# Patient Record
Sex: FEMALE | Race: WHITE | Employment: OTHER | URBAN - METROPOLITAN AREA
[De-identification: names, ages, dates, MRNs, and addresses within clinical notes are randomized per-mention and may not be internally consistent; named-entity substitution may affect disease eponyms.]

---

## 2017-04-04 ENCOUNTER — TELEPHONE (OUTPATIENT)
Dept: FAMILY MEDICINE CLINIC | Age: 72
End: 2017-04-04

## 2017-04-04 NOTE — TELEPHONE ENCOUNTER
Patient takes allegra 24 hour tabs daily. Patient wants to know if she can add another allergy medication.

## 2017-08-28 ENCOUNTER — OFFICE VISIT (OUTPATIENT)
Dept: FAMILY MEDICINE CLINIC | Age: 72
End: 2017-08-28

## 2017-08-28 VITALS
SYSTOLIC BLOOD PRESSURE: 180 MMHG | OXYGEN SATURATION: 96 % | BODY MASS INDEX: 22.84 KG/M2 | HEIGHT: 61 IN | DIASTOLIC BLOOD PRESSURE: 78 MMHG | RESPIRATION RATE: 12 BRPM | WEIGHT: 121 LBS | HEART RATE: 63 BPM | TEMPERATURE: 98 F

## 2017-08-28 DIAGNOSIS — R21 RASH: ICD-10-CM

## 2017-08-28 DIAGNOSIS — B02.9 HERPES ZOSTER WITHOUT COMPLICATION: Primary | ICD-10-CM

## 2017-08-28 RX ORDER — TRIAMCINOLONE ACETONIDE 1 MG/G
CREAM TOPICAL 2 TIMES DAILY
Qty: 15 G | Refills: 0 | Status: SHIPPED | OUTPATIENT
Start: 2017-08-28 | End: 2018-06-14

## 2017-08-28 NOTE — PROGRESS NOTES
HPI  Bubba Dodge is a 81 y.o. female who presents with a rash. Noticed that one week ago under the left armpit coming around to the left breast.  It is 2 distinct areas of rash about the size of a $0.50 piece. Describes it as a soreness rather than skin irritation, itching, burning. Has not tried anything for this. She is concerned about shingles    PMHx:  Past Medical History:   Diagnosis Date    GERD (gastroesophageal reflux disease)     High cholesterol     HTN (hypertension)     Hypothyroid        Meds:   Current Outpatient Prescriptions   Medication Sig Dispense Refill    triamcinolone acetonide (KENALOG) 0.1 % topical cream Apply  to affected area two (2) times a day. use thin layer 15 g 0    levothyroxine (SYNTHROID) 137 mcg tablet Take 137 mcg by mouth Daily (before breakfast). 90 Tab 3    lutein 20 mg tab Take  by mouth.  atorvastatin (LIPITOR) 40 mg tablet Take 1 Tab by mouth nightly. For cholesterol 90 Tab 3    atenolol (TENORMIN) 25 mg tablet Take 2 Tabs by mouth daily. 180 Tab 3    omeprazole (PRILOSEC) 20 mg capsule Take 1 Cap by mouth daily. 90 Cap 3    fluticasone (FLONASE) 50 mcg/actuation nasal spray 1 spray each nostril twice a day 1 Bottle 5    CALCIUM CARBONATE (CALCIUM 600 PO) Take  by mouth.  aspirin 81 mg chewable tablet Take 81 mg by mouth daily.  cholecalciferol, vitamin d3, 400 unit cap Take  by mouth.  multivitamin (ONE A DAY) tablet Take 1 Tab by mouth daily.  fexofenadine (ALLEGRA) 180 mg tablet Take  by mouth daily. Allergies: Allergies   Allergen Reactions    Pneumococcal 23-Roxanne Ps Vaccine Swelling       Smoker:  History   Smoking Status    Never Smoker   Smokeless Tobacco    Never Used       ETOH:   History   Alcohol Use No       FH: No family history on file. ROS:   As listed in HPI.  In addition:  Constitutional:   No headache, fever, fatigue, weight loss or weight gain      Cardiac:    No chest pain      Resp:   No cough or shortness of breath      Neuro   No loss of consciousness, dizziness, seizures      Physical Exam:  Blood pressure 180/78, pulse 63, temperature 98 °F (36.7 °C), resp. rate 12, height 5' 1\" (1.549 m), weight 121 lb (54.9 kg), last menstrual period 01/06/1996, SpO2 96 %. GEN: No apparent distress. Alert and oriented and responds to all questions appropriately. NEUROLOGIC:  No focal neurologic deficits. Strength and sensation grossly intact. Coordination and gait grossly intact. EXT: Well perfused. No edema. SKIN: Left axilla, left lateral breast examined, there are 2 distinct areas about the same size. About 1 inch in diameter where the skin is dry, raw with punctate erythema. No obvious blisters with roofs. Possible tiny ulcers but hard to tell       Assessment and Plan     Zoster? Distribution and soreness consistent with a mild zoster flare. Difficult to tell because symptoms are so mild. We will treat as though a light eczema for lack of anything more productive to do. At this point antiviral would not be effective against zoster and patient is having such mild symptoms that she does not feel the need for medication anyway. Steroid cream  Apply moisturizing lotion over top    Elevated BP  Blood pressure elevated, did not come down on recheck. Not typical for her recently  Monitor      ICD-10-CM ICD-9-CM    1. Herpes zoster without complication V67.6 209.3    2. Rash R21 782.1 triamcinolone acetonide (KENALOG) 0.1 % topical cream       AVS given.  Pt expressed understanding of instructions

## 2017-09-28 RX ORDER — OMEPRAZOLE 20 MG/1
20 CAPSULE, DELAYED RELEASE ORAL DAILY
Qty: 90 CAP | Refills: 3 | Status: SHIPPED | OUTPATIENT
Start: 2017-09-28 | End: 2018-08-15 | Stop reason: SDUPTHER

## 2017-09-28 NOTE — TELEPHONE ENCOUNTER
Patient calling in 90 day supply for med. She also needs to be set up for an allergist and would like to talk to a nurse about how to go about doing this.  She can be reached at [phone]

## 2017-10-27 ENCOUNTER — HOSPITAL ENCOUNTER (OUTPATIENT)
Dept: LAB | Age: 72
Discharge: HOME | End: 2017-10-27
Payer: MEDICARE

## 2017-10-27 ENCOUNTER — OFFICE VISIT (OUTPATIENT)
Dept: FAMILY MEDICINE CLINIC | Age: 72
End: 2017-10-27

## 2017-10-27 VITALS
RESPIRATION RATE: 12 BRPM | OXYGEN SATURATION: 98 % | WEIGHT: 120 LBS | HEIGHT: 61 IN | SYSTOLIC BLOOD PRESSURE: 132 MMHG | BODY MASS INDEX: 22.66 KG/M2 | DIASTOLIC BLOOD PRESSURE: 82 MMHG | TEMPERATURE: 97.8 F | HEART RATE: 62 BPM

## 2017-10-27 DIAGNOSIS — Z23 ENCOUNTER FOR IMMUNIZATION: ICD-10-CM

## 2017-10-27 DIAGNOSIS — I10 ESSENTIAL HYPERTENSION: ICD-10-CM

## 2017-10-27 DIAGNOSIS — Z00.00 MEDICARE ANNUAL WELLNESS VISIT, SUBSEQUENT: Primary | ICD-10-CM

## 2017-10-27 DIAGNOSIS — Z13.39 SCREENING FOR ALCOHOLISM: ICD-10-CM

## 2017-10-27 DIAGNOSIS — M81.0 POSTMENOPAUSAL BONE LOSS: ICD-10-CM

## 2017-10-27 DIAGNOSIS — E03.9 ACQUIRED HYPOTHYROIDISM: ICD-10-CM

## 2017-10-27 DIAGNOSIS — Z13.31 SCREENING FOR DEPRESSION: ICD-10-CM

## 2017-10-27 DIAGNOSIS — E78.5 HYPERLIPIDEMIA, UNSPECIFIED HYPERLIPIDEMIA TYPE: ICD-10-CM

## 2017-10-27 PROCEDURE — 80053 COMPREHEN METABOLIC PANEL: CPT

## 2017-10-27 PROCEDURE — 80061 LIPID PANEL: CPT

## 2017-10-27 PROCEDURE — 36415 COLL VENOUS BLD VENIPUNCTURE: CPT

## 2017-10-27 PROCEDURE — 84443 ASSAY THYROID STIM HORMONE: CPT

## 2017-10-27 PROCEDURE — 85025 COMPLETE CBC W/AUTO DIFF WBC: CPT

## 2017-10-27 NOTE — PROGRESS NOTES
Chief Complaint   Patient presents with   [facility] Annual Wellness Visit     Patient is here for a medicare wellness visit.

## 2017-10-27 NOTE — PROGRESS NOTES
This is a Subsequent Medicare Annual Wellness Exam (AWV) (Performed 12 months after IPPE or effective date of Medicare Part B enrollment)    I have reviewed the patient's medical history in detail and updated the computerized patient record. History     Past Medical History:   Diagnosis Date    GERD (gastroesophageal reflux disease)     High cholesterol     HTN (hypertension)     Hypothyroid       Past Surgical History:   Procedure Laterality Date    HX GYN      hysterectomy     Current Outpatient Prescriptions   Medication Sig Dispense Refill    varicella zoster vacine live (ZOSTAVAX) 19,400 unit/0.65 mL susr injection 1 Vial by SubCUTAneous route once for 1 dose. 0.65 mL 0    omeprazole (PRILOSEC) 20 mg capsule Take 1 Cap by mouth daily. 90 Cap 3    triamcinolone acetonide (KENALOG) 0.1 % topical cream Apply  to affected area two (2) times a day. use thin layer 15 g 0    levothyroxine (SYNTHROID) 137 mcg tablet Take 137 mcg by mouth Daily (before breakfast). 90 Tab 3    lutein 20 mg tab Take  by mouth.  atorvastatin (LIPITOR) 40 mg tablet Take 1 Tab by mouth nightly. For cholesterol 90 Tab 3    atenolol (TENORMIN) 25 mg tablet Take 2 Tabs by mouth daily. 180 Tab 3    fluticasone (FLONASE) 50 mcg/actuation nasal spray 1 spray each nostril twice a day 1 Bottle 5    CALCIUM CARBONATE (CALCIUM 600 PO) Take  by mouth.  aspirin 81 mg chewable tablet Take 81 mg by mouth daily.  cholecalciferol, vitamin d3, 400 unit cap Take  by mouth.  multivitamin (ONE A DAY) tablet Take 1 Tab by mouth daily.  fexofenadine (ALLEGRA) 180 mg tablet Take  by mouth daily. Allergies   Allergen Reactions    Pneumococcal 23-Roxanne Ps Vaccine Swelling     History reviewed. No pertinent family history.   Social History   Substance Use Topics    Smoking status: Never Smoker    Smokeless tobacco: Never Used    Alcohol use No     Patient Active Problem List   Diagnosis Code    Hyperlipidemia E78.5    Unspecified hypothyroidism E03.9    DJD (degenerative joint disease) of knee M17.10    Hypertension I10    Lower extremity edema R60.0    PVCs (premature ventricular contractions) I49.3       Depression Risk Factor Screening:     PHQ over the last two weeks 10/27/2017   Little interest or pleasure in doing things Not at all   Feeling down, depressed or hopeless Not at all   Total Score PHQ 2 0     Alcohol Risk Factor Screening: You do not drink alcohol or very rarely. Functional Ability and Level of Safety:   Hearing Loss  Hearing is good. Activities of Daily Living  The home contains: no safety equipment. Patient does total self care    Fall RiskFall Risk Assessment, last 12 mths 10/27/2017   Able to walk? Yes   Fall in past 12 months? No       Abuse Screen  Patient is not abused    Cognitive Screening   Evaluation of Cognitive Function:  Has your family/caregiver stated any concerns about your memory: no      Patient Care Team   Patient Care Team:  Gordo Luna MD as PCP - General (Internal Medicine)    Assessment/Plan   Education and counseling provided:  Are appropriate based on today's review and evaluation    Diagnoses and all orders for this visit:    1. Medicare annual wellness visit, subsequent    2. Postmenopausal bone loss  -     Dexa Bone Density Study Axial (LRW0171); Future    3. Screening for alcoholism  -     Annual  Alcohol Screen 15 min ()    4. Screening for depression  -     Depression Screen Annual    5. Encounter for immunization  -     Influenza Admin ()  -     Influenza virus vaccine (FLUZONE HIGH DOSE) PF ([id number])  -     varicella zoster vacine live (ZOSTAVAX) 19,400 unit/0.65 mL susr injection; 1 Vial by SubCUTAneous route once for 1 dose. Health Maintenance Due   Topic Date Due    DTaP/Tdap/Td series (1 - Tdap) 06/03/1968    ZOSTER VACCINE AGE 60>  04/03/2007    INFLUENZA AGE 9 TO ADULT  08/01/2017       HPI:  81 y.o.   Also presents for chronic condition follow up. Patient Active Problem List    Diagnosis    PVCs (premature ventricular contractions)    Hypertension    Lower extremity edema    Unspecified hypothyroidism    DJD (degenerative joint disease) of knee    Hyperlipidemia       PMH, PSH, SH, Meds, Allergies all documented above. ROS:  ROS negative except as per HPI. PE:  Visit Vitals    /78 (BP 1 Location: Left arm, BP Patient Position: Sitting)    Pulse 62    Temp 97.8 °F (36.6 °C) (Oral)    Resp 12    Ht 5' 1\" (1.549 m)    Wt 120 lb (54.4 kg)    LMP 01/06/1996    SpO2 98%    BMI 22.67 kg/m2     Gen: alert, oriented, no acute distress  Head: normocephalic, atraumatic  Ears: external auditory canals clear, TMs without erythema or effusion  Eyes: pupils equal round reactive to light, sclera clear, conjunctiva clear  Oral: moist mucus membranes, no oral lesions, no pharyngeal inflammation or exudate  Neck: symmetric normal sized thyroid, no carotid bruits, no jugular vein distention  Resp: no increase work of breathing, lungs clear to ausculation bilaterally, no wheezing, rales or rhonchi  CV: S1, S2 normal.  No murmurs, rubs, or gallops. Abd: soft, not tender, not distended. No hepatosplenomegaly. Normal bowel sounds. Neuro: cranial nerves intact, normal strength and movement in all extremities, reflexes and sensation intact and symmetric. Skin: no lesion or rash  Extremities: no cyanosis or edema    Assessment/Plan:      6. Acquired hypothyroidism  Asymptomatic, no changes pending labs  - TSH 3RD GENERATION    7. Essential hypertension  At goal.  Continue current medications. - CBC WITH AUTOMATED DIFF  - METABOLIC PANEL, COMPREHENSIVE  - LIPID PANEL    8. Hyperlipidemia, unspecified hyperlipidemia type  No changes in medications pending lab results. Health Maintenance reviewed - updated.     Orders Placed This Encounter    Depression Screen Annual    Dexa Bone Density Study Axial ([id number]) Standing Status:   Future     Standing Expiration Date:   2018     Order Specific Question:   Reason for Exam     Answer:   Screening    Influenza virus vaccine (FLUZONE HIGH DOSE) PF ([id number])    Annual  Alcohol Screen 15 min ()    Influenza Admin ([employer])    varicella zoster vacine live (ZOSTAVAX) 19,400 unit/0.65 mL susr injection     Si Vial by SubCUTAneous route once for 1 dose. Dispense:  0.65 mL     Refill:  0       There are no discontinued medications. Current Outpatient Prescriptions   Medication Sig Dispense Refill    varicella zoster vacine live (ZOSTAVAX) 19,400 unit/0.65 mL susr injection 1 Vial by SubCUTAneous route once for 1 dose. 0.65 mL 0    omeprazole (PRILOSEC) 20 mg capsule Take 1 Cap by mouth daily. 90 Cap 3    triamcinolone acetonide (KENALOG) 0.1 % topical cream Apply  to affected area two (2) times a day. use thin layer 15 g 0    levothyroxine (SYNTHROID) 137 mcg tablet Take 137 mcg by mouth Daily (before breakfast). 90 Tab 3    lutein 20 mg tab Take  by mouth.  atorvastatin (LIPITOR) 40 mg tablet Take 1 Tab by mouth nightly. For cholesterol 90 Tab 3    atenolol (TENORMIN) 25 mg tablet Take 2 Tabs by mouth daily. 180 Tab 3    fluticasone (FLONASE) 50 mcg/actuation nasal spray 1 spray each nostril twice a day 1 Bottle 5    CALCIUM CARBONATE (CALCIUM 600 PO) Take  by mouth.  aspirin 81 mg chewable tablet Take 81 mg by mouth daily.  cholecalciferol, vitamin d3, 400 unit cap Take  by mouth.  multivitamin (ONE A DAY) tablet Take 1 Tab by mouth daily.  fexofenadine (ALLEGRA) 180 mg tablet Take  by mouth daily. Recommended healthy diet low in carbohydrates, fats, sodium and cholesterol. Recommended regular cardiovascular exercise 3-6 times per week for 30-60 minutes daily. Verbal and written instructions (see AVS) provided. Patient expresses understanding of diagnosis and treatment plan.

## 2017-10-27 NOTE — PATIENT INSTRUCTIONS
Medicare Wellness Visit, Female    The best way to live healthy is to have a healthy lifestyle by eating a well-balanced diet, exercising regularly, limiting alcohol and stopping smoking. Regular physical exams and screening tests are another way to keep healthy. Preventive exams provided by your health care provider can find health problems before they become diseases or illnesses. Preventive services including immunizations, screening tests, monitoring and exams can help you take care of your own health. All people over age 72 should have a pneumovax  and and a prevnar shot to prevent pneumonia. These are once in a lifetime unless you and your provider decide differently. All people over 65 should have a yearly flu shot and a tetanus vaccine every 10 years. A bone mass density to screen for osteoporosis or thinning of the bones should be done every 2 years after 65. Screening for diabetes mellitus with a blood sugar test should be done every year. Glaucoma is a disease of the eye due to increased ocular pressure that can lead to blindness and it should be done every year by an eye professional.    Cardiovascular screening tests that check for elevated lipids (fatty part of blood) which can lead to heart disease and strokes should be done every 5 years. Colorectal screening that evaluates for blood or polyps in your colon should be done yearly as a stool test or every five years as a flexible sigmoidoscope or every 10 years as a colonoscopy up to age 76. Breast cancer screening with a mammogram is recommended biennially  for women age 54-69. Screening for cervical cancer with a pap smear and pelvic exam is recommended for women after age 72 years every 2 years up to age 79 or when the provider and patient decide to stop. If there is a history of cervical abnormalities or other increased risk for cancer then the test is recommended yearly.     Hepatitis C screening is also recommended for anyone born between 80 through [address]. A shingles vaccine is also recommended once in a lifetime after age 61. Your Medicare Wellness Exam is recommended annually. Here is a list of your current Health Maintenance items with a due date:  Health Maintenance Due   Topic Date Due    Shingles vaccine                       Vaccine Information Statement    Influenza (Flu) Vaccine (Inactivated or Recombinant): What you need to know    Many Vaccine Information Statements are available in [place]o and other languages. See www.immunize.org/vis  Hojas de Información Sobre Vacunas están disponibles en Español y en muchos otros idiomas. Visite www.immunize.org/vis    1. Why get vaccinated? Influenza (flu) is a contagious disease that spreads around the [place] every year, usually between October and May. Flu is caused by influenza viruses, and is spread mainly by coughing, sneezing, and close contact. Anyone can get flu. Flu strikes suddenly and can last several days. Symptoms vary by age, but can include:   fever/chills   sore throat   muscle aches   fatigue   cough   headache    runny or stuffy nose    Flu can also lead to pneumonia and blood infections, and cause diarrhea and seizures in children. If you have a medical condition, such as heart or lung disease, flu can make it worse. Flu is more dangerous for some people. Infants and young children, people 72years of age and older, pregnant women, and people with certain health conditions or a weakened immune system are at greatest risk. Each year thousands of people in the [facility] die from flu, and many more are hospitalized. Flu vaccine can:   keep you from getting flu,   make flu less severe if you do get it, and   keep you from spreading flu to your family and other people. 2. Inactivated and recombinant flu vaccines    A dose of flu vaccine is recommended every flu season.  Children 6 months through 8 years of age may need two doses during the same flu season. Everyone else needs only one dose each flu season. Some inactivated flu vaccines contain a very small amount of a mercury-based preservative called thimerosal. Studies have not shown thimerosal in vaccines to be harmful, but flu vaccines that do not contain thimerosal are available. There is no live flu virus in flu shots. They cannot cause the flu. There are many flu viruses, and they are always changing. Each year a new flu vaccine is made to protect against three or four viruses that are likely to cause disease in the upcoming flu season. But even when the vaccine doesnt exactly match these viruses, it may still provide some protection    Flu vaccine cannot prevent:   flu that is caused by a virus not covered by the vaccine, or   illnesses that look like flu but are not. It takes about 2 weeks for protection to develop after vaccination, and protection lasts through the flu season. 3. Some people should not get this vaccine    Tell the person who is giving you the vaccine:     If you have any severe, life-threatening allergies. If you ever had a life-threatening allergic reaction after a dose of flu vaccine, or have a severe allergy to any part of this vaccine, you may be advised not to get vaccinated. Most, but not all, types of flu vaccine contain a small amount of egg protein.  If you ever had Guillain-Barré Syndrome (also called GBS). Some people with a history of GBS should not get this vaccine. This should be discussed with your doctor.  If you are not feeling well. It is usually okay to get flu vaccine when you have a mild illness, but you might be asked to come back when you feel better. 4. Risks of a vaccine reaction    With any medicine, including vaccines, there is a chance of reactions. These are usually mild and go away on their own, but serious reactions are also possible.      Most people who get a flu shot do not have any problems with it. Minor problems following a flu shot include:    soreness, redness, or swelling where the shot was given     hoarseness   sore, red or itchy eyes   cough   fever   aches   headache   itching   fatigue  If these problems occur, they usually begin soon after the shot and last 1 or 2 days. More serious problems following a flu shot can include the following:     There may be a small increased risk of Guillain-Barré Syndrome (GBS) after inactivated flu vaccine. This risk has been estimated at 1 or 2 additional cases per million people vaccinated. This is much lower than the risk of severe complications from flu, which can be prevented by flu vaccine.  Young children who get the flu shot along with pneumococcal vaccine (PCV13) and/or DTaP vaccine at the same time might be slightly more likely to have a seizure caused by fever. Ask your doctor for more information. Tell your doctor if a child who is getting flu vaccine has ever had a seizure. Problems that could happen after any injected vaccine:      People sometimes faint after a medical procedure, including vaccination. Sitting or lying down for about 15 minutes can help prevent fainting, and injuries caused by a fall. Tell your doctor if you feel dizzy, or have vision changes or ringing in the ears.  Some people get severe pain in the shoulder and have difficulty moving the arm where a shot was given. This happens very rarely.  Any medication can cause a severe allergic reaction. Such reactions from a vaccine are very rare, estimated at about 1 in a million doses, and would happen within a few minutes to a few hours after the vaccination. As with any medicine, there is a very remote chance of a vaccine causing a serious injury or death. The safety of vaccines is always being monitored. For more information, visit: www.cdc.gov/vaccinesafety/    5.  What if there is a serious reaction? What should I look for?  Look for anything that concerns you, such as signs of a severe allergic reaction, very high fever, or unusual behavior. Signs of a severe allergic reaction can include hives, swelling of the face and throat, difficulty breathing, a fast heartbeat, dizziness, and weakness  usually within a few minutes to a few hours after the vaccination. What should I do?  If you think it is a severe allergic reaction or other emergency that cant wait, call 9-1-1 and get the person to the nearest hospital. Otherwise, call your doctor.  Reactions should be reported to the Vaccine Adverse Event Reporting System (VAERS). Your doctor should file this report, or you can do it yourself through  the VAERS web site at www.vaers. [facility].gov, or by calling 8-453.949.8758. VAERS does not give medical advice. 6. The National Vaccine Injury Compensation Program    The [facility] Vaccine Injury Compensation Program (VICP) is a federal program that was created to compensate people who may have been injured by certain vaccines. Persons who believe they may have been injured by a vaccine can learn about the program and about filing a claim by calling 5-264.256.2288 or visiting the [address] [place] [address] website at www.[facility].gov/vaccinecompensation. There is a time limit to file a claim for compensation. 7. How can I learn more?  Ask your healthcare provider. He or she can give you the vaccine package insert or suggest other sources of information.  Call your local or state health department.  Contact the Centers for Disease Control and Prevention (CDC):  - Call 2-893.833.7107 (1-800-CDC-INFO) or  - Visit CDCs website at www.cdc.gov/flu    Vaccine Information Statement   Inactivated Influenza Vaccine   8/7/2015  42  Verenice Ghee [id number]    Department of Health and Human Services  Centers for Disease Control and Prevention    Office Use Only

## 2017-10-27 NOTE — MR AVS SNAPSHOT
Visit Information Date & Time Provider Department Dept. Phone Encounter #  
 10/27/2017  7:45 AM [facility] [phone] [id number] Follow-up Instructions Return in about 6 months (around 4/27/2018), or if symptoms worsen or fail to improve. Routing History Upcoming Health Maintenance Date Due DTaP/Tdap/Td series (1 - Tdap) 6/3/1968 ZOSTER VACCINE AGE 60> 4/3/2007 INFLUENZA AGE 9 TO ADULT 8/1/2017 COLONOSCOPY 4/30/2018 GLAUCOMA SCREENING Q2Y 7/7/2018 MEDICARE YEARLY EXAM 10/28/2018 BREAST CANCER SCRN MAMMOGRAM 7/10/2019 Allergies as of 10/27/2017  Review Complete On: 10/27/2017 By: Ganesh Ann MD  
  
 Severity Noted Reaction Type Reactions Pneumococcal 23-chase Ps Vaccine  08/13/2014    Swelling Current Immunizations  Never Reviewed Name Date Influenza High Dose Vaccine PF  Incomplete, 10/7/2016, 10/13/2014 Not reviewed this visit You Were Diagnosed With   
  
 Codes Comments Medicare annual wellness visit, subsequent    -  Primary ICD-10-CM: Z00.00 ICD-9-CM: V70.0 Postmenopausal bone loss     ICD-10-CM: M81.0 ICD-9-CM: 733.01 Screening for alcoholism     ICD-10-CM: Z13.89 ICD-9-CM: V79.1 Screening for depression     ICD-10-CM: Z13.89 ICD-9-CM: V79.0 Encounter for immunization     ICD-10-CM: D74 ICD-9-CM: V03.89 Acquired hypothyroidism     ICD-10-CM: E03.9 ICD-9-CM: 244.9 Essential hypertension     ICD-10-CM: I10 
ICD-9-CM: 401.9 Hyperlipidemia, unspecified hyperlipidemia type     ICD-10-CM: E78.5 ICD-9-CM: 272.4 Vitals BP Pulse Temp Resp Height(growth percentile) Weight(growth percentile) 132/82 62 97.8 °F (36.6 °C) (Oral) 12 5' 1\" (1.549 m) 120 lb (54.4 kg) LMP SpO2 BMI OB Status Smoking Status 01/06/1996 98% 22.67 kg/m2 Hysterectomy Never Smoker Vitals History BMI and BSA Data Body Mass Index Body Surface Area 22.67 kg/m 2 1.53 m 2 Preferred Pharmacy Pharmacy Name [address] [facility] [phone] Your Updated Medication List  
  
   
This list is accurate as of: 10/27/17  8:22 AM.  Always use your most recent med list. ALLEGRA 180 mg tablet Generic drug:  fexofenadine Take  by mouth daily. aspirin 81 mg chewable tablet Take 81 mg by mouth daily. atenolol 25 mg tablet Commonly known as:  TENORMIN Take 2 Tabs by mouth daily. atorvastatin 40 mg tablet Commonly known as:  LIPITOR Take 1 Tab by mouth nightly. For cholesterol CALCIUM 600 PO Take  by mouth. cholecalciferol (vitamin d3) 400 unit Cap Take  by mouth. fluticasone 50 mcg/actuation nasal spray Commonly known as:  FLONASE  
1 spray each nostril twice a day  
  
 levothyroxine 137 mcg tablet Commonly known as:  SYNTHROID Take 137 mcg by mouth Daily (before breakfast). lutein 20 mg Tab Take  by mouth.  
  
 multivitamin tablet Commonly known as:  ONE A DAY Take 1 Tab by mouth daily. omeprazole 20 mg capsule Commonly known as:  PRILOSEC Take 1 Cap by mouth daily. triamcinolone acetonide 0.1 % topical cream  
Commonly known as:  KENALOG Apply  to affected area two (2) times a day. use thin layer  
  
 varicella zoster vacine live 19,400 unit/0.65 mL Susr injection Commonly known as:  ZOSTAVAX  
1 Vial by SubCUTAneous route once for 1 dose. Prescriptions Printed Refills  
 varicella zoster vacine live (ZOSTAVAX) 19,400 unit/0.65 mL susr injection 0 Si Vial by SubCUTAneous route once for 1 dose. Class: Print Route: SubCUTAneous We Performed the Following ADMIN INFLUENZA VIRUS VAC [ HCP] CBC WITH AUTOMATED DIFF [86811 CPT(R)] [address] [[id number] [facility]] INFLUENZA VIRUS VACCINE, HIGH DOSE SEASONAL, PRESERVATIVE FREE [83000 CPT(R)] LIPID PANEL [79006 CPT(R)] METABOLIC PANEL, COMPREHENSIVE [47856 CPT(R)] CO ANNUAL ALCOHOL SCREEN 15 MIN [id number] [facility]] TSH 3RD GENERATION [66874 CPT(R)] Follow-up Instructions Return in about 6 months (around 4/27/2018), or if symptoms worsen or fail to improve. To-Do List   
 10/30/2017 Imaging:  DEXA BONE DENSITY STUDY AXIAL Patient Instructions Medicare Wellness Visit, Female The best way to live healthy is to have a healthy lifestyle by eating a well-balanced diet, exercising regularly, limiting alcohol and stopping smoking. Regular physical exams and screening tests are another way to keep healthy. Preventive exams provided by your health care provider can find health problems before they become diseases or illnesses. Preventive services including immunizations, screening tests, monitoring and exams can help you take care of your own health. All people over age 72 should have a pneumovax  and and a prevnar shot to prevent pneumonia. These are once in a lifetime unless you and your provider decide differently. All people over 65 should have a yearly flu shot and a tetanus vaccine every 10 years. A bone mass density to screen for osteoporosis or thinning of the bones should be done every 2 years after 65. Screening for diabetes mellitus with a blood sugar test should be done every year. Glaucoma is a disease of the eye due to increased ocular pressure that can lead to blindness and it should be done every year by an eye professional. 
 
Cardiovascular screening tests that check for elevated lipids (fatty part of blood) which can lead to heart disease and strokes should be done every 5 years.  
 
Colorectal screening that evaluates for blood or polyps in your colon should be done yearly as a stool test or every five years as a flexible sigmoidoscope or every 10 years as a colonoscopy up to age 76. Breast cancer screening with a mammogram is recommended biennially  for women age 54-69. Screening for cervical cancer with a pap smear and pelvic exam is recommended for women after age 72 years every 2 years up to age 79 or when the provider and patient decide to stop. If there is a history of cervical abnormalities or other increased risk for cancer then the test is recommended yearly. Hepatitis C screening is also recommended for anyone born between 80 through [address]. A shingles vaccine is also recommended once in a lifetime after age 61. Your Medicare Wellness Exam is recommended annually. Here is a list of your current Health Maintenance items with a due date: 
Health Maintenance Due Topic Date Due  Shingles vaccine    Vaccine Information Statement Influenza (Flu) Vaccine (Inactivated or Recombinant): What you need to know Many Vaccine Information Statements are available in [place]ian and other languages. See www.immunize.org/vis Hojas de Información Sobre Vacunas están disponibles en Español y en muchos otros idiomas. Visite www.immunize.org/vis 1. Why get vaccinated? Influenza (flu) is a contagious disease that spreads around the [facility] every year, usually between October and May. Flu is caused by influenza viruses, and is spread mainly by coughing, sneezing, and close contact. Anyone can get flu. Flu strikes suddenly and can last several days. Symptoms vary by age, but can include: 
 fever/chills  sore throat  muscle aches  fatigue  cough  headache  runny or stuffy nose Flu can also lead to pneumonia and blood infections, and cause diarrhea and seizures in children. If you have a medical condition, such as heart or lung disease, flu can make it worse. Flu is more dangerous for some people.  Infants and young children, people 72years of age and older, pregnant women, and people with certain health conditions or a weakened immune system are at greatest risk. Each year thousands of people in the [facility] die from flu, and many more are hospitalized. Flu vaccine can: 
 keep you from getting flu, 
 make flu less severe if you do get it, and 
 keep you from spreading flu to your family and other people. 2. Inactivated and recombinant flu vaccines A dose of flu vaccine is recommended every flu season. Children 6 months through 6years of age may need two doses during the same flu season. Everyone else needs only one dose each flu season. Some inactivated flu vaccines contain a very small amount of a mercury-based preservative called thimerosal. Studies have not shown thimerosal in vaccines to be harmful, but flu vaccines that do not contain thimerosal are available. There is no live flu virus in flu shots. They cannot cause the flu. There are many flu viruses, and they are always changing. Each year a new flu vaccine is made to protect against three or four viruses that are likely to cause disease in the upcoming flu season. But even when the vaccine doesnt exactly match these viruses, it may still provide some protection Flu vaccine cannot prevent: 
 flu that is caused by a virus not covered by the vaccine, or 
 illnesses that look like flu but are not. It takes about 2 weeks for protection to develop after vaccination, and protection lasts through the flu season. 3. Some people should not get this vaccine Tell the person who is giving you the vaccine:  If you have any severe, life-threatening allergies. If you ever had a life-threatening allergic reaction after a dose of flu vaccine, or have a severe allergy to any part of this vaccine, you may be advised not to get vaccinated. Most, but not all, types of flu vaccine contain a small amount of egg protein.  If you ever had Guillain-Barré Syndrome (also called GBS). Some people with a history of GBS should not get this vaccine. This should be discussed with your doctor.  If you are not feeling well. It is usually okay to get flu vaccine when you have a mild illness, but you might be asked to come back when you feel better. 4. Risks of a vaccine reaction With any medicine, including vaccines, there is a chance of reactions. These are usually mild and go away on their own, but serious reactions are also possible. Most people who get a flu shot do not have any problems with it. Minor problems following a flu shot include:  
 soreness, redness, or swelling where the shot was given  hoarseness  sore, red or itchy eyes  cough  fever  aches  headache  itching  fatigue If these problems occur, they usually begin soon after the shot and last 1 or 2 days. More serious problems following a flu shot can include the following:  There may be a small increased risk of Guillain-Barré Syndrome (GBS) after inactivated flu vaccine. This risk has been estimated at 1 or 2 additional cases per million people vaccinated. This is much lower than the risk of severe complications from flu, which can be prevented by flu vaccine.  Young children who get the flu shot along with pneumococcal vaccine (PCV13) and/or DTaP vaccine at the same time might be slightly more likely to have a seizure caused by fever. Ask your doctor for more information. Tell your doctor if a child who is getting flu vaccine has ever had a seizure. Problems that could happen after any injected vaccine:  People sometimes faint after a medical procedure, including vaccination. Sitting or lying down for about 15 minutes can help prevent fainting, and injuries caused by a fall. Tell your doctor if you feel dizzy, or have vision changes or ringing in the ears.  Some people get severe pain in the shoulder and have difficulty moving the arm where a shot was given. This happens very rarely.  Any medication can cause a severe allergic reaction. Such reactions from a vaccine are very rare, estimated at about 1 in a million doses, and would happen within a few minutes to a few hours after the vaccination. As with any medicine, there is a very remote chance of a vaccine causing a serious injury or death. The safety of vaccines is always being monitored. For more information, visit: www.cdc.gov/vaccinesafety/ 
 
5. What if there is a serious reaction? What should I look for?  Look for anything that concerns you, such as signs of a severe allergic reaction, very high fever, or unusual behavior. Signs of a severe allergic reaction can include hives, swelling of the face and throat, difficulty breathing, a fast heartbeat, dizziness, and weakness  usually within a few minutes to a few hours after the vaccination. What should I do?  If you think it is a severe allergic reaction or other emergency that cant wait, call 9-1-1 and get the person to the nearest hospital. Otherwise, call your doctor.  Reactions should be reported to the Vaccine Adverse Event Reporting System (VAERS). Your doctor should file this report, or you can do it yourself through  the VAERS web site at www.vaers. [facility].gov, or by calling 5-873.600.2269. VAERS does not give medical advice. 6. The National Vaccine Injury Compensation Program 
 
The [facility] A Vaccine Injury Compensation Program (VICP) is a federal program that was created to compensate people who may have been injured by certain vaccines. Persons who believe they may have been injured by a vaccine can learn about the program and about filing a claim by calling 2-241.799.2005 or visiting the [employer] website at www.[facility].gov/vaccinecompensation. There is a time limit to file a claim for compensation. 7. How can I learn more?  Ask your healthcare provider. He or she can give you the vaccine package insert or suggest other sources of information.  Call your local or state health department.  Contact the Centers for Disease Control and Prevention (CDC): 
- Call 0-813.799.6561 (1-800-CDC-INFO) or 
- Visit CDCs website at www.cdc.gov/flu Vaccine Information Statement Inactivated Influenza Vaccine 8/7/2015 
[address] [facility] and [employer] Centers for Disease Control and Prevention Office Use Only Introducing [facility]! [employer] introduces [employer] patient portal. Now you can access parts of your medical record, email your doctor's office, and request medication refills online. 1. In your internet browser, go to [email or web address] 2. Click on the First Time User? Click Here link in the Sign In box. You will see the New Member Sign Up page. 3. Enter your [employer] Access Code exactly as it appears below. You will not need to use this code after youve completed the sign-up process. If you do not sign up before the expiration date, you must request a new code. · [employer] Access Code: [email or web address] Expires: 11/26/2017  1:16 PM 
 
4. Enter the last four digits of your Social Security Number (xxxx) and Date of Birth (mm/dd/yyyy) as indicated and click Submit. You will be taken to the next sign-up page. 5. Create a [employer] ID. This will be your [employer] login ID and cannot be changed, so think of one that is secure and easy to remember. 6. Create a [employer] password. You can change your password at any time. 7. Enter your Password Reset Question and Answer. This can be used at a later time if you forget your password. 8. Enter your e-mail address. You will receive e-mail notification when new information is available in [address] 9. Click Sign Up. You can now view and download portions of your medical record. 10. Click the Download Summary menu link to download a portable copy of your medical information. If you have questions, please visit the Frequently Asked Questions section of the [employer] website. Remember, [employer] is NOT to be used for urgent needs. For medical emergencies, dial 911. Now available from your iPhone and Android! Please provide this summary of care documentation to your next provider. Your primary care clinician is listed as Dami Wilks. If you have any questions after today's visit, please call [phone].

## 2017-10-28 LAB
ALBUMIN SERPL-MCNC: 4.5 G/DL (ref 3.5–4.8)
ALBUMIN/GLOB SERPL: 1.6 {RATIO} (ref 1.2–2.2)
ALP SERPL-CCNC: 73 IU/L (ref 39–117)
ALT SERPL-CCNC: 19 IU/L (ref 0–32)
AST SERPL-CCNC: 23 IU/L (ref 0–40)
BASOPHILS # BLD AUTO: 0 X10E3/UL (ref 0–0.2)
BASOPHILS NFR BLD AUTO: 1 %
BILIRUB SERPL-MCNC: 0.6 MG/DL (ref 0–1.2)
BUN SERPL-MCNC: 11 MG/DL (ref 8–27)
BUN/CREAT SERPL: 16 (ref 12–28)
CALCIUM SERPL-MCNC: 9.4 MG/DL (ref 8.7–10.3)
CHLORIDE SERPL-SCNC: 103 MMOL/L (ref 96–106)
CHOLEST SERPL-MCNC: 155 MG/DL (ref 100–199)
CO2 SERPL-SCNC: 26 MMOL/L (ref 18–29)
CREAT SERPL-MCNC: 0.67 MG/DL (ref 0.57–1)
EOSINOPHIL # BLD AUTO: 0.1 X10E3/UL (ref 0–0.4)
EOSINOPHIL NFR BLD AUTO: 3 %
ERYTHROCYTE [DISTWIDTH] IN BLOOD BY AUTOMATED COUNT: 13.4 % (ref 12.3–15.4)
GFR SERPLBLD CREATININE-BSD FMLA CKD-EPI: 103 ML/MIN/1.73
GFR SERPLBLD CREATININE-BSD FMLA CKD-EPI: 89 ML/MIN/1.73
GLOBULIN SER CALC-MCNC: 2.8 G/DL (ref 1.5–4.5)
GLUCOSE SERPL-MCNC: 105 MG/DL (ref 65–99)
HCT VFR BLD AUTO: 39.1 % (ref 34–46.6)
HDLC SERPL-MCNC: 54 MG/DL
HGB BLD-MCNC: 13.5 G/DL (ref 11.1–15.9)
IMM GRANULOCYTES # BLD: 0 X10E3/UL (ref 0–0.1)
IMM GRANULOCYTES NFR BLD: 0 %
INTERPRETATION, 910389: NORMAL
LDLC SERPL CALC-MCNC: 83 MG/DL (ref 0–99)
LYMPHOCYTES # BLD AUTO: 1.1 X10E3/UL (ref 0.7–3.1)
LYMPHOCYTES NFR BLD AUTO: 27 %
MCH RBC QN AUTO: 29.5 PG (ref 26.6–33)
MCHC RBC AUTO-ENTMCNC: 34.5 G/DL (ref 31.5–35.7)
MCV RBC AUTO: 86 FL (ref 79–97)
MONOCYTES # BLD AUTO: 0.4 X10E3/UL (ref 0.1–0.9)
MONOCYTES NFR BLD AUTO: 8 %
NEUTROPHILS # BLD AUTO: 2.6 X10E3/UL (ref 1.4–7)
NEUTROPHILS NFR BLD AUTO: 61 %
PLATELET # BLD AUTO: 217 X10E3/UL (ref 150–379)
POTASSIUM SERPL-SCNC: 4.3 MMOL/L (ref 3.5–5.2)
PROT SERPL-MCNC: 7.3 G/DL (ref 6–8.5)
RBC # BLD AUTO: 4.57 X10E6/UL (ref 3.77–5.28)
SODIUM SERPL-SCNC: 144 MMOL/L (ref 134–144)
TRIGL SERPL-MCNC: 88 MG/DL (ref 0–149)
TSH SERPL DL<=0.005 MIU/L-ACNC: 0.5 UIU/ML (ref 0.45–4.5)
VLDLC SERPL CALC-MCNC: 18 MG/DL (ref 5–40)
WBC # BLD AUTO: 4.3 X10E3/UL (ref 3.4–10.8)

## 2017-11-08 DIAGNOSIS — E03.9 ACQUIRED HYPOTHYROIDISM: ICD-10-CM

## 2017-11-08 RX ORDER — LEVOTHYROXINE SODIUM 137 UG/1
137 TABLET ORAL
Qty: 90 TAB | Refills: 3 | Status: SHIPPED | OUTPATIENT
Start: 2017-11-08 | End: 2018-11-12 | Stop reason: SDUPTHER

## 2017-11-08 RX ORDER — ATENOLOL 25 MG/1
50 TABLET ORAL DAILY
Qty: 180 TAB | Refills: 3 | Status: SHIPPED | OUTPATIENT
Start: 2017-11-08 | End: 2018-11-12 | Stop reason: SDUPTHER

## 2017-11-08 RX ORDER — ATORVASTATIN CALCIUM 40 MG/1
40 TABLET, FILM COATED ORAL
Qty: 90 TAB | Refills: 3 | Status: SHIPPED | OUTPATIENT
Start: 2017-11-08 | End: 2018-11-12 | Stop reason: SDUPTHER

## 2017-12-04 ENCOUNTER — HOSPITAL ENCOUNTER (OUTPATIENT)
Dept: MAMMOGRAPHY | Age: 72
Discharge: HOME | End: 2017-12-04
Attending: INTERNAL MEDICINE
Payer: MEDICARE

## 2017-12-04 DIAGNOSIS — M81.0 POSTMENOPAUSAL BONE LOSS: ICD-10-CM

## 2017-12-04 PROCEDURE — 77080 DXA BONE DENSITY AXIAL: CPT

## 2017-12-06 PROBLEM — M81.0 AGE-RELATED OSTEOPOROSIS WITHOUT CURRENT PATHOLOGICAL FRACTURE: Status: ACTIVE | Noted: 2017-12-06

## 2017-12-06 NOTE — PROGRESS NOTES
DEXA shows osteoporosis. Need to start medication to prevent fractures. Estimated risk of major fracture in the next 10 years is 15% - a major fracture would be dangerous. I suggest starting fosamax weekly. Please check to see if she's ever taken anything for her bones before.

## 2017-12-07 ENCOUNTER — TELEPHONE (OUTPATIENT)
Dept: FAMILY MEDICINE CLINIC | Age: 72
End: 2017-12-07

## 2017-12-07 RX ORDER — ALENDRONATE SODIUM 70 MG/1
70 TABLET ORAL
Qty: 12 TAB | Refills: 3 | Status: SHIPPED | OUTPATIENT
Start: 2017-12-07 | End: 2018-06-14

## 2017-12-07 NOTE — TELEPHONE ENCOUNTER
----- Message from Krystian Reyes MD sent at 12/6/2017  7:28 AM EST -----  DEXA shows osteoporosis. Need to start medication to prevent fractures. Estimated risk of major fracture in the next 10 years is 15% - a major fracture would be dangerous. I suggest starting fosamax weekly. Please check to see if she's ever taken anything for her bones before.

## 2017-12-07 NOTE — TELEPHONE ENCOUNTER
Pt notified of DEXA results and verbalized understanding. Pt is agreeable to started weekly Fosamax. Script sent to [facility] per Dr. Katy Yang.

## 2018-06-14 ENCOUNTER — OFFICE VISIT (OUTPATIENT)
Dept: FAMILY MEDICINE CLINIC | Age: 73
End: 2018-06-14

## 2018-06-14 VITALS
RESPIRATION RATE: 16 BRPM | HEIGHT: 61 IN | DIASTOLIC BLOOD PRESSURE: 79 MMHG | WEIGHT: 119.2 LBS | TEMPERATURE: 98 F | HEART RATE: 64 BPM | OXYGEN SATURATION: 96 % | BODY MASS INDEX: 22.51 KG/M2 | SYSTOLIC BLOOD PRESSURE: 136 MMHG

## 2018-06-14 DIAGNOSIS — M25.562 LEFT KNEE PAIN, UNSPECIFIED CHRONICITY: Primary | ICD-10-CM

## 2018-06-14 RX ORDER — MELOXICAM 15 MG/1
15 TABLET ORAL DAILY
Qty: 30 TAB | Refills: 0 | Status: SHIPPED | OUTPATIENT
Start: 2018-06-14 | End: 2018-07-16 | Stop reason: SDUPTHER

## 2018-06-14 RX ORDER — OLOPATADINE HYDROCHLORIDE 665 UG/1
2 SPRAY NASAL AS NEEDED
COMMUNITY
Start: 2018-05-01 | End: 2021-10-13

## 2018-06-14 NOTE — MR AVS SNAPSHOT
[facility] 
 
 
 [address] [place] [address] [address] [facility] 
[phone] Patient: Adam Abdi MRN: [id number] Visit Information Date & Time Provider Department Dept. Phone Encounter #  
 6/14/2018  1:30 PM Donovan Simmons [facility] [place] [phone] [id number] Upcoming Health Maintenance Date Due DTaP/Tdap/Td series (1 - Tdap) 6/3/1968 ZOSTER VACCINE AGE 60> 4/3/2007 COLONOSCOPY 4/30/2018 GLAUCOMA SCREENING Q2Y 7/7/2018 Influenza Age 5 to Adult 8/1/2018 BREAST CANCER SCRN MAMMOGRAM 7/10/2019 Allergies as of 6/14/2018  Review Complete On: 6/14/2018 By: Vinita Wilhelm MD  
  
 Severity Noted Reaction Type Reactions Pneumococcal 23-chase Ps Vaccine  08/13/2014    Swelling Current Immunizations  Never Reviewed Name Date Influenza High Dose Vaccine PF 10/27/2017, 10/7/2016, 10/13/2014 Not reviewed this visit You Were Diagnosed With   
  
 Codes Comments Left knee pain, unspecified chronicity    -  Primary ICD-10-CM: E87.312 ICD-9-CM: 719.46 Vitals BP Pulse Temp Resp Height(growth percentile) Weight(growth percentile) 136/79 (BP 1 Location: Left arm, BP Patient Position: Sitting) 64 98 °F (36.7 °C) (Oral) 16 5' 1\" (1.549 m) 119 lb 3.2 oz (54.1 kg) LMP SpO2 BMI OB Status Smoking Status 01/06/1996 96% 22.52 kg/m2 Hysterectomy Never Smoker Vitals History BMI and BSA Data Body Mass Index Body Surface Area  
 22.52 kg/m 2 1.53 m 2 Preferred Pharmacy Pharmacy Name Phone [facility] [address] [place] [address] Drive [phone] Your Updated Medication List  
  
   
This list is accurate as of 6/14/18  1:44 PM.  Always use your most recent med list. ALLEGRA 180 mg tablet Generic drug:  fexofenadine Take  by mouth daily. aspirin 81 mg chewable tablet Take 81 mg by mouth daily. atenolol 25 mg tablet Commonly known as:  TENORMIN Take 2 Tabs by mouth daily. atorvastatin 40 mg tablet Commonly known as:  LIPITOR Take 1 Tab by mouth nightly. For cholesterol CALCIUM 600 PO Take  by mouth. cholecalciferol (vitamin d3) 400 unit Cap Take  by mouth. fluticasone 50 mcg/actuation nasal spray Commonly known as:  FLONASE  
1 spray each nostril twice a day  
  
 levothyroxine 137 mcg tablet Commonly known as:  SYNTHROID Take 137 mcg by mouth Daily (before breakfast). lutein 20 mg Tab Take  by mouth.  
  
 meloxicam 15 mg tablet Commonly known as:  MOBIC Take 1 Tab by mouth daily. multivitamin tablet Commonly known as:  ONE A DAY Take 1 Tab by mouth daily. olopatadine 0.6 % Spry Commonly known as:  PATANASE  
  
 omeprazole 20 mg capsule Commonly known as:  PRILOSEC Take 1 Cap by mouth daily. Prescriptions Sent to Pharmacy Refills  
 meloxicam (MOBIC) 15 mg tablet 0 Sig: Take 1 Tab by mouth daily. Class: Normal  
 Pharmacy: [address] Ph #: [phone] Route: Oral  
  
We Performed the Following REFERRAL TO ORTHOPEDICS [PAQ014 Custom] Referral Information Referral ID Referred By Referred To  
  
 [id number] [facility] [address] [address] [facility], [address], [address] Visits Status Start Date End Date 1 New Request 6/14/18 6/14/19 If your referral has a status of pending review or denied, additional information will be sent to support the outcome of this decision. Patient Instructions Knee Pain or Injury: Care Instructions Your Care Instructions Injuries are a common cause of knee problems. Sudden (acute) injuries may be caused by a direct blow to the knee. They can also be caused by abnormal twisting, bending, or falling on the knee.  Pain, bruising, or swelling may be severe, and may start within minutes of the injury. Overuse is another cause of knee pain. Other causes are climbing stairs, kneeling, and other activities that use the knee. Everyday wear and tear, especially as you get older, also can cause knee pain. Rest, along with home treatment, often relieves pain and allows your knee to heal. If you have a serious knee injury, you may need tests and treatment. Follow-up care is a key part of your treatment and safety. Be sure to make and go to all appointments, and call your doctor if you are having problems. It's also a good idea to know your test results and keep a list of the medicines you take. How can you care for yourself at home? · Be safe with medicines. Read and follow all instructions on the label. ¨ If the doctor gave you a prescription medicine for pain, take it as prescribed. ¨ If you are not taking a prescription pain medicine, ask your doctor if you can take an over-the-counter medicine. · Rest and protect your knee. Take a break from any activity that may cause pain. · Put ice or a cold pack on your knee for 10 to 20 minutes at a time. Put a thin cloth between the ice and your skin. · Prop up a sore knee on a pillow when you ice it or anytime you sit or lie down for the next 3 days. Try to keep it above the level of your heart. This will help reduce swelling. · If your knee is not swollen, you can put moist heat, a heating pad, or a warm cloth on your knee. · If your doctor recommends an elastic bandage, sleeve, or other type of support for your knee, wear it as directed. · Follow your doctor's instructions about how much weight you can put on your leg. Use a cane, crutches, or a walker as instructed. · Follow your doctor's instructions about activity during your healing process. If you can do mild exercise, slowly increase your activity. · Reach and stay at a healthy weight.  Extra weight can strain the joints, especially the knees and hips, and make the pain worse. Losing even a few pounds may help. When should you call for help? Call 911 anytime you think you may need emergency care. For example, call if: 
? · You have symptoms of a blood clot in your lung (called a pulmonary embolism). These may include: 
¨ Sudden chest pain. ¨ Trouble breathing. ¨ Coughing up blood. ?Call your doctor now or seek immediate medical care if: 
? · You have severe or increasing pain. ? · Your leg or foot turns cold or changes color. ? · You cannot stand or put weight on your knee. ? · Your knee looks twisted or bent out of shape. ? · You cannot move your knee. ? · You have signs of infection, such as: 
¨ Increased pain, swelling, warmth, or redness. ¨ Red streaks leading from the knee. ¨ Pus draining from a place on your knee. ¨ A fever. ? · You have signs of a blood clot in your leg (called a deep vein thrombosis), such as: 
¨ Pain in your calf, back of the knee, thigh, or groin. ¨ Redness and swelling in your leg or groin. ? Watch closely for changes in your health, and be sure to contact your doctor if: 
? · You have tingling, weakness, or numbness in your knee. ? · You have any new symptoms, such as swelling. ? · You have bruises from a knee injury that last longer than 2 weeks. ? · You do not get better as expected. Where can you learn more? Go to http://scooby-maximiliano.info/. Enter K195 in the search box to learn more about \"Knee Pain or Injury: Care Instructions. \" Current as of: March 20, 2017 Content Version: 11.4 © 4717-4276 [employer]. Care instructions adapted under license by [employer] (which disclaims liability or warranty for this information). If you have questions about a medical condition or this instruction, always ask your healthcare professional. [address] any warranty or liability for your use of this information. Introducing [facility]! [facility] introduces Create patient portal. Now you can access parts of your medical record, email your doctor's office, and request medication refills online. 1. In your internet browser, go to [email or web address] 2. Click on the First Time User? Click Here link in the Sign In box. You will see the New Member Sign Up page. 3. Enter your Create Access Code exactly as it appears below. You will not need to use this code after youve completed the sign-up process. If you do not sign up before the expiration date, you must request a new code. · Create Access Code: [email or web address] Expires: 9/12/2018  1:22 PM 
 
4. Enter the last four digits of your Social Security Number (xxxx) and Date of Birth (mm/dd/yyyy) as indicated and click Submit. You will be taken to the next sign-up page. 5. Create a Create ID. This will be your Create login ID and cannot be changed, so think of one that is secure and easy to remember. 6. Create a Create password. You can change your password at any time. 7. Enter your Password Reset Question and Answer. This can be used at a later time if you forget your password. 8. Enter your e-mail address. You will receive e-mail notification when new information is available in [address] 9. Click Sign Up. You can now view and download portions of your medical record. 10. Click the Download Summary menu link to download a portable copy of your medical information. If you have questions, please visit the Frequently Asked Questions section of the Create website. Remember, Create is NOT to be used for urgent needs. For medical emergencies, dial 911. Now available from your iPhone and Android! Please provide this summary of care documentation to your next provider. Your primary care clinician is listed as Truman Jimenes. If you have any questions after today's visit, please call [phone].

## 2018-06-14 NOTE — PROGRESS NOTES
Subjective:   Flores Ott is a 72 y.o. female who complains of bilateral knee swelling, lately some instability in the left knee. Has a fullness in the inner knee chronically. No recalled trauma. Left knee has just gotten worse lately. Past Medical History:   Diagnosis Date    GERD (gastroesophageal reflux disease)     High cholesterol     HTN (hypertension)     Hypothyroid     Shingles outbreak      Social History   Substance Use Topics    Smoking status: Never Smoker    Smokeless tobacco: Never Used    Alcohol use No     Outpatient Prescriptions Marked as Taking for the 6/14/18 encounter (Office Visit) with Ancelmo Roberts MD   Medication Sig Dispense Refill    olopatadine (PATANASE) 0.6 % spry       meloxicam (MOBIC) 15 mg tablet Take 1 Tab by mouth daily. 30 Tab 0    atenolol (TENORMIN) 25 mg tablet Take 2 Tabs by mouth daily. 180 Tab 3    levothyroxine (SYNTHROID) 137 mcg tablet Take 137 mcg by mouth Daily (before breakfast). 90 Tab 3    atorvastatin (LIPITOR) 40 mg tablet Take 1 Tab by mouth nightly. For cholesterol 90 Tab 3    omeprazole (PRILOSEC) 20 mg capsule Take 1 Cap by mouth daily. 90 Cap 3    lutein 20 mg tab Take  by mouth.  fluticasone (FLONASE) 50 mcg/actuation nasal spray 1 spray each nostril twice a day 1 Bottle 5    CALCIUM CARBONATE (CALCIUM 600 PO) Take  by mouth.  aspirin 81 mg chewable tablet Take 81 mg by mouth daily.  cholecalciferol, vitamin d3, 400 unit cap Take  by mouth.  multivitamin (ONE A DAY) tablet Take 1 Tab by mouth daily.  fexofenadine (ALLEGRA) 180 mg tablet Take  by mouth daily. Allergies   Allergen Reactions    Pneumococcal 23-Roxanne Ps Vaccine Swelling        Review of Systems  A comprehensive review of systems was negative except for that written in the HPI.     Objective:     Visit Vitals    /79 (BP 1 Location: Left arm, BP Patient Position: Sitting)    Pulse 64    Temp 98 °F (36.7 °C) (Oral)    Resp 16    Ht 5' 1\" (1.549 m)    Wt 119 lb 3.2 oz (54.1 kg)    LMP 01/06/1996    SpO2 96%    BMI 22.52 kg/m2     General:   alert, cooperative   Eyes: conjunctivae/scleras clear. PERRL, EOM's intact                   Heart: S1 and S2 normal,no murmurs noted    Lungs: Coarse to auscultation bilaterally, no increased work of breathing       Extremities:  Both knees have crepitus. Both knees have some medial swelling. Left knee is slightly warm to the touch. No erythema. No tense effusion. Right knee has full range of motion and normal stability examination. Left knee range of motion is slightly decreased. Stability exam is normal.          No results found for this visit on 06/14/18. Assessment/Plan:     1. Left knee pain, unspecified chronicity      Likely arthritic. Start meloxicam.  Follow-up with Dr. Rahul Beckwith for further orthopedic intervention. Orders Placed This Encounter    REFERRAL TO ORTHOPEDICS     Referral Priority:   Routine     Referral Type:   Consultation     Referral Reason:   Specialty Services Required     Referral Location:   [place] [address] [facility]     Referred to Provider:   Carmita Ruggiero MD    meloxicam [facility]) 15 mg tablet     Sig: Take 1 Tab by mouth daily. Dispense:  30 Tab     Refill:  0         Verbal and written instructions (see AVS) provided. Patient expresses understanding of diagnosis and treatment plan.

## 2018-06-14 NOTE — PROGRESS NOTES
Chief Complaint   Patient presents with    Knee Swelling     bilateral knee swelling, for a long time         1. Have you been to the ER, urgent care clinic since your last visit? Hospitalized since your last visit? no    2. Have you seen or consulted any other health care providers outside of the [facility] [place] since your last visit? Include any pap smears or colon screening.   no

## 2018-06-14 NOTE — PATIENT INSTRUCTIONS
Knee Pain or Injury: Care Instructions  Your Care Instructions    Injuries are a common cause of knee problems. Sudden (acute) injuries may be caused by a direct blow to the knee. They can also be caused by abnormal twisting, bending, or falling on the knee. Pain, bruising, or swelling may be severe, and may start within minutes of the injury. Overuse is another cause of knee pain. Other causes are climbing stairs, kneeling, and other activities that use the knee. Everyday wear and tear, especially as you get older, also can cause knee pain. Rest, along with home treatment, often relieves pain and allows your knee to heal. If you have a serious knee injury, you may need tests and treatment. Follow-up care is a key part of your treatment and safety. Be sure to make and go to all appointments, and call your doctor if you are having problems. It's also a good idea to know your test results and keep a list of the medicines you take. How can you care for yourself at home? · Be safe with medicines. Read and follow all instructions on the label. ¨ If the doctor gave you a prescription medicine for pain, take it as prescribed. ¨ If you are not taking a prescription pain medicine, ask your doctor if you can take an over-the-counter medicine. · Rest and protect your knee. Take a break from any activity that may cause pain. · Put ice or a cold pack on your knee for 10 to 20 minutes at a time. Put a thin cloth between the ice and your skin. · Prop up a sore knee on a pillow when you ice it or anytime you sit or lie down for the next 3 days. Try to keep it above the level of your heart. This will help reduce swelling. · If your knee is not swollen, you can put moist heat, a heating pad, or a warm cloth on your knee. · If your doctor recommends an elastic bandage, sleeve, or other type of support for your knee, wear it as directed.   · Follow your doctor's instructions about how much weight you can put on your leg. Use a cane, crutches, or a walker as instructed. · Follow your doctor's instructions about activity during your healing process. If you can do mild exercise, slowly increase your activity. · Reach and stay at a healthy weight. Extra weight can strain the joints, especially the knees and hips, and make the pain worse. Losing even a few pounds may help. When should you call for help? Call 911 anytime you think you may need emergency care. For example, call if:  ? · You have symptoms of a blood clot in your lung (called a pulmonary embolism). These may include:  ¨ Sudden chest pain. ¨ Trouble breathing. ¨ Coughing up blood. ?Call your doctor now or seek immediate medical care if:  ? · You have severe or increasing pain. ? · Your leg or foot turns cold or changes color. ? · You cannot stand or put weight on your knee. ? · Your knee looks twisted or bent out of shape. ? · You cannot move your knee. ? · You have signs of infection, such as:  ¨ Increased pain, swelling, warmth, or redness. ¨ Red streaks leading from the knee. ¨ Pus draining from a place on your knee. ¨ A fever. ? · You have signs of a blood clot in your leg (called a deep vein thrombosis), such as:  ¨ Pain in your calf, back of the knee, thigh, or groin. ¨ Redness and swelling in your leg or groin. ? Watch closely for changes in your health, and be sure to contact your doctor if:  ? · You have tingling, weakness, or numbness in your knee. ? · You have any new symptoms, such as swelling. ? · You have bruises from a knee injury that last longer than 2 weeks. ? · You do not get better as expected. Where can you learn more? Go to http://scooby-maximiliano.info/. Enter K195 in the search box to learn more about \"Knee Pain or Injury: Care Instructions. \"  Current as of: March 20, 2017  Content Version: 11.4  © 0850-8587 [email or web address].  Care instructions adapted under license by [employer] (which disclaims liability or warranty for this information). If you have questions about a medical condition or this instruction, always ask your healthcare professional. [address] any warranty or liability for your use of this information.

## 2018-07-16 NOTE — TELEPHONE ENCOUNTER
is requesting a refill on med    Requested Prescriptions     Pending Prescriptions Disp Refills    meloxicam (MOBIC) 15 mg tablet 30 Tab 0     Sig: Take 1 Tab by mouth daily.

## 2018-07-17 RX ORDER — MELOXICAM 15 MG/1
15 TABLET ORAL DAILY
Qty: 30 TAB | Refills: 0 | Status: SHIPPED | OUTPATIENT
Start: 2018-07-17 | End: 2018-08-06 | Stop reason: SDUPTHER

## 2018-08-06 RX ORDER — MELOXICAM 15 MG/1
15 TABLET ORAL DAILY
Qty: 30 TAB | Refills: 0 | Status: SHIPPED | OUTPATIENT
Start: 2018-08-06 | End: 2018-09-06 | Stop reason: SDUPTHER

## 2018-08-16 RX ORDER — OMEPRAZOLE 20 MG/1
CAPSULE, DELAYED RELEASE ORAL
Qty: 90 CAP | Refills: 3 | Status: SHIPPED | OUTPATIENT
Start: 2018-08-16 | End: 2019-03-22

## 2018-11-12 DIAGNOSIS — E03.9 ACQUIRED HYPOTHYROIDISM: ICD-10-CM

## 2018-11-12 NOTE — TELEPHONE ENCOUNTER
is calling requesting a refill on med    Requested Prescriptions     Pending Prescriptions Disp Refills    atorvastatin (LIPITOR) 40 mg tablet 90 Tab 3     Sig: Take 1 Tab by mouth nightly. For cholesterol    atenolol (TENORMIN) 25 mg tablet 180 Tab 3     Sig: Take 2 Tabs by mouth daily.  levothyroxine (SYNTHROID) 137 mcg tablet 90 Tab 3     Sig: Take 137 mcg by mouth Daily (before breakfast).

## 2018-11-13 RX ORDER — LEVOTHYROXINE SODIUM 137 UG/1
137 TABLET ORAL
Qty: 90 TAB | Refills: 1 | Status: SHIPPED | OUTPATIENT
Start: 2018-11-13 | End: 2019-04-25 | Stop reason: SDUPTHER

## 2018-11-13 RX ORDER — ATENOLOL 25 MG/1
50 TABLET ORAL DAILY
Qty: 180 TAB | Refills: 1 | Status: SHIPPED | OUTPATIENT
Start: 2018-11-13 | End: 2019-04-25 | Stop reason: SDUPTHER

## 2018-11-13 RX ORDER — ATORVASTATIN CALCIUM 40 MG/1
40 TABLET, FILM COATED ORAL
Qty: 90 TAB | Refills: 1 | Status: SHIPPED | OUTPATIENT
Start: 2018-11-13 | End: 2019-04-25 | Stop reason: SDUPTHER

## 2018-11-28 ENCOUNTER — TELEPHONE (OUTPATIENT)
Dept: FAMILY MEDICINE CLINIC | Age: 73
End: 2018-11-28

## 2018-11-28 RX ORDER — ALENDRONATE SODIUM 70 MG/1
70 TABLET ORAL
Qty: 12 TAB | Refills: 0 | Status: SHIPPED | OUTPATIENT
Start: 2018-11-28 | End: 2019-02-22 | Stop reason: SDUPTHER

## 2018-11-28 NOTE — TELEPHONE ENCOUNTER
Called pt's  (checked disclosure) and verified name and  of pt. Pt's  wants to have her fosamax refilled for her bone health. Would you consider refilling this for them? Last office visit was in . Thanks!

## 2019-02-22 RX ORDER — ALENDRONATE SODIUM 70 MG/1
70 TABLET ORAL
Qty: 12 TAB | Refills: 3 | Status: SHIPPED | OUTPATIENT
Start: 2019-02-22 | End: 2019-06-06

## 2019-02-22 RX ORDER — MELOXICAM 15 MG/1
15 TABLET ORAL DAILY
Qty: 90 TAB | Refills: 3 | Status: SHIPPED | OUTPATIENT
Start: 2019-02-22 | End: 2019-03-22

## 2019-02-22 NOTE — TELEPHONE ENCOUNTER
Message from [address]  Henri Conde is requesting a Rx for Meloxican and Alendronate called to [address] [place] [address]. He wants the Alendronate to go too [facility]. Husbands number is [phone]. Requested Prescriptions     Pending Prescriptions Disp Refills    alendronate (FOSAMAX) 70 mg tablet 12 Tab 0     Sig: Take 1 Tab by mouth every seven (7) days.  meloxicam (MOBIC) 15 mg tablet 90 Tab 1     Sig: Take 1 Tab by mouth daily.

## 2019-03-18 ENCOUNTER — OFFICE VISIT (OUTPATIENT)
Dept: SLEEP MEDICINE | Age: 74
End: 2019-03-18

## 2019-03-18 ENCOUNTER — TELEPHONE (OUTPATIENT)
Dept: FAMILY MEDICINE CLINIC | Age: 74
End: 2019-03-18

## 2019-03-18 VITALS
HEART RATE: 61 BPM | HEIGHT: 61 IN | OXYGEN SATURATION: 95 % | WEIGHT: 119 LBS | SYSTOLIC BLOOD PRESSURE: 171 MMHG | BODY MASS INDEX: 22.47 KG/M2 | DIASTOLIC BLOOD PRESSURE: 102 MMHG

## 2019-03-18 DIAGNOSIS — R03.0 ELEVATED BLOOD PRESSURE READING IN OFFICE WITHOUT DIAGNOSIS OF HYPERTENSION: ICD-10-CM

## 2019-03-18 DIAGNOSIS — G47.00 INSOMNIA, UNSPECIFIED TYPE: Primary | ICD-10-CM

## 2019-03-18 NOTE — TELEPHONE ENCOUNTER
Patient verified her name and . Patient seen at sleep center. BP elevated 177/105. Advised to be seen this week. Scheduled 3/22/19 Dr. Jagjit Ambriz.

## 2019-03-18 NOTE — PROGRESS NOTES
217 [facility] Nikolas. [place], [address]  Tel.  [phone]  Fax. 100 [facility] 60  [place], [address] [facility]  Tel.  [phone]  Fax. [phone] [address]   Tel.  [phone]  Fax. [phone]         Subjective:      Demetrice Rosenbaum is an 72 y.o. female referred for evaluation for a sleep disorder. She complains of difficulty falling and staying asleep associated with frustration with poor sleep quality. Symptoms began a few years ago, gradually worsening since that time. She usually can fall asleep in 1+hours. Family or house members note no snoring or pauses in breathing. She denies falling asleep while driving. Demetrice Rosenbaum does wake up frequently at night. She is not bothered by waking up too early and left unable to get back to sleep. She actually sleeps about 6 hours at night and wakes up about 3 times during the night. She does not work shifts:  .   Melchor James indicates she does get too little sleep at night. Her bedtime is 2200. She awakens at 0700. She does not take naps. . She has the following observed behaviors: Twitching of legs or feet, Kicking with legs;  . Other remarks:    She says she does a lot of thinking when she is not sleeping. She keeps the bedroom cold  [place] Sleepiness Score: 4      Allergies   Allergen Reactions    Pneumococcal 23-Roxanne Ps Vaccine Swelling         Current Outpatient Medications:     alendronate (FOSAMAX) 70 mg tablet, Take 1 Tab by mouth every seven (7) days. , Disp: 12 Tab, Rfl: 3    meloxicam (MOBIC) 15 mg tablet, Take 1 Tab by mouth daily. , Disp: 90 Tab, Rfl: 3    atorvastatin (LIPITOR) 40 mg tablet, Take 1 Tab by mouth nightly. For cholesterol, Disp: 90 Tab, Rfl: 1    atenolol (TENORMIN) 25 mg tablet, Take 2 Tabs by mouth daily. , Disp: 180 Tab, Rfl: 1    levothyroxine (SYNTHROID) 137 mcg tablet, Take 137 mcg by mouth Daily (before breakfast). , Disp: 90 Tab, Rfl: 1   omeprazole (PRILOSEC) 20 mg capsule, TAKE 1 CAPSULE DAILY, Disp: 90 Cap, Rfl: 3    olopatadine (PATANASE) 0.6 % spry, , Disp: , Rfl:     lutein 20 mg tab, Take  by mouth., Disp: , Rfl:     fluticasone (FLONASE) 50 mcg/actuation nasal spray, 1 spray each nostril twice a day, Disp: 1 Bottle, Rfl: 5    CALCIUM CARBONATE (CALCIUM 600 PO), Take  by mouth., Disp: , Rfl:     aspirin 81 mg chewable tablet, Take 81 mg by mouth daily. , Disp: , Rfl:     cholecalciferol, vitamin d3, 400 unit cap, Take  by mouth., Disp: , Rfl:     multivitamin (ONE A DAY) tablet, Take 1 Tab by mouth daily. , Disp: , Rfl:     fexofenadine (ALLEGRA) 180 mg tablet, Take  by mouth daily. , Disp: , Rfl:      She  has a past medical history of Arrhythmia, GERD (gastroesophageal reflux disease), High cholesterol, HTN (hypertension), Hypothyroid, and Shingles outbreak. She  has a past surgical history that includes hx gyn. She family history includes Cancer in her father; Hypertension in her sister; Stroke in her mother. She  reports that  has never smoked. she has never used smokeless tobacco. She reports that she does not drink alcohol. Review of Systems:  Constitutional:  No significant weight loss or weight gain. Eyes:  No blurred vision. CVS:  No significant chest pain  Pulm:  No significant shortness of breath  GI:  No significant nausea or vomiting  :  +significant nocturia  Musculoskeletal:  No significant joint pain at night  Skin:  No significant rashes  Neuro:  No significant dizziness   Psych:  +anxiety    Sleep Review of Systems: notable for ++ difficulty falling asleep; ++frequent awakenings at night;  rare dreaming noted; no nightmares ; no early morning headaches; no memory problems; no concentration issues; no history of any automobile or occupational accidents due to daytime drowsiness.       Objective:     Visit Vitals  BP (!) 171/102 (BP 1 Location: Right arm, BP Patient Position: Sitting)   Pulse 61   Ht 5' 1\" (1.549 m)   Wt 119 lb (54 kg)   LMP 01/06/1996   SpO2 95%   BMI 22.48 kg/m²         General:   Not in acute distress   Eyes:  Anicteric sclerae, no obvious strabismus   Nose:  No obvious nasal septum deviation    Oropharynx:   Class 3 oropharyngeal outlet, thick tongue base, enlarged and boggy uvula, low-lying soft palate, narrow tonsilo-pharyngeal pilars   Tonsils:   tonsils are present and normal   Neck:   Neck circ. in \"inches\": 12.25; midline trachea   Chest/Lungs:  Equal lung expansion, clear on auscultation    CVS:  Normal rate, regular rhythm; no JVD   Skin:  Warm to touch; no obvious rashes   Neuro:  No focal deficits ; no obvious tremor    Psych:  Normal affect,  normal countenance;          Assessment:       ICD-10-CM ICD-9-CM    1. Insomnia, unspecified type G47.00 780.52    2. Elevated blood pressure reading in office without diagnosis of hypertension R03.0 796.2          Plan:     * The patient currently has a Low Risk for having sleep apnea. STOP-BANG score 2. * {I have advised a regular sleep wake cycle. bedtime will be 11:30 and she should get out of bed by 7 at the latest. she  was advised to avoid looking at the clock during the night. Ideally, the clock face should be turned away. she was advised to minimize caffeine use and to avoid caffeine-containing beverages 8 hours prior to bedtime. Naps were discouraged. A regular exercise schedule, at least 3 hours before bedtime, would be beneficial to improving sleep quality. Relaxation strategies and realistic expectations for sleep were reviewed today. Watching TV, using laptops, tablets and smartphones in the evening was discouraged. she  was advised to keep the bedroom cool and dark. .  All of her questions were addressed. If no significant improvement in 6-8 weeks, she will call and we will consider an attended PSG.   2. Elevated blood pressure reading 0- she will monitor this at grocery store and write down values to review at next appointment withPMD>  Electronically signed by    Austyn Carter MD  Diplomate in Sleep Medicine  [facility]

## 2019-03-18 NOTE — PATIENT INSTRUCTIONS
217 [facility] [place]., [place]. [place], [address]  Tel.  [phone]  Fax. 100 [facility] 60  [place], [address] [facility]  Tel.  [phone]  Fax. [phone] [address]  Tel.  [phone]  Fax. [phone]       Insomnia: After Your Visit  Your Care Instructions  Insomnia is the inability to sleep well. It is a common problem for most people at some time. Insomnia may make it hard for you to get to sleep, stay asleep, or sleep as long as you need to. This can make you tired and grouchy during the day. It can also make you forgetful, less effective at work, and unhappy. Insomnia can be caused by conditions such as depression or anxiety. Pain can also affect your ability to sleep. When these problems are solved, the insomnia usually clears up. But sometimes bad sleep habits can cause insomnia. If insomnia is affecting your work or your enjoyment of life, you can take steps to improve your sleep. Follow-up care is a key part of your treatment and safety. Be sure to make and go to all appointments, and call your doctor if you are having problems. It's also a good idea to know your test results and keep a list of the medicines you take. How can you care for yourself at home? What to avoid  · Do not have drinks with caffeine, such as coffee or black tea, for 8 hours before bed. · Do not smoke or use other types of tobacco near bedtime. Nicotine is a stimulant and can keep you awake. · Avoid drinking alcohol late in the evening, because it can cause you to wake in the middle of the night. · Do not eat a big meal close to bedtime. If you are hungry, eat a light snack. · Do not drink a lot of water close to bedtime, because the need to urinate may wake you up during the night. · Do not read or watch TV in bed. Use the bed only for sleeping and sexual activity.   What to try  · Go to bed at the same time every night, and wake up at the same time every morning. Do not take naps during the day. · Keep your bedroom quiet, dark, and cool. · Get regular exercise, but not within 3 to 4 hours of your bedtime. .  · Sleep on a comfortable pillow and mattress. · If watching the clock makes you anxious, turn it facing away from you so you cannot see the time. · If you worry when you lie down, start a worry book. Well before bedtime, write down your worries, and then set the book and your concerns aside. · Try meditation or other relaxation techniques before you go to bed. · If you cannot fall asleep, get up and go to another room until you feel sleepy. Do something relaxing. Repeat your bedtime routine before you go to bed again. · Make your house quiet and calm about an hour before bedtime. Turn down the lights, turn off the TV, log off the computer, and turn down the volume on music. This can help you relax after a busy day. When should you call for help? Watch closely for changes in your health, and be sure to contact your doctor if:  · Your efforts to improve your sleep do not work. · Your insomnia gets worse. · You fall asleep during the day. Where can you learn more? Go to iBuyitBetter.be  Enter P513 in the search box to learn more about \"Insomnia: After Your Visit. \"   © 6013-9615 Healthwise, Incorporated. Care instructions adapted under license by [facility] (which disclaims liability or warranty for this information). This care instruction is for use with your licensed healthcare professional. If you have questions about a medical condition or this instruction, always ask your healthcare professional. [place] [address] any warranty or liability for your use of this information. Content Version: 8.7.14941; Last Revised: June 26, 2010    Drowsy Driving:  The [facility] 54 cites drowsiness as a causing factor in more than 185,757 police reported crashes annually, resulting in 76,000 injuries and 1,500 deaths. Other surveys suggest 55% of people polled have driven while drowsy in the past year, 23% had fallen asleep but not crashed, 3% crashed, and 2% had and accident due to drowsy driving. Who is at risk? Young Drivers: One study of drowsy driving accidents states that 55% of the drivers were under 25 years. Of those, 75% were male. Shift Workers and Travelers: People who work overnight or travel across time zones frequently are at higher risk of experiencing Circadian Rhythm Disorders. They are trying to work and function when their body is programed to sleep. Sleep Deprived: Lack of sleep has a serious impact on your ability to pay attention or focus on a task. Consistently getting less than the average of 8 hours your body needs creates partial or cumulative sleep deprivation. Untreated Sleep Disorders: Sleep Apnea, Narcolepsy, R.L.S., and other sleep disorders (untreated) prevent a person from getting enough restful sleep. This leads to excessive daytime sleepiness and increases the risk for drowsy driving accidents by up to 7 times. Medications / Alcohol: Even over the counter medications can cause drowsiness. Medications that impair a drivers attention should have a warning label. Alcohol naturally makes you sleepy and on its own can cause accidents. Combined with excessive drowsiness its effects are amplified. Signs of Drowsy Driving:   * You don't remember driving the last few miles   * You may drift out of your karen   * You are unable to focus and your thoughts wander   * You may yawn more often than normal   * You have difficulty keeping your eyes open / nodding off   * Missing traffic signs, speeding, or tailgating  Prevention-   Good sleep hygiene, lifestyle and behavioral choices have the most impact on drowsy driving. There is no substitute for sleep and the average person requires 8 hours nightly.  If you find yourself driving drowsy, stop and sleep. Consider the sleep hygiene tips provided during your visit as well. Medication Refill Policy: Refills for all medications require 1 week advance notice. Please have your pharmacy fax a refill request. We are unable to fax, or call in \"controled substance\" medications and you will need to pick these prescriptions up from our office. [facility] [employer] Activation    Thank you for requesting access to [facility] [employer]. Please follow the instructions below to securely access and download your online medical record. [facility] allows you to send messages to your doctor, view your test results, renew your prescriptions, schedule appointments, and more. How Do I Sign Up? 1. In your internet browser, go to [email or web address]. 2. Click on the First Time User? Click Here link in the Sign In box. You will see the New Member Sign Up page. 3. Enter your [facility] Access Code exactly as it appears below. You will not need to use this code after youve completed the sign-up process. If you do not sign up before the expiration date, you must request a new code. [facility] [employer] Access Code: [email or web address]  Expires: 2019 11:53 AM (This is the date your [employer] access code will )    4. Enter the last four digits of your Social Security Number (xxxx) and Date of Birth (mm/dd/yyyy) as indicated and click Submit. You will be taken to the next sign-up page. 5. Create a [facility] ID. This will be your [facility] login ID and cannot be changed, so think of one that is secure and easy to remember. 6. Create a [facility] password. You can change your password at any time. 7. Enter your Password Reset Question and Answer. This can be used at a later time if you forget your password. 8. Enter your e-mail address. You will receive e-mail notification when new information is available in [address] 9. Click Sign Up. You can now view and download portions of your medical record.   10. Click the Download Summary menu link to download a portable copy of your medical information. Additional Information    If you have questions, please call [phone]. Remember, [employer] is NOT to be used for urgent needs. For medical emergencies, dial 911.

## 2019-03-22 ENCOUNTER — OFFICE VISIT (OUTPATIENT)
Dept: FAMILY MEDICINE CLINIC | Age: 74
End: 2019-03-22

## 2019-03-22 ENCOUNTER — HOSPITAL ENCOUNTER (OUTPATIENT)
Dept: LAB | Age: 74
Discharge: HOME | End: 2019-03-22
Payer: MEDICARE

## 2019-03-22 VITALS
HEART RATE: 60 BPM | SYSTOLIC BLOOD PRESSURE: 145 MMHG | WEIGHT: 120 LBS | RESPIRATION RATE: 14 BRPM | OXYGEN SATURATION: 96 % | TEMPERATURE: 97.7 F | DIASTOLIC BLOOD PRESSURE: 81 MMHG | BODY MASS INDEX: 22.66 KG/M2 | HEIGHT: 61 IN

## 2019-03-22 DIAGNOSIS — I15.9 SECONDARY HYPERTENSION: ICD-10-CM

## 2019-03-22 DIAGNOSIS — Z12.11 COLON CANCER SCREENING: ICD-10-CM

## 2019-03-22 DIAGNOSIS — E78.5 HYPERLIPIDEMIA, UNSPECIFIED HYPERLIPIDEMIA TYPE: ICD-10-CM

## 2019-03-22 DIAGNOSIS — E03.8 OTHER SPECIFIED HYPOTHYROIDISM: ICD-10-CM

## 2019-03-22 DIAGNOSIS — Z23 ENCOUNTER FOR IMMUNIZATION: ICD-10-CM

## 2019-03-22 DIAGNOSIS — M81.0 AGE-RELATED OSTEOPOROSIS WITHOUT CURRENT PATHOLOGICAL FRACTURE: ICD-10-CM

## 2019-03-22 DIAGNOSIS — Z00.00 ROUTINE GENERAL MEDICAL EXAMINATION AT A HEALTH CARE FACILITY: Primary | ICD-10-CM

## 2019-03-22 PROCEDURE — 84443 ASSAY THYROID STIM HORMONE: CPT

## 2019-03-22 PROCEDURE — 80061 LIPID PANEL: CPT

## 2019-03-22 PROCEDURE — 80053 COMPREHEN METABOLIC PANEL: CPT

## 2019-03-22 PROCEDURE — 85025 COMPLETE CBC W/AUTO DIFF WBC: CPT

## 2019-03-22 PROCEDURE — 36415 COLL VENOUS BLD VENIPUNCTURE: CPT

## 2019-03-22 NOTE — PATIENT INSTRUCTIONS
Pneumococcal Conjugate Vaccine (PCV13): What You Need to Know Why get vaccinated? Vaccination can protect both children and adults from pneumococcal disease. Pneumococcal disease is caused by bacteria that can spread from person to person through close contact. It can cause ear infections, and it can also lead to more serious infections of the: 
· Lungs (pneumonia). · Blood (bacteremia). · Covering of the brain and spinal cord (meningitis). Pneumococcal pneumonia is most common among adults. Pneumococcal meningitis can cause deafness and brain damage, and it kills about 1 child in 10 who get it. Anyone can get pneumococcal disease, but children under 3years of age and adults 72 years and older, people with certain medical conditions, and cigarette smokers are at the highest risk. Before there was a vaccine, the [facility] saw the following in children under 5 each year from pneumococcal disease: · More than 700 cases of meningitis · About 13,000 blood infections · About 5 million ear infections · About 200 deaths Since the vaccine became available, severe pneumococcal disease in these children has fallen by 88%. About 18,000 older adults die of pneumococcal disease each year in the [place]. Treatment of pneumococcal infections with penicillin and other drugs is not as effective as it used to be, because some strains of the disease have become resistant to these drugs. This makes prevention of the disease through vaccination even more important. PCV13 vaccine Pneumococcal conjugate vaccine (called PCV13) protects against 13 types of pneumococcal bacteria. PCV13 is routinely given to children at 2, 4, 6, and 1515 months of age. It is also recommended for children and adults 3to 59years of age with certain health conditions, and for all adults 72years of age and older. Your doctor can give you details. Some people should not get this vaccine Anyone who has ever had a life-threatening allergic reaction to a dose of this vaccine, to an earlier pneumococcal vaccine called PCV7, or to any vaccine containing diphtheria toxoid (for example, DTaP), should not get PCV13. Anyone with a severe allergy to any component of PCV13 should not get the vaccine. Tell your doctor if the person being vaccinated has any severe allergies. If the person scheduled for vaccination is not feeling well, your healthcare provider might decide to reschedule the shot on another day. Risks of a vaccine reaction With any medicine, including vaccines, there is a chance of reactions. These are usually mild and go away on their own, but serious reactions are also possible. Problems reported following PCV13 varied by age and dose in the series. The most common problems reported among children were: · About half became drowsy after the shot, had a temporary loss of appetite, or had redness or tenderness where the shot was given. · About 1 out of 3 had swelling where the shot was given. · About 1 out of 3 had a mild fever, and about 1 in 20 had a fever over 102.2°F. 
· Up to about 8 out of 10 became fussy or irritable. Adults have reported pain, redness, and swelling where the shot was given; also mild fever, fatigue, headache, chills, or muscle pain. Teresia Yanely children who get PCV13 along with inactivated flu vaccine at the same time may be at increased risk for seizures caused by fever. Ask your doctor for more information. Problems that could happen after any vaccine: · People sometimes faint after a medical procedure, including vaccination. Sitting or lying down for about 15 minutes can help prevent fainting and the injuries caused by a fall. Tell your doctor if you feel dizzy or have vision changes or ringing in the ears. · Some older children and adults get severe pain in the shoulder and have difficulty moving the arm where a shot was given. This happens very rarely. · Any medication can cause a severe allergic reaction. Such reactions from a vaccine are very rare, estimated at about 1 in a million doses, and would happen within a few minutes to a few hours after the vaccination. As with any medicine, there is a very small chance of a vaccine causing a serious injury or death. The safety of vaccines is always being monitored. For more information, visit: www.cdc.gov/vaccinesafety. What if there is a serious reaction? What should I look for? · Look for anything that concerns you, such as signs of a severe allergic reaction, very high fever, or unusual behavior. Signs of a severe allergic reaction can include hives, swelling of the face and throat, difficulty breathing, a fast heartbeat, dizziness, and weakness, usually within a few minutes to a few hours after the vaccination. What should I do? · If you think it is a severe allergic reaction or other emergency that can't wait, call 911 or get the person to the nearest hospital. Otherwise, call your doctor. · Reactions should be reported to the Vaccine Adverse Event Reporting System (VAERS). Your doctor should file this report, or you can do it yourself through the VAERS website at www.vaers. [facility].gov, or by calling 3-592.190.8112. VAERS does not give medical advice. The National Vaccine Injury Compensation Program 
The National Vaccine Injury Compensation Program (VICP) is a federal program that was created to compensate people who may have been injured by certain vaccines. Persons who believe they may have been injured by a vaccine can learn about the program and about filing a claim by calling 6-843.539.5649 or visiting the [employer] website at www.[facility].gov/vaccinecompensation. There is a time limit to file a claim for compensation. How can I learn more? · Ask your healthcare provider. He or she can give you the vaccine package insert or suggest other sources of information. · Call your local or state health department. · Contact the Centers for Disease Control and Prevention (CDC): 
? Call 5-585.960.7350 (1-800-CDC-INFO) or 
? Visit CDC's website at www.cdc.gov/vaccines Vaccine Information Statement PCV13 Vaccine 11/5/2015 
42 KERA Molina Estimable 902IN-03 Department of [facility] and [place] Centers for Disease Control and Prevention Many Vaccine Information Statements are available in [place]ian and other languages. See www.immunize.org/vis. Muchas hojas de información sobre vacunas están disponibles en español y en otros idiomas. Visite www.immunize.org/vis. Care instructions adapted under license by [employer] (which disclaims liability or warranty for this information). If you have questions about a medical condition or this instruction, always ask your healthcare professional. [address] any warranty or liability for your use of this information.

## 2019-03-22 NOTE — PROGRESS NOTES
Medicare Annual Wellness Visit I have reviewed the patient's medical history in detail and updated the computerized patient record. History Kristen Vasquez is a 72 y.o. female who presents with a concern about her blood pressure. Was at the sleep center being evaluated for need for sleep studies when it was found that her blood pressure was 170/102. She was told to follow-up with PCP. She has been somewhat worried about this. It has been a while since she has had a checkup. We were reviewing her medication list and she was not sure why she was taking most of her medications so a detailed med reconciliation was done Past Medical History:  
Diagnosis Date  Arrhythmia  GERD (gastroesophageal reflux disease)  High cholesterol  HTN (hypertension)  Hypothyroid  Shingles outbreak Past Surgical History:  
Procedure Laterality Date  HX GYN    
 hysterectomy Current Outpatient Medications Medication Sig Dispense Refill  alendronate (FOSAMAX) 70 mg tablet Take 1 Tab by mouth every seven (7) days. 12 Tab 3  
 atorvastatin (LIPITOR) 40 mg tablet Take 1 Tab by mouth nightly. For cholesterol 90 Tab 1  
 atenolol (TENORMIN) 25 mg tablet Take 2 Tabs by mouth daily. 180 Tab 1  
 levothyroxine (SYNTHROID) 137 mcg tablet Take 137 mcg by mouth Daily (before breakfast). 90 Tab 1  
 olopatadine (PATANASE) 0.6 % spry  lutein 20 mg tab Take  by mouth.  fluticasone (FLONASE) 50 mcg/actuation nasal spray 1 spray each nostril twice a day 1 Bottle 5  cholecalciferol, vitamin d3, 400 unit cap Take  by mouth.  multivitamin (ONE A DAY) tablet Take 1 Tab by mouth daily.  fexofenadine (ALLEGRA) 180 mg tablet Take  by mouth daily. Allergies Allergen Reactions  Pneumococcal 23-Roxanne Ps Vaccine Swelling Family History Problem Relation Age of Onset  Hypertension Sister  Stroke Mother  Cancer Father Social History Tobacco Use  
  Smoking status: Never Smoker  Smokeless tobacco: Never Used Substance Use Topics  Alcohol use: No  
 
Patient Active Problem List  
Diagnosis Code  Hyperlipidemia E78.5  Hypothyroidism E03.9  Hypertension I10  
 PVCs (premature ventricular contractions) I49.3  Age-related osteoporosis without current pathological fracture M81.0 Depression Risk Factor Screening:  
 
3 most recent PHQ Screens 3/22/2019 Little interest or pleasure in doing things Not at all Feeling down, depressed, irritable, or hopeless Not at all Total Score PHQ 2 0 Alcohol Risk Factor Screening: On any occasion during the past 3 months, have you had more than 3 drinks containing alcohol? No 
 
Do you average more than 7 drinks per week? No 
 
 
Functional Ability and Level of Safety:  
 
Hearing Loss  
none Activities of Daily Living Self-care. Requires assistance with: no ADLs Fall Risk Fall Risk Assessment, last 12 mths 3/22/2019 Able to walk? Yes Fall in past 12 months? No  
 
Abuse Screen Patient is not abused Review of Systems ROS: 
As listed in HPI. In addition: 
Constitutional:   No headache, fever, fatigue, weight loss or weight gain Eyes:   No redness, pruritis, pain, visual changes, swelling, or discharge Ears:    No pain, loss or changes in hearing Cardiac:    No chest pain Resp:   No cough or shortness of breath Neuro   No loss of consciousness, dizziness, seizure Physical Examination Evaluation of Cognitive Function: 
Mood/affect:  happy Appearance: age appropriate Family member/caregiver input:  
 
Physical Exam: 
Blood pressure 145/81, pulse 60, temperature 97.7 °F (36.5 °C), resp. rate 14, height 5' 1\" (1.549 m), weight 120 lb (54.4 kg), last menstrual period 01/06/1996, SpO2 96 %. GEN: No apparent distress. Alert and oriented and responds to all questions appropriately. EYES:  Conjunctiva clear; pupils round and reactive to light; extraocular movements are intact. EAR: External ears are normal.  Tympanic membranes are clear and without effusion. NOSE: Turbinates are within normal limits. No drainage OROPHYARYNX: No oral lesions or exudates. NECK:  Supple; no masses; thyroid normal          
LUNGS: Respirations unlabored; clear to auscultation bilaterally CARDIOVASCULAR: Regular, rate, and rhythm without murmurs ABDOMEN: Soft; nontender; nondistended; normoactive bowel sounds; no masses or organomegaly NEUROLOGIC:  No focal neurologic deficits. Strength and sensation grossly intact. Coordination and gait grossly intact. EXT: Well perfused. No edema. SKIN: No obvious rashes. Patient Care Team: 
Eldon Epps MD as PCP - General (Internal Medicine) Gala Velasquez MD as Physician (Sleep Medicine) Advice/Referrals/Counseling Education and counseling provided: 
 
Dr. Yumi Mendez does her eye exams Has a family history of colon cancer in her father, last colonoscopy 2008. Told repeat 5 years. Refer for this Had some pain and swelling at injection site when she got her pneumonia 23 years ago. This was erroneously listed as an allergy. Agrees to [employer] 13 Assessment/Plan Hypertension Remains elevated but not as high as it was the sleep center Taking a few medications that might be contributing. Go ahead and stop Mobic among others She would like to see how her blood pressure does not months before adding medication Will probably need a medication, has been quite elevated in the past.  Will probably be adding lisinopril. Polypharmacy Taking calcium, omeprazole, Mobic, stool softener, Gas-X, aspirin. Stop all of these Osteoporosis Does require a dietary source of calcium. We reviewed these foods Is taking Fosamax and vitamin D 3 ICD-10-CM ICD-9-CM 1. Routine general medical examination at a health care facility Z00.00 V70.0 2. Secondary hypertension J67.8 833.67 METABOLIC PANEL, COMPREHENSIVE  
   CBC WITH AUTOMATED DIFF 3. Age-related osteoporosis without current pathological fracture M81.0 733.01   
4. Other specified hypothyroidism E03.8 244.8 TSH 3RD GENERATION 5. Hyperlipidemia, unspecified hyperlipidemia type E78.5 272.4 LIPID PANEL 6. Encounter for immunization Z23 V03.89 PNEUMOCOCCAL CONJ VACCINE 13 VALENT IM  
   ADMIN PNEUMOCOCCAL VACCINE  
7. Colon cancer screening Z12.11 V76.51 REFERRAL FOR COLONOSCOPY

## 2019-03-22 NOTE — PROGRESS NOTES
. 
Chief Complaint Patient presents with  Blood Pressure Check  
  sleep study was not performed  recommended to see PCP about blood pressure Dr Dirk Bernardo 1. Have you been to the ER, urgent care clinic since your last visit? Hospitalized since your last visit? no 
 
2. Have you seen or consulted any other health care providers outside of the [address] [place] [address] since your last visit? Include any pap smears or colon screening. no 
 
. Health Maintenance Due Topic Date Due  
 DTaP/Tdap/Td series (1 - Tdap) 06/03/1968  Shingrix Vaccine Age 50> (1 of 2) 06/03/1997  Pneumococcal 65+ years (1 of 2 - PCV13) 06/03/2012  COLONOSCOPY  04/30/2018  GLAUCOMA SCREENING Q2Y  07/07/2018  Influenza Age 5 to Adult  08/01/2018  MEDICARE YEARLY EXAM  10/28/2018 Reginaldo Stahl Carry

## 2019-03-23 LAB
ALBUMIN SERPL-MCNC: 4.4 G/DL (ref 3.5–4.8)
ALBUMIN/GLOB SERPL: 2.1 {RATIO} (ref 1.2–2.2)
ALP SERPL-CCNC: 49 IU/L (ref 39–117)
ALT SERPL-CCNC: 18 IU/L (ref 0–32)
AST SERPL-CCNC: 22 IU/L (ref 0–40)
BASOPHILS # BLD AUTO: 0 X10E3/UL (ref 0–0.2)
BASOPHILS NFR BLD AUTO: 0 %
BILIRUB SERPL-MCNC: 0.3 MG/DL (ref 0–1.2)
BUN SERPL-MCNC: 15 MG/DL (ref 8–27)
BUN/CREAT SERPL: 22 (ref 12–28)
CALCIUM SERPL-MCNC: 9.1 MG/DL (ref 8.7–10.3)
CHLORIDE SERPL-SCNC: 106 MMOL/L (ref 96–106)
CHOLEST SERPL-MCNC: 147 MG/DL (ref 100–199)
CO2 SERPL-SCNC: 24 MMOL/L (ref 20–29)
CREAT SERPL-MCNC: 0.67 MG/DL (ref 0.57–1)
EOSINOPHIL # BLD AUTO: 0.1 X10E3/UL (ref 0–0.4)
EOSINOPHIL NFR BLD AUTO: 3 %
ERYTHROCYTE [DISTWIDTH] IN BLOOD BY AUTOMATED COUNT: 13.1 % (ref 12.3–15.4)
GLOBULIN SER CALC-MCNC: 2.1 G/DL (ref 1.5–4.5)
GLUCOSE SERPL-MCNC: 89 MG/DL (ref 65–99)
HCT VFR BLD AUTO: 38.1 % (ref 34–46.6)
HDLC SERPL-MCNC: 51 MG/DL
HGB BLD-MCNC: 13.2 G/DL (ref 11.1–15.9)
IMM GRANULOCYTES # BLD AUTO: 0 X10E3/UL (ref 0–0.1)
IMM GRANULOCYTES NFR BLD AUTO: 0 %
INTERPRETATION, 910389: NORMAL
LDLC SERPL CALC-MCNC: 70 MG/DL (ref 0–99)
LYMPHOCYTES # BLD AUTO: 1.4 X10E3/UL (ref 0.7–3.1)
LYMPHOCYTES NFR BLD AUTO: 27 %
MCH RBC QN AUTO: 29.8 PG (ref 26.6–33)
MCHC RBC AUTO-ENTMCNC: 34.6 G/DL (ref 31.5–35.7)
MCV RBC AUTO: 86 FL (ref 79–97)
MONOCYTES # BLD AUTO: 0.5 X10E3/UL (ref 0.1–0.9)
MONOCYTES NFR BLD AUTO: 10 %
NEUTROPHILS # BLD AUTO: 3.1 X10E3/UL (ref 1.4–7)
NEUTROPHILS NFR BLD AUTO: 60 %
PLATELET # BLD AUTO: 211 X10E3/UL (ref 150–379)
POTASSIUM SERPL-SCNC: 4.1 MMOL/L (ref 3.5–5.2)
PROT SERPL-MCNC: 6.5 G/DL (ref 6–8.5)
RBC # BLD AUTO: 4.43 X10E6/UL (ref 3.77–5.28)
SODIUM SERPL-SCNC: 143 MMOL/L (ref 134–144)
TRIGL SERPL-MCNC: 131 MG/DL (ref 0–149)
TSH SERPL DL<=0.005 MIU/L-ACNC: 0.22 UIU/ML (ref 0.45–4.5)
VLDLC SERPL CALC-MCNC: 26 MG/DL (ref 5–40)
WBC # BLD AUTO: 5.1 X10E3/UL (ref 3.4–10.8)

## 2019-04-19 ENCOUNTER — OFFICE VISIT (OUTPATIENT)
Dept: FAMILY MEDICINE CLINIC | Age: 74
End: 2019-04-19

## 2019-04-19 VITALS
DIASTOLIC BLOOD PRESSURE: 72 MMHG | TEMPERATURE: 97.8 F | BODY MASS INDEX: 22.05 KG/M2 | SYSTOLIC BLOOD PRESSURE: 132 MMHG | HEART RATE: 56 BPM | WEIGHT: 116.8 LBS | HEIGHT: 61 IN | OXYGEN SATURATION: 96 % | RESPIRATION RATE: 14 BRPM

## 2019-04-19 DIAGNOSIS — I10 ESSENTIAL HYPERTENSION: Primary | ICD-10-CM

## 2019-04-19 NOTE — PROGRESS NOTES
ELBA Wilson is a 72 y.o. female who presents to follow-up on blood pressure. I saw her for a wellness visit and a concern about her blood pressure 3/22. At that time her concern stems from the fact that her blood pressure was 170/102 at a sleep center. Blood pressure remained elevated at that visit but was much better controlled. I asked her to stop taking NSAIDs to see if this would help her blood pressure. She also had a stressful wedding which she attended and lost 4 pounds in the interim. Blood pressure much better today PMHx: 
Past Medical History:  
Diagnosis Date  Arrhythmia  GERD (gastroesophageal reflux disease)  High cholesterol  HTN (hypertension)  Hypothyroid  Shingles outbreak Meds:  
Current Outpatient Medications Medication Sig Dispense Refill  alendronate (FOSAMAX) 70 mg tablet Take 1 Tab by mouth every seven (7) days. 12 Tab 3  
 atorvastatin (LIPITOR) 40 mg tablet Take 1 Tab by mouth nightly. For cholesterol 90 Tab 1  
 atenolol (TENORMIN) 25 mg tablet Take 2 Tabs by mouth daily. 180 Tab 1  
 levothyroxine (SYNTHROID) 137 mcg tablet Take 137 mcg by mouth Daily (before breakfast). 90 Tab 1  
 lutein 20 mg tab Take  by mouth.  fluticasone (FLONASE) 50 mcg/actuation nasal spray 1 spray each nostril twice a day 1 Bottle 5  cholecalciferol, vitamin d3, 400 unit cap Take  by mouth.  multivitamin (ONE A DAY) tablet Take 1 Tab by mouth daily.  fexofenadine (ALLEGRA) 180 mg tablet Take  by mouth daily.  olopatadine (PATANASE) 0.6 % spry Allergies: Allergies Allergen Reactions  Pneumococcal 23-Roxanne Ps Vaccine Swelling Smoker: 
Social History Tobacco Use Smoking Status Never Smoker Smokeless Tobacco Never Used ETOH:  
Social History Substance and Sexual Activity Alcohol Use No  
 
 
FH:  
Family History Problem Relation Age of Onset  Hypertension Sister  Stroke Mother  Cancer Father ROS:  
As listed in HPI. In addition: 
Constitutional:   No headache, fever, fatigue, weight loss or weight gain Cardiac:    No chest pain Resp:   No cough or shortness of breath Neuro   No loss of consciousness, dizziness, seizures Physical Exam: 
Blood pressure 132/72, pulse (!) 56, temperature 97.8 °F (36.6 °C), resp. rate 14, height 5' 1\" (1.549 m), weight 116 lb 12.8 oz (53 kg), last menstrual period 01/06/1996, SpO2 96 %. GEN: No apparent distress. Alert and oriented and responds to all questions appropriately. NEUROLOGIC:  No focal neurologic deficits. Strength and sensation grossly intact. Coordination and gait grossly intact. EXT: Well perfused. No edema. SKIN: No obvious rashes. Assessment and Plan Hypertension, well controlled Taking atenolol 25 mg Abstaining from NSAIDs Lost a little weight Suggested that she obtain a blood pressure cuff and check her blood pressure at home. She will think about doing this. She would like 6-month follow-up, we will see her in October ICD-10-CM ICD-9-CM 1. Essential hypertension I10 401.9 AVS given. Pt expressed understanding of instructions

## 2019-04-19 NOTE — PROGRESS NOTES
. 
Chief Complaint Patient presents with  Hypertension Marixa Taveras 1. Have you been to the ER, urgent care clinic since your last visit? Hospitalized since your last visit? no 
 
2. Have you seen or consulted any other health care providers outside of the [address] [place] [address] since your last visit? Include any pap smears or colon screening. No 
 
.. Health Maintenance Due Topic Date Due  
 DTaP/Tdap/Td series (1 - Tdap) 06/03/1968  Shingrix Vaccine Age 50> (1 of 2) 06/03/1997  COLONOSCOPY  04/30/2018  GLAUCOMA SCREENING Q2Y  09/12/2018 Berta Bustillos

## 2019-04-25 DIAGNOSIS — E03.9 ACQUIRED HYPOTHYROIDISM: ICD-10-CM

## 2019-04-25 RX ORDER — LEVOTHYROXINE SODIUM 137 UG/1
137 TABLET ORAL
Qty: 90 TAB | Refills: 1 | Status: SHIPPED | OUTPATIENT
Start: 2019-04-25 | End: 2019-08-01 | Stop reason: SDUPTHER

## 2019-04-25 RX ORDER — ATORVASTATIN CALCIUM 40 MG/1
40 TABLET, FILM COATED ORAL
Qty: 90 TAB | Refills: 1 | Status: SHIPPED | OUTPATIENT
Start: 2019-04-25 | End: 2019-11-05 | Stop reason: SDUPTHER

## 2019-04-25 RX ORDER — ATENOLOL 25 MG/1
50 TABLET ORAL DAILY
Qty: 180 TAB | Refills: 1 | Status: SHIPPED | OUTPATIENT
Start: 2019-04-25 | End: 2019-11-05 | Stop reason: SDUPTHER

## 2019-04-25 NOTE — TELEPHONE ENCOUNTER
is calling requesting a refill on med he can be reached at [phone]    Requested Prescriptions     Pending Prescriptions Disp Refills    atenolol (TENORMIN) 25 mg tablet 180 Tab 1     Sig: Take 2 Tabs by mouth daily.  levothyroxine (SYNTHROID) 137 mcg tablet 90 Tab 1     Sig: Take 137 mcg by mouth Daily (before breakfast).  atorvastatin (LIPITOR) 40 mg tablet 90 Tab 1     Sig: Take 1 Tab by mouth nightly.  For cholesterol

## 2019-06-06 RX ORDER — LORATADINE 10 MG/1
10 TABLET ORAL
COMMUNITY
End: 2022-01-05

## 2019-06-07 ENCOUNTER — ANESTHESIA (OUTPATIENT)
Dept: ENDOSCOPY | Age: 74
End: 2019-06-07
Payer: MEDICARE

## 2019-06-07 ENCOUNTER — HOSPITAL ENCOUNTER (OUTPATIENT)
Age: 74
Setting detail: OUTPATIENT SURGERY
Discharge: HOME | End: 2019-06-07
Attending: SPECIALIST | Admitting: SPECIALIST
Payer: MEDICARE

## 2019-06-07 ENCOUNTER — ANESTHESIA EVENT (OUTPATIENT)
Dept: ENDOSCOPY | Age: 74
End: 2019-06-07
Payer: MEDICARE

## 2019-06-07 VITALS
DIASTOLIC BLOOD PRESSURE: 67 MMHG | WEIGHT: 113 LBS | RESPIRATION RATE: 15 BRPM | OXYGEN SATURATION: 98 % | TEMPERATURE: 98.5 F | SYSTOLIC BLOOD PRESSURE: 166 MMHG | HEART RATE: 69 BPM | BODY MASS INDEX: 21.34 KG/M2 | HEIGHT: 61 IN

## 2019-06-07 PROCEDURE — 74011250636 HC RX REV CODE- 250/636

## 2019-06-07 PROCEDURE — 74011250637 HC RX REV CODE- 250/637: Performed by: SPECIALIST

## 2019-06-07 PROCEDURE — 76060000031 HC ANESTHESIA FIRST 0.5 HR: Performed by: SPECIALIST

## 2019-06-07 PROCEDURE — 76040000019: Performed by: SPECIALIST

## 2019-06-07 PROCEDURE — 74011250636 HC RX REV CODE- 250/636: Performed by: SPECIALIST

## 2019-06-07 RX ORDER — NALOXONE HYDROCHLORIDE 0.4 MG/ML
0.4 INJECTION, SOLUTION INTRAMUSCULAR; INTRAVENOUS; SUBCUTANEOUS
Status: DISCONTINUED | OUTPATIENT
Start: 2019-06-07 | End: 2019-06-07 | Stop reason: HOSPADM

## 2019-06-07 RX ORDER — SODIUM CHLORIDE 0.9 % (FLUSH) 0.9 %
5-40 SYRINGE (ML) INJECTION AS NEEDED
Status: DISCONTINUED | OUTPATIENT
Start: 2019-06-07 | End: 2019-06-07 | Stop reason: HOSPADM

## 2019-06-07 RX ORDER — SODIUM CHLORIDE 9 MG/ML
50 INJECTION, SOLUTION INTRAVENOUS CONTINUOUS
Status: DISCONTINUED | OUTPATIENT
Start: 2019-06-07 | End: 2019-06-07 | Stop reason: HOSPADM

## 2019-06-07 RX ORDER — PROPOFOL 10 MG/ML
INJECTION, EMULSION INTRAVENOUS AS NEEDED
Status: DISCONTINUED | OUTPATIENT
Start: 2019-06-07 | End: 2019-06-07 | Stop reason: HOSPADM

## 2019-06-07 RX ORDER — SODIUM CHLORIDE 0.9 % (FLUSH) 0.9 %
5-40 SYRINGE (ML) INJECTION EVERY 8 HOURS
Status: DISCONTINUED | OUTPATIENT
Start: 2019-06-07 | End: 2019-06-07 | Stop reason: HOSPADM

## 2019-06-07 RX ORDER — LIDOCAINE HYDROCHLORIDE 20 MG/ML
INJECTION, SOLUTION EPIDURAL; INFILTRATION; INTRACAUDAL; PERINEURAL AS NEEDED
Status: DISCONTINUED | OUTPATIENT
Start: 2019-06-07 | End: 2019-06-07 | Stop reason: HOSPADM

## 2019-06-07 RX ORDER — FLUMAZENIL 0.1 MG/ML
0.2 INJECTION INTRAVENOUS
Status: DISCONTINUED | OUTPATIENT
Start: 2019-06-07 | End: 2019-06-07 | Stop reason: HOSPADM

## 2019-06-07 RX ORDER — DEXTROMETHORPHAN/PSEUDOEPHED 2.5-7.5/.8
1.2 DROPS ORAL
Status: DISCONTINUED | OUTPATIENT
Start: 2019-06-07 | End: 2019-06-07 | Stop reason: HOSPADM

## 2019-06-07 RX ORDER — MIDAZOLAM HYDROCHLORIDE 1 MG/ML
.25-5 INJECTION, SOLUTION INTRAMUSCULAR; INTRAVENOUS
Status: DISCONTINUED | OUTPATIENT
Start: 2019-06-07 | End: 2019-06-07 | Stop reason: HOSPADM

## 2019-06-07 RX ORDER — FENTANYL CITRATE 50 UG/ML
50 INJECTION, SOLUTION INTRAMUSCULAR; INTRAVENOUS
Status: DISCONTINUED | OUTPATIENT
Start: 2019-06-07 | End: 2019-06-07 | Stop reason: HOSPADM

## 2019-06-07 RX ADMIN — SIMETHICONE 80 MG: 20 SUSPENSION/ DROPS ORAL at 11:28

## 2019-06-07 RX ADMIN — SODIUM CHLORIDE 50 ML/HR: 900 INJECTION, SOLUTION INTRAVENOUS at 10:28

## 2019-06-07 RX ADMIN — PROPOFOL 200 MG: 10 INJECTION, EMULSION INTRAVENOUS at 11:37

## 2019-06-07 RX ADMIN — LIDOCAINE HYDROCHLORIDE 20 MG: 20 INJECTION, SOLUTION EPIDURAL; INFILTRATION; INTRACAUDAL; PERINEURAL at 11:19

## 2019-06-07 NOTE — ANESTHESIA POSTPROCEDURE EVALUATION
Procedure(s):  COLONOSCOPY.    total IV anesthesia    Anesthesia Post Evaluation        Patient location during evaluation: PACU  Note status: Adequate. Level of consciousness: responsive to verbal stimuli and sleepy but conscious  Pain management: satisfactory to patient  Airway patency: patent  Anesthetic complications: no  Cardiovascular status: acceptable  Respiratory status: acceptable  Hydration status: acceptable  Comments: +Post-Anesthesia Evaluation and Assessment    Patient: Genevie Shone MRN: [id number]  SSN: [id number]   YOB: 1945  Age: 69 y.o. Sex: female      Cardiovascular Function/Vital Signs    /67   Pulse 69   Temp 36.9 °C (98.5 °F)   Resp 15   Ht 5' 1\" (1.549 m)   Wt 51.3 kg (113 lb)   SpO2 98%   Breastfeeding? Yes   BMI 21.35 kg/m²     Patient is status post Procedure(s):  COLONOSCOPY. Nausea/Vomiting: Controlled. Postoperative hydration reviewed and adequate. Pain:  Pain Scale 1: Numeric (0 - 10) (06/07/19 1150)  Pain Intensity 1: 0 (06/07/19 1150)   Managed. Neurological Status: At baseline. Mental Status and Level of Consciousness: Arousable. Pulmonary Status:   O2 Device: Room air (06/07/19 1157)   Adequate oxygenation and airway patent. Complications related to anesthesia: None    Post-anesthesia assessment completed. No concerns. Signed By: Tova Mcclellan MD    6/7/2019  Post anesthesia nausea and vomiting:  controlled      Vitals Value Taken Time   /69 6/7/2019 12:05 PM   Temp     Pulse 61 6/7/2019 12:07 PM   Resp 13 6/7/2019 12:07 PM   SpO2 99 % 6/7/2019 12:07 PM   Vitals shown include unvalidated device data.

## 2019-06-07 NOTE — ROUTINE PROCESS
Kalyan Ridley  1945  [id number]    Situation:  Verbal report received from: Mayte England RN  Procedure: Procedure(s):  COLONOSCOPY    Background:    Preoperative diagnosis: screening  Postoperative diagnosis: diverticulosis; anal tags    :  Dr. Catalina Patterson  Assistant(s): Endoscopy Technician-1: Abdelrahman Dalal Breath  Endoscopy RN-1: Tammie Feliz    Specimens: * No specimens in log *  H. Pylori  no    Assessment:  Intra-procedure medications     Anesthesia gave intra-procedure sedation and medications, see anesthesia flow sheet yes    Intravenous fluids: NS@ KVO     Vital signs stable     Abdominal assessment: round and soft     Recommendation:  Discharge patient per MD order.   Family or Friend , Fidelina Paci to share finding with family or friend yes

## 2019-06-07 NOTE — H&P
Gastroenterology Outpatient History and Physical    Patient: Carlos Jimenes    Physician: Jose Carlos Sotelo MD    Vital Signs: Blood pressure (!) 163/96, pulse 77, temperature 98.5 °F (36.9 °C), resp. rate 20, height 5' 1\" (1.549 m), weight 51.3 kg (113 lb), last menstrual period 01/06/1996, SpO2 98 %, currently breastfeeding. Allergies: Allergies   Allergen Reactions    Pneumococcal 23-Roxanne Ps Vaccine Swelling       Chief Complaint: Screening    History of Present Illness: 70 yo WF for screening colonoscopy. Justification for Procedure: above    History:  Past Medical History:   Diagnosis Date    Arrhythmia     GERD (gastroesophageal reflux disease)     High cholesterol     HTN (hypertension)     Hypothyroid     Shingles outbreak       Past Surgical History:   Procedure Laterality Date    HX GYN      hysterectomy      Social History     Socioeconomic History    Marital status:      Spouse name: Not on file    Number of children: Not on file    Years of education: Not on file    Highest education level: Not on file   Tobacco Use    Smoking status: Never Smoker    Smokeless tobacco: Never Used   Substance and Sexual Activity    Alcohol use: No    Sexual activity: Yes     Partners: Male   Other Topics Concern   Social History Narrative    . Retired. Family History   Problem Relation Age of Onset    Hypertension Sister     Stroke Mother     Cancer Father        Medications:   Prior to Admission medications    Medication Sig Start Date End Date Taking? Authorizing Provider   loratadine (CLARITIN) 10 mg tablet Take 10 mg by mouth. Yes Provider, Historical   atenolol (TENORMIN) 25 mg tablet Take 2 Tabs by mouth daily. 4/25/19  Yes Makayla Menjivar MD   levothyroxine (SYNTHROID) 137 mcg tablet Take 137 mcg by mouth Daily (before breakfast). 4/25/19  Yes Makayla Menjivar MD   atorvastatin (LIPITOR) 40 mg tablet Take 1 Tab by mouth nightly.  For cholesterol 4/25/19  Yes Cristina Dallas Dennis MD   olopatadine (PATANASE) 0.6 % spry  5/1/18  Yes Provider, Historical   lutein 20 mg tab Take  by mouth. Yes Provider, Historical   fluticasone (FLONASE) 50 mcg/actuation nasal spray 1 spray each nostril twice a day 10/13/14  Yes Yani Anne MD   cholecalciferol, vitamin d3, 400 unit cap Take  by mouth. Provider, Historical       Physical Exam:   General: alert, no distress   HEENT: Head: Normocephalic, no lesions, without obvious abnormality.    Heart: regular rate and rhythm, S1, S2 normal, no murmur, click, rub or gallop   Lungs: chest clear, no wheezing, rales, normal symmetric air entry   Abdominal: soft, NT/ND + BS   Neurological: Grossly normal   Extremities: extremities normal, atraumatic, no cyanosis or edema     Findings/Diagnosis: Screening colonoscopy    Plan of Care/Planned Procedure: Colonoscopy

## 2019-06-07 NOTE — ANESTHESIA PREPROCEDURE EVALUATION
Relevant Problems   No relevant active problems       Anesthetic History   No history of anesthetic complications            Review of Systems / Medical History  Patient summary reviewed, nursing notes reviewed and pertinent labs reviewed    Pulmonary  Within defined limits                 Neuro/Psych   Within defined limits           Cardiovascular  Within defined limits  Hypertension        Dysrhythmias : PVC  Hyperlipidemia    Exercise tolerance: >4 METS     GI/Hepatic/Renal  Within defined limits   GERD           Endo/Other  Within defined limits    Hypothyroidism       Other Findings              Physical Exam    Airway  Mallampati: II  TM Distance: 4 - 6 cm  Neck ROM: normal range of motion   Mouth opening: Normal     Cardiovascular  Regular rate and rhythm,  S1 and S2 normal,  no murmur, click, rub, or gallop             Dental  No notable dental hx       Pulmonary  Breath sounds clear to auscultation               Abdominal  GI exam deferred       Other Findings            Anesthetic Plan    ASA: 2  Anesthesia type: general and total IV anesthesia          Induction: Intravenous  Anesthetic plan and risks discussed with: Patient      Propofol MAC

## 2019-06-07 NOTE — PROCEDURES
Colonoscopy Procedure Note    Indications:   Screening colonoscopy    Referring Physician: Stephanie Mercer MD  Anesthesia/Sedation: MAC anesthesia Propofol  Endoscopist:  Dr. Naveed Rojas    Procedure in Detail:  Informed consent was obtained for the procedure, including sedation. Risks of perforation, hemorrhage, adverse drug reaction, and aspiration were discussed. The patient was placed in the left lateral decubitus position. Based on the pre-procedure assessment, including review of the patient's medical history, medications, allergies, and review of systems, she had been deemed to be an appropriate candidate for moderate sedation; she was therefore sedated with the medications listed above. The patient was monitored continuously with ECG tracing, pulse oximetry, blood pressure monitoring, and direct observations. A rectal examination was performed. The [id number] was inserted into the rectum and advanced under direct vision to the cecum, which was identified by the ileocecal valve and appendiceal orifice. The quality of the colonic preparation was adequate. A careful inspection was made as the colonoscope was withdrawn, including a retroflexed view of the rectum; findings and interventions are described below. Appropriate photodocumentation was obtained. Findings:     1. Scope advanced to the cecum. 2.  Preparation was adequate. 3.  No polyps seen. 4.  Mild sigmoid diverticulosis. 5.  Anal skin tags. Therapies:  none    Specimen:  none     Complications: None were encountered during the procedure. EBL: < 10 ml. Recommendations:   -Repeat colonoscopy in 10 years.     Signed By: Santosh Parker MD                        June 7, 2019

## 2019-06-07 NOTE — DISCHARGE INSTRUCTIONS
Liamay Kurtz  [id number]  1945    COLON DISCHARGE INSTRUCTIONS  Discomfort:  Redness at IV site- apply warm compress to area; if redness or soreness persist- contact your physician  There may be a slight amount of blood passed from the rectum  Gaseous discomfort- walking, belching will help relieve any discomfort  You may not operate a vehicle for 12 hours  You may not engage in an occupation involving machinery or appliances for rest of today  You may not drink alcoholic beverages for at least 12 hours  Avoid making any critical decisions for at least 24 hour  DIET:   Regular diet. - however -  remember your colon is empty and a heavy meal will produce gas. Avoid these foods:  vegetables, fried / greasy foods, carbonated drinks for today. MEDICATIONS:        Regarding Aspirin or Nonsteroidal medications, please see below. ACTIVITY:  You may resume your normal daily activities it is recommended that you spend the remainder of the day resting -  avoid any strenuous activity. CALL M.D. ANY SIGN OF:  Increasing pain, nausea, vomiting  Abdominal distension (swelling)  New increased bleeding (oral or rectal)  Fever (chills)  Tylenol as needed for pain.       Follow-up Instructions:   Call Dr. Page Leo For questions about procedure at telephone #  [phone]              Repeat Colonoscopy in 10 years

## 2019-08-01 DIAGNOSIS — E03.9 ACQUIRED HYPOTHYROIDISM: ICD-10-CM

## 2019-08-01 RX ORDER — LEVOTHYROXINE SODIUM 137 UG/1
137 TABLET ORAL
Qty: 90 TAB | Refills: 1 | Status: SHIPPED | OUTPATIENT
Start: 2019-08-01 | End: 2020-01-28

## 2019-08-01 NOTE — TELEPHONE ENCOUNTER
Requested Prescriptions     Pending Prescriptions Disp Refills    levothyroxine (SYNTHROID) 137 mcg tablet 90 Tab 1     Sig: Take 137 mcg by mouth Daily (before breakfast). Requested by patient to go to [facility].

## 2019-08-07 ENCOUNTER — TELEPHONE (OUTPATIENT)
Dept: FAMILY MEDICINE CLINIC | Age: 74
End: 2019-08-07

## 2019-08-07 NOTE — TELEPHONE ENCOUNTER
is calling in ref to med and being told to stop taking med after being on for 5 years he thinks she has been on for this time wants someone to check on this

## 2019-08-07 NOTE — TELEPHONE ENCOUNTER
Let Mr. Turnerraleigh Sirisha know 5 years will not be until October of 2021 and Dr. Sergo Alejo wrote in his note to continue Fosamax.  He voiced understanding

## 2019-09-17 ENCOUNTER — TELEPHONE (OUTPATIENT)
Dept: FAMILY MEDICINE CLINIC | Age: 74
End: 2019-09-17

## 2019-09-17 NOTE — TELEPHONE ENCOUNTER
is calling in ref to hearing on tv needing to contact provider about zantac and causing cancer he would like a call back with what she should do  He can be reached at [phone]

## 2019-09-18 NOTE — TELEPHONE ENCOUNTER
This is referring to a contaminant that is found in some Zantac bottles. The contamination happens at the manufacturing plant so only certain lots are affected. I believe this only applied the prescription Zantac. There should be a list available at the FDA website for the affected lots. The lot information is available on the bottle she is using.   If they have a concern they can either stop taking the medicine and or purchase Zantac and an unaffected lot or switch over to Pepcid

## 2019-09-19 NOTE — TELEPHONE ENCOUNTER
Called pt's , verified name and . Informed pt that per Dr. Annabella Omer, this is referring to a contaminant that is found in some Zantac bottles. The contamination happens at the manufacturing plant so only certain lots are affected. I believe this only applied the prescription Zantac. There should be a list available at the FDA website for the affected lots. The lot information is available on the bottle she is using. If they have a concern they can either stop taking the medicine and or purchase Zantac and an unaffected lot or switch over to Pepcid. Pt stated understanding. They also scheduled an appt to have her medication levels evaluated.

## 2019-10-15 ENCOUNTER — HOSPITAL ENCOUNTER (OUTPATIENT)
Dept: LAB | Age: 74
Discharge: HOME | End: 2019-10-15
Payer: MEDICARE

## 2019-10-15 ENCOUNTER — OFFICE VISIT (OUTPATIENT)
Dept: FAMILY MEDICINE CLINIC | Age: 74
End: 2019-10-15

## 2019-10-15 VITALS
SYSTOLIC BLOOD PRESSURE: 161 MMHG | TEMPERATURE: 97.8 F | DIASTOLIC BLOOD PRESSURE: 85 MMHG | BODY MASS INDEX: 22.05 KG/M2 | HEART RATE: 56 BPM | WEIGHT: 116.8 LBS | OXYGEN SATURATION: 95 % | HEIGHT: 61 IN | RESPIRATION RATE: 12 BRPM

## 2019-10-15 DIAGNOSIS — E78.5 HYPERLIPIDEMIA, UNSPECIFIED HYPERLIPIDEMIA TYPE: ICD-10-CM

## 2019-10-15 DIAGNOSIS — E03.9 ACQUIRED HYPOTHYROIDISM: ICD-10-CM

## 2019-10-15 DIAGNOSIS — I10 ESSENTIAL HYPERTENSION: Primary | ICD-10-CM

## 2019-10-15 PROCEDURE — 84443 ASSAY THYROID STIM HORMONE: CPT

## 2019-10-15 PROCEDURE — 80061 LIPID PANEL: CPT

## 2019-10-15 RX ORDER — HYDROCHLOROTHIAZIDE 12.5 MG/1
12.5 TABLET ORAL DAILY
Qty: 90 TAB | Refills: 3 | Status: SHIPPED | OUTPATIENT
Start: 2019-10-15 | End: 2020-08-03 | Stop reason: SDUPTHER

## 2019-10-15 RX ORDER — ALENDRONATE SODIUM 70 MG/1
70 TABLET ORAL
COMMUNITY
Start: 2019-08-07 | End: 2020-02-04

## 2019-10-15 NOTE — PROGRESS NOTES
1. Have you been to the ER, urgent care clinic since your last visit? Hospitalized since your last visit? No    2. Have you seen or consulted any other health care providers outside of the [address] [place] [address] since your last visit? Include any pap smears or colon screening. Yes Dr. Marcelle Chong (Dermatology     Health Maintenance Due   Topic Date Due    DTaP/Tdap/Td series (1 - Tdap) 06/03/1968    Shingrix Vaccine Age 50> (1 of 2) 06/03/1997    GLAUCOMA SCREENING Q2Y  09/12/2018    Influenza Age 9 to Adult  08/01/2019     Do you have an [address] in place in the event that you have a healthcare crisis that could impact your decision making as it pertains to your health? NO    Would you like information about Advance Care Planning? NO    Information given.  NO

## 2019-10-15 NOTE — PROGRESS NOTES
HPI  Alex Ferrer is a 69 y.o. female who presents to follow-up on chronic medical issues. Blood pressure is high today. Has been in the past.   has a blood pressure cuff but she does not use it. Had a scare and March where her blood pressure was 170/102 out of sleep center. When she followed up with me a few weeks later her blood pressure was fine. Tolerating atenolol        PMHx:  Past Medical History:   Diagnosis Date    Arrhythmia     GERD (gastroesophageal reflux disease)     High cholesterol     HTN (hypertension)     Hypothyroid     Shingles outbreak        Meds:   Current Outpatient Medications   Medication Sig Dispense Refill    alendronate (FOSAMAX) 70 mg tablet Take 70 mg by mouth every seven (7) days.  hydroCHLOROthiazide (HYDRODIURIL) 12.5 mg tablet Take 1 Tab by mouth daily. 90 Tab 3    levothyroxine (SYNTHROID) 137 mcg tablet Take 137 mcg by mouth Daily (before breakfast). 90 Tab 1    loratadine (CLARITIN) 10 mg tablet Take 10 mg by mouth.  atenolol (TENORMIN) 25 mg tablet Take 2 Tabs by mouth daily. 180 Tab 1    atorvastatin (LIPITOR) 40 mg tablet Take 1 Tab by mouth nightly. For cholesterol 90 Tab 1    olopatadine (PATANASE) 0.6 % spry 2 Squirts by Both Nostrils route as needed.  lutein 20 mg tab Take 20 mg by mouth daily.  fluticasone (FLONASE) 50 mcg/actuation nasal spray 1 spray each nostril twice a day (Patient taking differently: 2 Sprays by Both Nostrils route as needed. 1 spray each nostril twice a day) 1 Bottle 5    cholecalciferol, vitamin d3, 400 unit cap Take  by mouth. Allergies:    Allergies   Allergen Reactions    Pneumococcal 23-Roxanne Ps Vaccine Swelling       Smoker:  Social History     Tobacco Use   Smoking Status Never Smoker   Smokeless Tobacco Never Used       ETOH:   Social History     Substance and Sexual Activity   Alcohol Use No       FH:   Family History   Problem Relation Age of Onset    Hypertension Sister     Stroke Mother     Cancer Father        ROS:   As listed in HPI. In addition:  Constitutional:   No headache, fever, fatigue, weight loss or weight gain      Cardiac:    No chest pain      Resp:   No cough or shortness of breath      Neuro   No loss of consciousness, dizziness, seizures      Physical Exam:  Blood pressure 161/85, pulse (!) 56, temperature 97.8 °F (36.6 °C), temperature source Oral, resp. rate 12, height 5' 1\" (1.549 m), weight 116 lb 12.8 oz (53 kg), last menstrual period 01/06/1996, SpO2 95 %, not currently breastfeeding. GEN: No apparent distress. Alert and oriented and responds to all questions appropriately. NEUROLOGIC:  No focal neurologic deficits. Strength and sensation grossly intact. Coordination and gait grossly intact. EXT: Well perfused. No edema. SKIN: Few spots on her right thigh that are pictured below. These are small hypervascular versus allergic areas. There is a central dimple suggesting a bug bite. There is some subtle blood spotting suggesting broken blood vessels. There is a little skin change suggesting eczema. It is pruritic to patient. Unfortunately he has been putting alcohol and witch hazel on it    Lungs clear to auscultation bilaterally  CV regular rate rhythm normal clear tympanic membrane clear his mucosa clear oromucosa. Back and neck examined. Having some pain in the bilateral trapezius, right rhomboid, right latissimus                 Assessment and Plan     Hypertension  Try HCTZ 12.5 mg, double to 25 mg if needed  Take your blood pressure at home. Return in 2 weeks for nurse visit blood pressure check and to confirm accuracy of your home machine    Hypothyroid  TSH    Hyperlipidemia  Lipid panel    Back pain  Stiffens with rest.  Chiropractor been helpful for a few days. Suggested either massage or physical therapy. Gave her Magdalena Gum physical therapy order    Skin lesions  Look like bug bites but have a subtle vasculitis quality to them.   Has had a few other spots that she estimates took 2-3 months to improve but difficult to say since they have been putting caustic topical treatments on it. Try hydrocortisone and sensitive skin lotion, see if that helps. Pain attention to new lesions      ICD-10-CM ICD-9-CM    1. Essential hypertension I10 401.9    2. Acquired hypothyroidism E03.9 244.9 TSH 3RD GENERATION   3. Hyperlipidemia, unspecified hyperlipidemia type E78.5 272.4 LIPID PANEL       AVS given.  Pt expressed understanding of instructions

## 2019-10-16 LAB
CHOLEST SERPL-MCNC: 155 MG/DL (ref 100–199)
HDLC SERPL-MCNC: 57 MG/DL
INTERPRETATION, 910389: NORMAL
LDLC SERPL CALC-MCNC: 73 MG/DL (ref 0–99)
TRIGL SERPL-MCNC: 124 MG/DL (ref 0–149)
TSH SERPL DL<=0.005 MIU/L-ACNC: 0.91 UIU/ML (ref 0.45–4.5)
VLDLC SERPL CALC-MCNC: 25 MG/DL (ref 5–40)

## 2019-10-18 ENCOUNTER — CLINICAL SUPPORT (OUTPATIENT)
Dept: FAMILY MEDICINE CLINIC | Age: 74
End: 2019-10-18

## 2019-10-18 VITALS — TEMPERATURE: 97.3 F

## 2019-10-18 DIAGNOSIS — Z23 ENCOUNTER FOR IMMUNIZATION: ICD-10-CM

## 2019-10-18 NOTE — PROGRESS NOTES
The patient presents with flu immunization/s administered 10/18/2019 by Denise Aldana LPN per . Patient tolerated procedure well. No reactions noted. Fluad given into left deltoid.

## 2019-10-18 NOTE — PATIENT INSTRUCTIONS
Vaccine Information Statement    Influenza (Flu) Vaccine (Inactivated or Recombinant): What You Need to Know    Many Vaccine Information Statements are available in Yoruba and other languages. See www.immunize.org/vis  Hojas de información sobre vacunas están disponibles en español y en muchos otros idiomas. Visite www.immunize.org/vis    1. Why get vaccinated? Influenza vaccine can prevent influenza (flu). Flu is a contagious disease that spreads around the [facility] every year, usually between October and May. Anyone can get the flu, but it is more dangerous for some people. Infants and young children, people 72years of age and older, pregnant women, and people with certain health conditions or a weakened immune system are at greatest risk of flu complications. Pneumonia, bronchitis, sinus infections and ear infections are examples of flu-related complications. If you have a medical condition, such as heart disease, cancer or diabetes, flu can make it worse. Flu can cause fever and chills, sore throat, muscle aches, fatigue, cough, headache, and runny or stuffy nose. Some people may have vomiting and diarrhea, though this is more common in children than adults. Each year thousands of people in the [facility] die from flu, and many more are hospitalized. Flu vaccine prevents millions of illnesses and flu-related visits to the doctor each year. 2. Influenza vaccines     CDC recommends everyone 10months of age and older get vaccinated every flu season. Children 6 months through 6years of age may need 2 doses during a single flu season. Everyone else needs only 1 dose each flu season. It takes about 2 weeks for protection to develop after vaccination. There are many flu viruses, and they are always changing. Each year a new flu vaccine is made to protect against three or four viruses that are likely to cause disease in the upcoming flu season.  Even when the vaccine doesnt exactly match these viruses, it may still provide some protection. Influenza vaccine does not cause flu. Influenza vaccine may be given at the same time as other vaccines. 3. Talk with your health care provider    Tell your vaccine provider if the person getting the vaccine:   Has had an allergic reaction after a previous dose of influenza vaccine, or has any severe, life-threatening allergies.  Has ever had Guillain-Barré Syndrome (also called GBS). In some cases, your health care provider may decide to postpone influenza vaccination to a future visit. People with minor illnesses, such as a cold, may be vaccinated. People who are moderately or severely ill should usually wait until they recover before getting influenza vaccine. Your health care provider can give you more information. 4. Risks of a reaction     Soreness, redness, and swelling where shot is given, fever, muscle aches, and headache can happen after influenza vaccine.  There may be a very small increased risk of Guillain-Barré Syndrome (GBS) after inactivated influenza vaccine (the flu shot). Milad Joshi children who get the flu shot along with pneumococcal vaccine (PCV13), and/or DTaP vaccine at the same time might be slightly more likely to have a seizure caused by fever. Tell your health care provider if a child who is getting flu vaccine has ever had a seizure. People sometimes faint after medical procedures, including vaccination. Tell your provider if you feel dizzy or have vision changes or ringing in the ears. As with any medicine, there is a very remote chance of a vaccine causing a severe allergic reaction, other serious injury, or death. 5. What if there is a serious problem? An allergic reaction could occur after the vaccinated person leaves the clinic.  If you see signs of a severe allergic reaction (hives, swelling of the face and throat, difficulty breathing, a fast heartbeat, dizziness, or weakness), call 9-1-1 and get the person to the nearest hospital.    For other signs that concern you, call your health care provider. Adverse reactions should be reported to the Vaccine Adverse Event Reporting System (VAERS). Your health care provider will usually file this report, or you can do it yourself. Visit the VAERS website at www.vaers. [facility].gov or call 1-640.307.6655. VAERS is only for reporting reactions, and VAERS staff do not give medical advice. 6. The National Vaccine Injury Compensation Program    The formerly [facility] Vaccine Injury Compensation Program (VICP) is a federal program that was created to compensate people who may have been injured by certain vaccines. Visit the VICP website at www.[facility].gov/vaccinecompensation or call 0-225.798.3364 to learn about the program and about filing a claim. There is a time limit to file a claim for compensation. 7. How can I learn more?  Ask your health care provider.  Call your local or state health department.  Contact the Centers for Disease Control and Prevention (CDC):  - Call 0-825.316.3015 (3-414-ALT-INFO) or  - Visit CDCs influenza website at www.cdc.gov/flu    Vaccine Information Statement (Interim)  Inactivated Influenza Vaccine   8/15/2019  [address] [id number]   Department of Health and Human Services  Centers for Disease Control and Prevention    Office Use Only

## 2019-10-28 ENCOUNTER — TELEPHONE (OUTPATIENT)
Dept: FAMILY MEDICINE CLINIC | Age: 74
End: 2019-10-28

## 2019-10-28 NOTE — TELEPHONE ENCOUNTER
Patient's  says that bp med is \"working like a charm\". Had to cancel oct 31st appt but will resched.

## 2019-11-05 RX ORDER — ATENOLOL 25 MG/1
TABLET ORAL
Qty: 180 TAB | Refills: 4 | Status: SHIPPED | OUTPATIENT
Start: 2019-11-05 | End: 2020-11-10

## 2019-11-05 RX ORDER — ATORVASTATIN CALCIUM 40 MG/1
TABLET, FILM COATED ORAL
Qty: 90 TAB | Refills: 4 | Status: SHIPPED | OUTPATIENT
Start: 2019-11-05 | End: 2020-12-02

## 2019-12-03 ENCOUNTER — OFFICE VISIT (OUTPATIENT)
Dept: FAMILY MEDICINE CLINIC | Age: 74
End: 2019-12-03

## 2019-12-03 VITALS
WEIGHT: 115 LBS | DIASTOLIC BLOOD PRESSURE: 75 MMHG | TEMPERATURE: 98.5 F | SYSTOLIC BLOOD PRESSURE: 127 MMHG | HEIGHT: 61 IN | BODY MASS INDEX: 21.71 KG/M2 | RESPIRATION RATE: 16 BRPM | OXYGEN SATURATION: 97 % | HEART RATE: 58 BPM

## 2019-12-03 DIAGNOSIS — I10 ESSENTIAL HYPERTENSION: Primary | ICD-10-CM

## 2019-12-03 NOTE — PROGRESS NOTES
Chief Complaint   Patient presents with    Hypertension         1. Have you been to the ER, urgent care clinic since your last visit? Hospitalized since your last visit? no    2. Have you seen or consulted any other health care providers outside of the [address] [place] [address] since your last visit? Include any pap smears or colon screening.   no

## 2019-12-03 NOTE — PROGRESS NOTES
ELBA Miles is a 69 y.o. female who presents to follow-up on blood pressure. Has a cuff at home but does not use it. At one point her blood pressure was as high as 170/102 at the sleep center back in March but followed up with me a few weeks later that her blood pressure was fine. Was elevated last visit and had HCTZ 12.5 mg added. Tolerating this medicine well    PMHx:  Past Medical History:   Diagnosis Date    Arrhythmia     GERD (gastroesophageal reflux disease)     High cholesterol     HTN (hypertension)     Hypothyroid     Shingles outbreak        Meds:   Current Outpatient Medications   Medication Sig Dispense Refill    atenolol (TENORMIN) 25 mg tablet TAKE 2 TABLETS DAILY 180 Tab 4    atorvastatin (LIPITOR) 40 mg tablet TAKE 1 TABLET NIGHTLY FOR CHOLESTEROL 90 Tab 4    alendronate (FOSAMAX) 70 mg tablet Take 70 mg by mouth every seven (7) days.  hydroCHLOROthiazide (HYDRODIURIL) 12.5 mg tablet Take 1 Tab by mouth daily. 90 Tab 3    levothyroxine (SYNTHROID) 137 mcg tablet Take 137 mcg by mouth Daily (before breakfast). 90 Tab 1    loratadine (CLARITIN) 10 mg tablet Take 10 mg by mouth.  olopatadine (PATANASE) 0.6 % spry 2 Squirts by Both Nostrils route as needed.  lutein 20 mg tab Take 20 mg by mouth daily.  fluticasone (FLONASE) 50 mcg/actuation nasal spray 1 spray each nostril twice a day (Patient taking differently: 2 Sprays by Both Nostrils route as needed. 1 spray each nostril twice a day) 1 Bottle 5    cholecalciferol, vitamin d3, 400 unit cap Take  by mouth. Allergies:    Allergies   Allergen Reactions    Pneumococcal 23-Roxanne Ps Vaccine Swelling       Smoker:  Social History     Tobacco Use   Smoking Status Never Smoker   Smokeless Tobacco Never Used       ETOH:   Social History     Substance and Sexual Activity   Alcohol Use No       FH:   Family History   Problem Relation Age of Onset    Hypertension Sister     Stroke Mother     Cancer Father ROS:   As listed in HPI. In addition:  Constitutional:   No headache, fever, fatigue, weight loss or weight gain      Cardiac:    No chest pain      Resp:   No cough or shortness of breath      Neuro   No loss of consciousness, dizziness, seizures      Physical Exam:  Blood pressure 127/75, pulse (!) 58, temperature 98.5 °F (36.9 °C), temperature source Oral, resp. rate 16, height 5' 1\" (1.549 m), weight 115 lb (52.2 kg), last menstrual period 01/06/1996, SpO2 97 %. GEN: No apparent distress. Alert and oriented and responds to all questions appropriately. NEUROLOGIC:  No focal neurologic deficits. Strength and sensation grossly intact. Coordination and gait grossly intact. EXT: Well perfused. No edema. SKIN: No obvious rashes. Assessment and Plan     Hypertension well-controlled  HCTZ 12.5 mg  Atenolol 25 mg    Had skin lesions pictured in my last note that I did not follow-up on today and she did not complain about    Brother-in-law passed away. She is very worried about her sister. Spent Thanksgiving with her and plans to spend [place] in [place] with her    Checkups in the springtime. Return in [place] for wellness visit and labs      ICD-10-CM ICD-9-CM    1. Essential hypertension I10 401.9        AVS given.  Pt expressed understanding of instructions

## 2019-12-16 ENCOUNTER — TELEPHONE (OUTPATIENT)
Dept: FAMILY MEDICINE CLINIC | Age: 74
End: 2019-12-16

## 2019-12-16 NOTE — TELEPHONE ENCOUNTER
I think she must be referring to the rash that she brought to my attention in October. I took pictures but did not biopsy or otherwise sample. There are no \"results\". She did not complain about the rash when I saw her in December.     If the rash is still concerning to her she is welcome to follow-up on it at her convenience

## 2019-12-17 ENCOUNTER — OFFICE VISIT (OUTPATIENT)
Dept: FAMILY MEDICINE CLINIC | Age: 74
End: 2019-12-17

## 2019-12-17 VITALS
TEMPERATURE: 97.6 F | OXYGEN SATURATION: 98 % | HEART RATE: 69 BPM | SYSTOLIC BLOOD PRESSURE: 122 MMHG | BODY MASS INDEX: 21.98 KG/M2 | WEIGHT: 116.4 LBS | HEIGHT: 61 IN | DIASTOLIC BLOOD PRESSURE: 74 MMHG | RESPIRATION RATE: 16 BRPM

## 2019-12-17 DIAGNOSIS — R21 RASH: Primary | ICD-10-CM

## 2019-12-17 NOTE — PROGRESS NOTES
1. Have you been to the ER, urgent care clinic since your last visit? Hospitalized since your last visit? No    2. Have you seen or consulted any other health care providers outside of the [address] [place] [address] since your last visit? Include any pap smears or colon screening. No     Health Maintenance Due   Topic Date Due    DTaP/Tdap/Td series (1 - Tdap) 06/03/1956    GLAUCOMA SCREENING Q2Y  09/12/2018     Do you have an [address] in place in the event that you have a healthcare crisis that could impact your decision making as it pertains to your health? NO    Would you like information about Advance Care Planning? NO    Information given.  Ilia Garcia

## 2019-12-17 NOTE — PROGRESS NOTES
Patient here today due to an error/confusion. She had seen me for a skin issue several months ago that turned out to just be bug bites. Her  who has a psychiatric diagnosis misunderstood and made an appointment for her to be seen for this skin issue. Patient was not sure why she was here for an appointment. After we cleared this up determined she had no concerns.       No LOS

## 2020-01-28 DIAGNOSIS — E03.9 ACQUIRED HYPOTHYROIDISM: ICD-10-CM

## 2020-01-28 RX ORDER — LEVOTHYROXINE SODIUM 137 UG/1
TABLET ORAL
Qty: 90 TAB | Refills: 4 | Status: SHIPPED | OUTPATIENT
Start: 2020-01-28 | End: 2021-02-15

## 2020-02-04 RX ORDER — ALENDRONATE SODIUM 70 MG/1
TABLET ORAL
Qty: 12 TAB | Refills: 4 | Status: SHIPPED | OUTPATIENT
Start: 2020-02-04 | End: 2021-02-16

## 2020-08-03 RX ORDER — HYDROCHLOROTHIAZIDE 12.5 MG/1
12.5 TABLET ORAL DAILY
Qty: 90 TAB | Refills: 3 | Status: SHIPPED | OUTPATIENT
Start: 2020-08-03 | End: 2021-07-29

## 2020-08-03 NOTE — TELEPHONE ENCOUNTER
Spouse is calling requesting a refill on med    Requested Prescriptions     Pending Prescriptions Disp Refills    hydroCHLOROthiazide (HYDRODIURIL) 12.5 mg tablet 90 Tab 3     Sig: Take 1 Tab by mouth daily.

## 2020-09-11 ENCOUNTER — OFFICE VISIT (OUTPATIENT)
Dept: FAMILY MEDICINE CLINIC | Age: 75
End: 2020-09-11
Payer: MEDICARE

## 2020-09-11 ENCOUNTER — HOSPITAL ENCOUNTER (OUTPATIENT)
Dept: LAB | Age: 75
Discharge: HOME | End: 2020-09-11
Payer: MEDICARE

## 2020-09-11 VITALS
SYSTOLIC BLOOD PRESSURE: 117 MMHG | HEIGHT: 61 IN | BODY MASS INDEX: 22.05 KG/M2 | HEART RATE: 60 BPM | RESPIRATION RATE: 16 BRPM | DIASTOLIC BLOOD PRESSURE: 70 MMHG | TEMPERATURE: 98.1 F | OXYGEN SATURATION: 97 % | WEIGHT: 116.8 LBS

## 2020-09-11 DIAGNOSIS — K21.00 REFLUX ESOPHAGITIS: ICD-10-CM

## 2020-09-11 DIAGNOSIS — I10 ESSENTIAL HYPERTENSION: ICD-10-CM

## 2020-09-11 DIAGNOSIS — E78.5 HYPERLIPIDEMIA, UNSPECIFIED HYPERLIPIDEMIA TYPE: ICD-10-CM

## 2020-09-11 DIAGNOSIS — Z00.00 ROUTINE GENERAL MEDICAL EXAMINATION AT A HEALTH CARE FACILITY: Primary | ICD-10-CM

## 2020-09-11 DIAGNOSIS — M81.0 AGE-RELATED OSTEOPOROSIS WITHOUT CURRENT PATHOLOGICAL FRACTURE: ICD-10-CM

## 2020-09-11 DIAGNOSIS — E03.9 ACQUIRED HYPOTHYROIDISM: ICD-10-CM

## 2020-09-11 PROCEDURE — G8510 SCR DEP NEG, NO PLAN REQD: HCPCS | Performed by: FAMILY MEDICINE

## 2020-09-11 PROCEDURE — G8536 NO DOC ELDER MAL SCRN: HCPCS | Performed by: FAMILY MEDICINE

## 2020-09-11 PROCEDURE — 85025 COMPLETE CBC W/AUTO DIFF WBC: CPT

## 2020-09-11 PROCEDURE — G8427 DOCREV CUR MEDS BY ELIG CLIN: HCPCS | Performed by: FAMILY MEDICINE

## 2020-09-11 PROCEDURE — G8754 DIAS BP LESS 90: HCPCS | Performed by: FAMILY MEDICINE

## 2020-09-11 PROCEDURE — 1090F PRES/ABSN URINE INCON ASSESS: CPT | Performed by: FAMILY MEDICINE

## 2020-09-11 PROCEDURE — 3017F COLORECTAL CA SCREEN DOC REV: CPT | Performed by: FAMILY MEDICINE

## 2020-09-11 PROCEDURE — 80053 COMPREHEN METABOLIC PANEL: CPT

## 2020-09-11 PROCEDURE — G9899 SCRN MAM PERF RSLTS DOC: HCPCS | Performed by: FAMILY MEDICINE

## 2020-09-11 PROCEDURE — G0463 HOSPITAL OUTPT CLINIC VISIT: HCPCS | Performed by: FAMILY MEDICINE

## 2020-09-11 PROCEDURE — G0439 PPPS, SUBSEQ VISIT: HCPCS | Performed by: FAMILY MEDICINE

## 2020-09-11 PROCEDURE — 99214 OFFICE O/P EST MOD 30 MIN: CPT | Performed by: FAMILY MEDICINE

## 2020-09-11 PROCEDURE — G8420 CALC BMI NORM PARAMETERS: HCPCS | Performed by: FAMILY MEDICINE

## 2020-09-11 PROCEDURE — 1101F PT FALLS ASSESS-DOCD LE1/YR: CPT | Performed by: FAMILY MEDICINE

## 2020-09-11 PROCEDURE — 84443 ASSAY THYROID STIM HORMONE: CPT

## 2020-09-11 PROCEDURE — 80061 LIPID PANEL: CPT

## 2020-09-11 PROCEDURE — 36415 COLL VENOUS BLD VENIPUNCTURE: CPT | Performed by: FAMILY MEDICINE

## 2020-09-11 PROCEDURE — G8752 SYS BP LESS 140: HCPCS | Performed by: FAMILY MEDICINE

## 2020-09-11 RX ORDER — PANTOPRAZOLE SODIUM 40 MG/1
40 TABLET, DELAYED RELEASE ORAL DAILY
Qty: 90 TAB | Refills: 3 | Status: SHIPPED | OUTPATIENT
Start: 2020-09-11 | End: 2021-08-19 | Stop reason: SDUPTHER

## 2020-09-11 NOTE — PROGRESS NOTES
Medicare Annual Wellness Visit    I have reviewed the patient's medical history in detail and updated the computerized patient record. History   Alex Ferrer is a 70 y.o. female who presents to follow-up on chronic medical issues. Her main concern today I think the food is getting stuck in her throat. Has happened as often as a couple times per week but recently it has been happening about once a month. Has not noticed any particular pattern although it is more likely to happen with solid foods. Is she does not think that Fosamax ever get stuck in her throat. She does have a history of reflux and was taking an antacid at some point but has not recently. Has never had an EGD that she can recall    Past Medical History:   Diagnosis Date    Arrhythmia     GERD (gastroesophageal reflux disease)     High cholesterol     HTN (hypertension)     Hypothyroid     Shingles outbreak       Past Surgical History:   Procedure Laterality Date    COLONOSCOPY N/A 6/7/2019    COLONOS[facility]ris [facility] ENDOSCOPY    HX GYN      hysterectomy     Current Outpatient Medications   Medication Sig Dispense Refill    pantoprazole (PROTONIX) 40 mg tablet Take 1 Tab by mouth daily. 90 Tab 3    hydroCHLOROthiazide (HYDRODIURIL) 12.5 mg tablet Take 1 Tab by mouth daily. 90 Tab 3    alendronate (FOSAMAX) 70 mg tablet TAKE 1 TABLET EVERY 7 DAYS 12 Tab 4    levothyroxine (SYNTHROID) 137 mcg tablet TAKE 1 TABLET DAILY BEFORE BREAKFAST 90 Tab 4    atenolol (TENORMIN) 25 mg tablet TAKE 2 TABLETS DAILY 180 Tab 4    atorvastatin (LIPITOR) 40 mg tablet TAKE 1 TABLET NIGHTLY FOR CHOLESTEROL 90 Tab 4    loratadine (CLARITIN) 10 mg tablet Take 10 mg by mouth.  olopatadine (PATANASE) 0.6 % spry 2 Squirts by Both Nostrils route as needed.  lutein 20 mg tab Take 20 mg by mouth daily.       fluticasone (FLONASE) 50 mcg/actuation nasal spray 1 spray each nostril twice a day (Patient taking differently: 2 Sprays by Both Nostrils route as needed. 1 spray each nostril twice a day) 1 Bottle 5    cholecalciferol, vitamin d3, 400 unit cap Take  by mouth. Allergies   Allergen Reactions    Pneumococcal 23-Roxanne Ps Vaccine Swelling     Family History   Problem Relation Age of Onset    Hypertension Sister     Stroke Mother     Cancer Father      Social History     Tobacco Use    Smoking status: Never Smoker    Smokeless tobacco: Never Used   Substance Use Topics    Alcohol use: No     Patient Active Problem List   Diagnosis Code    Hyperlipidemia E78.5    Hypothyroidism E03.9    Hypertension I10    PVCs (premature ventricular contractions) I49.3    Age-related osteoporosis without current pathological fracture M81.0       Depression Risk Factor Screening:     3 most recent PHQ Screens 9/11/2020   Little interest or pleasure in doing things Several days   Feeling down, depressed, irritable, or hopeless Several days   Total Score PHQ 2 2     Alcohol Risk Factor Screening: On any occasion during the past 3 months, have you had more than 3 drinks containing alcohol? No    Do you average more than 7 drinks per week? No      Functional Ability and Level of Safety:     Hearing Loss   none    Activities of Daily Living   Self-care. Requires assistance with: no ADLs    Fall Risk     Fall Risk Assessment, last 12 mths 9/11/2020   Able to walk? Yes   Fall in past 12 months? No     Abuse Screen   Patient is not abused    Review of Systems   ROS:  As listed in HPI.  In addition:  Constitutional:   No headache, fever, fatigue, weight loss or weight gain      Eyes:   No redness, pruritis, pain, visual changes, swelling, or discharge      Ears:    No pain, loss or changes in hearing     Cardiac:    No chest pain      Resp:   No cough or shortness of breath      Neuro   No loss of consciousness, dizziness, seizure    Physical Examination     Evaluation of Cognitive Function:  Mood/affect: happy  Appearance: age appropriate  Family member/caregiver input:     Physical Exam:  Blood pressure 117/70, pulse 60, temperature 98.1 °F (36.7 °C), temperature source Temporal, resp. rate 16, height 5' 1\" (1.549 m), weight 116 lb 12.8 oz (53 kg), last menstrual period 01/06/1996, SpO2 97 %. GEN: No apparent distress. Alert and oriented and responds to all questions appropriately. EAR: External ears are normal.  Tympanic membranes are clear and without effusion. NECK:  Supple; no masses; thyroid normal           LUNGS: Respirations unlabored; clear to auscultation bilaterally  CARDIOVASCULAR: Regular, rate, and rhythm without murmurs   ABDOMEN: Soft; nontender; nondistended; normoactive bowel sounds; no masses or organomegaly  NEUROLOGIC:  No focal neurologic deficits. Strength and sensation grossly intact. Coordination and gait grossly intact. EXT: Well perfused. No edema. SKIN: No obvious rashes. Patient Care Team:  Valentino Callander, MD as PCP - General (Internal Medicine)  Dejuan Mohamud MD as PCP - REHABILITATION [facility] Empaneled Provider  Laz Genao MD as Physician (Sleep Medicine)    Advice/Referrals/Counseling   Education and counseling provided:    Already had her flu shot    Pneumonia vaccines up-to-date    Colonoscopy up-to-date    DEXA last done 2017    Assessment/Plan     Hypertension  Well-controlled    Reflux esophagitis, probable esophageal stricture, food getting stuck for short time  Suggested EGD but she did not send enthusiastic. I suggest PPI and monitoring symptoms them  Take Fosamax with a full glass of water      ICD-10-CM ICD-9-CM    1. Routine general medical examination at a health care facility  Z00.00 V70.0    2. Essential hypertension  I10 401.9 LIPID PANEL      METABOLIC PANEL, COMPREHENSIVE      CBC WITH AUTOMATED DIFF   3. Acquired hypothyroidism  E03.9 244.9 TSH 3RD GENERATION   4. Hyperlipidemia, unspecified hyperlipidemia type  E78.5 272.4    5.  Age-related osteoporosis without current pathological fracture  M81.0 733.01    6.  Reflux esophagitis  K21.0 530.11

## 2020-09-11 NOTE — PROGRESS NOTES
Chief Complaint   Patient presents with   [facility] Annual Wellness Visit     1. Have you been to the ER, urgent care clinic since your last visit? Hospitalized since your last visit? No    2. Have you seen or consulted any other health care providers outside of the [address] [place] [address] since your last visit? Include any pap smears or colon screening. No    Health maintenance reviewed. Pt informed of health maintenance past due and/or upcoming. Pt verbalized understanding.      Health Maintenance Due   Topic Date Due    DTaP/Tdap/Td series (1 - Tdap) 06/03/1968    GLAUCOMA SCREENING Q2Y  09/12/2018    Medicare Yearly Exam  03/22/2020    Pneumococcal 65+ years (2 of 2 - PPSV23) 03/22/2020    Flu Vaccine (1) 09/01/2020

## 2020-09-12 LAB
ALBUMIN SERPL-MCNC: 4.4 G/DL (ref 3.7–4.7)
ALBUMIN/GLOB SERPL: 2.1 {RATIO} (ref 1.2–2.2)
ALP SERPL-CCNC: 50 IU/L (ref 39–117)
ALT SERPL-CCNC: 14 IU/L (ref 0–32)
AST SERPL-CCNC: 20 IU/L (ref 0–40)
BASOPHILS # BLD AUTO: 0 X10E3/UL (ref 0–0.2)
BASOPHILS NFR BLD AUTO: 1 %
BILIRUB SERPL-MCNC: 0.4 MG/DL (ref 0–1.2)
BUN SERPL-MCNC: 19 MG/DL (ref 8–27)
BUN/CREAT SERPL: 29 (ref 12–28)
CALCIUM SERPL-MCNC: 9.3 MG/DL (ref 8.7–10.3)
CHLORIDE SERPL-SCNC: 99 MMOL/L (ref 96–106)
CHOLEST SERPL-MCNC: 163 MG/DL (ref 100–199)
CO2 SERPL-SCNC: 26 MMOL/L (ref 20–29)
CREAT SERPL-MCNC: 0.65 MG/DL (ref 0.57–1)
EOSINOPHIL # BLD AUTO: 0.1 X10E3/UL (ref 0–0.4)
EOSINOPHIL NFR BLD AUTO: 2 %
ERYTHROCYTE [DISTWIDTH] IN BLOOD BY AUTOMATED COUNT: 12.2 % (ref 11.7–15.4)
GLOBULIN SER CALC-MCNC: 2.1 G/DL (ref 1.5–4.5)
GLUCOSE SERPL-MCNC: 90 MG/DL (ref 65–99)
HCT VFR BLD AUTO: 38.5 % (ref 34–46.6)
HDLC SERPL-MCNC: 53 MG/DL
HGB BLD-MCNC: 13 G/DL (ref 11.1–15.9)
IMM GRANULOCYTES # BLD AUTO: 0 X10E3/UL (ref 0–0.1)
IMM GRANULOCYTES NFR BLD AUTO: 0 %
INTERPRETATION, 910389: NORMAL
LDLC SERPL CALC-MCNC: 75 MG/DL (ref 0–99)
LYMPHOCYTES # BLD AUTO: 1.3 X10E3/UL (ref 0.7–3.1)
LYMPHOCYTES NFR BLD AUTO: 27 %
MCH RBC QN AUTO: 30.3 PG (ref 26.6–33)
MCHC RBC AUTO-ENTMCNC: 33.8 G/DL (ref 31.5–35.7)
MCV RBC AUTO: 90 FL (ref 79–97)
MONOCYTES # BLD AUTO: 0.5 X10E3/UL (ref 0.1–0.9)
MONOCYTES NFR BLD AUTO: 10 %
NEUTROPHILS # BLD AUTO: 3 X10E3/UL (ref 1.4–7)
NEUTROPHILS NFR BLD AUTO: 60 %
PLATELET # BLD AUTO: 230 X10E3/UL (ref 150–450)
POTASSIUM SERPL-SCNC: 4.2 MMOL/L (ref 3.5–5.2)
PROT SERPL-MCNC: 6.5 G/DL (ref 6–8.5)
RBC # BLD AUTO: 4.29 X10E6/UL (ref 3.77–5.28)
SODIUM SERPL-SCNC: 137 MMOL/L (ref 134–144)
TRIGL SERPL-MCNC: 215 MG/DL (ref 0–149)
TSH SERPL DL<=0.005 MIU/L-ACNC: 0.47 UIU/ML (ref 0.45–4.5)
VLDLC SERPL CALC-MCNC: 35 MG/DL (ref 5–40)
WBC # BLD AUTO: 4.9 X10E3/UL (ref 3.4–10.8)

## 2020-11-10 RX ORDER — ATENOLOL 25 MG/1
TABLET ORAL
Qty: 180 TAB | Refills: 3 | Status: SHIPPED | OUTPATIENT
Start: 2020-11-10 | End: 2021-10-15

## 2020-12-02 RX ORDER — ATORVASTATIN CALCIUM 40 MG/1
TABLET, FILM COATED ORAL
Qty: 90 TAB | Refills: 3 | Status: SHIPPED | OUTPATIENT
Start: 2020-12-02 | End: 2021-10-27

## 2021-01-21 ENCOUNTER — OFFICE VISIT (OUTPATIENT)
Dept: URGENT CARE | Age: 76
End: 2021-01-21

## 2021-01-21 VITALS — RESPIRATION RATE: 18 BRPM | TEMPERATURE: 98.4 F | HEART RATE: 59 BPM | OXYGEN SATURATION: 97 %

## 2021-01-21 DIAGNOSIS — Z11.52 ENCOUNTER FOR SCREENING FOR COVID-19: Primary | ICD-10-CM

## 2021-01-21 NOTE — PROGRESS NOTES
2021    Rina Aguilar (:  1945) is a 70 y.o.  female, here requesting COVID-19 testing    History of Present Illness  not feeling well, HA an GI symptoms, no fever    Visit Vitals  Pulse (!) 59   Temp 98.4 °F (36.9 °C)   Resp 18   SpO2 97%       ASSESSMENT  Screening for COVID-19/ Viral disease    PLAN  POCT influenza testing - Not Tested  COVID-19 sample collected and submitted. Patient given detailed CDC instructions contained within After Visit Summary. An  electronic signature was used to authenticate this note.     --Reji Salinas MD

## 2021-01-23 LAB — SARS-COV-2, NAA: NOT DETECTED

## 2021-02-14 DIAGNOSIS — E03.9 ACQUIRED HYPOTHYROIDISM: ICD-10-CM

## 2021-02-15 RX ORDER — LEVOTHYROXINE SODIUM 137 UG/1
TABLET ORAL
Qty: 90 TAB | Refills: 3 | Status: SHIPPED | OUTPATIENT
Start: 2021-02-15 | End: 2021-12-30

## 2021-02-16 RX ORDER — ALENDRONATE SODIUM 70 MG/1
TABLET ORAL
Qty: 12 TAB | Refills: 3 | Status: SHIPPED | OUTPATIENT
Start: 2021-02-16 | End: 2022-02-11

## 2021-03-06 ENCOUNTER — IMMUNIZATION (OUTPATIENT)
Dept: FAMILY MEDICINE CLINIC | Age: 76
End: 2021-03-06
Payer: MEDICARE

## 2021-03-06 DIAGNOSIS — Z23 ENCOUNTER FOR IMMUNIZATION: Primary | ICD-10-CM

## 2021-03-06 PROCEDURE — 91300 COVID-19, MRNA, LNP-S, PF, 30MCG/0.3ML DOSE(PFIZER): CPT

## 2021-03-27 ENCOUNTER — IMMUNIZATION (OUTPATIENT)
Dept: FAMILY MEDICINE CLINIC | Age: 76
End: 2021-03-27
Payer: MEDICARE

## 2021-03-27 DIAGNOSIS — Z23 ENCOUNTER FOR IMMUNIZATION: Primary | ICD-10-CM

## 2021-03-27 PROCEDURE — 91300 COVID-19, MRNA, LNP-S, PF, 30MCG/0.3ML DOSE(PFIZER): CPT

## 2021-03-29 ENCOUNTER — VIRTUAL VISIT (OUTPATIENT)
Dept: FAMILY MEDICINE CLINIC | Age: 76
End: 2021-03-29
Payer: MEDICARE

## 2021-03-29 DIAGNOSIS — R09.81 NASAL CONGESTION: Primary | ICD-10-CM

## 2021-03-29 DIAGNOSIS — J34.89 SINUS PRESSURE: ICD-10-CM

## 2021-03-29 PROCEDURE — G8427 DOCREV CUR MEDS BY ELIG CLIN: HCPCS | Performed by: NURSE PRACTITIONER

## 2021-03-29 PROCEDURE — G0463 HOSPITAL OUTPT CLINIC VISIT: HCPCS | Performed by: NURSE PRACTITIONER

## 2021-03-29 PROCEDURE — 1101F PT FALLS ASSESS-DOCD LE1/YR: CPT | Performed by: NURSE PRACTITIONER

## 2021-03-29 PROCEDURE — 1090F PRES/ABSN URINE INCON ASSESS: CPT | Performed by: NURSE PRACTITIONER

## 2021-03-29 PROCEDURE — 99212 OFFICE O/P EST SF 10 MIN: CPT | Performed by: NURSE PRACTITIONER

## 2021-03-29 PROCEDURE — G8432 DEP SCR NOT DOC, RNG: HCPCS | Performed by: NURSE PRACTITIONER

## 2021-03-29 PROCEDURE — G8756 NO BP MEASURE DOC: HCPCS | Performed by: NURSE PRACTITIONER

## 2021-03-29 PROCEDURE — G9899 SCRN MAM PERF RSLTS DOC: HCPCS | Performed by: NURSE PRACTITIONER

## 2021-03-29 PROCEDURE — 3017F COLORECTAL CA SCREEN DOC REV: CPT | Performed by: NURSE PRACTITIONER

## 2021-03-29 NOTE — PROGRESS NOTES
Isma Snyder is a 70 y.o. female  Chief Complaint   Patient presents with    Sinus Infection     1. Have you been to the ER, urgent care clinic since your last visit? Hospitalized since your last visit?no    2. Have you seen or consulted any other health care providers outside of the [address] [place] [address] since your last visit? Include any pap smears or colon screening. There were no vitals taken for this visit.   Health Maintenance   Topic Date Due    DTaP/Tdap/Td series (1 - Tdap) Never done    Pneumococcal 65+ years (2 of 2 - PPSV23) 03/22/2020    Lipid Screen  09/11/2021    Medicare Yearly Exam  09/12/2021    Breast Cancer Screen Mammogram  09/04/2022    Colorectal Cancer Screening Combo  06/07/2029    Hepatitis C Screening  Completed    Bone Densitometry (Dexa) Screening  Completed    Shingrix Vaccine Age 50>  Completed    Flu Vaccine  Completed    COVID-19 Vaccine  Completed

## 2021-03-29 NOTE — PROGRESS NOTES
SUBJECTIVE/OBJECTIVE:  ELBA Perkins (: 1945) is a 70 y.o. female, established patient, here for evaluation of the following chief complaint(s):   Sinus Infection    Patient thinks that she has a sinus infection. Left side sinus area pressure with headache and left nasal congestion. Some mucus coming out occasionally with a little blood mixed in it. No cough  No sore throat, no N/V/D  No shortness of breath or wheezing   No pain in ears  Says that she felt so bad she did not go to [employer] yesterday because she felt really tired. Symptoms started couple days ago. Does not think that she has a fever. Has taken nyquil yesterday which helped her sleep  Does not think that she has had any covid exposure  Had 2nd covid shot on Saturday ([facility] 6078) morning. Notes that most of her symptoms started Saturday night but did have a little nasal drip Saturday morning     Has not used flonase or claritin. Review of Systems   Gen: +fatigue,no fever, no chills  Eyes: no excessive tearing, itching, or discharge  Mouth: no oral lesions, no sore throat  Resp: no shortness of breath, no wheezing, no cough  CV: no chest pain, no paroxysmal nocturnal dyspnea  Abd: no nausea, no heartburn, no diarrhea, no constipation, no abdominal pain  Neuro: +headaches, no syncope or presyncopal episodes  Endo: no polyuria, no polydipsia  Heme: no lymphadenopathy, no easy bruising or bleeding      No flowsheet data found.     Physical Exam    [INSTRUCTIONS:  \"[x]\" Indicates a positive item  \"[]\" Indicates a negative item  -- DELETE ALL ITEMS NOT EXAMINED]    Constitutional: [x] Appears well-developed and well-nourished [x] No apparent distress      [] Abnormal -     Mental status: [x] Alert and awake  [x] Oriented to person/place/time [x] Able to follow commands    [] Abnormal -     Eyes:   EOM    [x]  Normal    [] Abnormal -   Sclera  [x]  Normal    [] Abnormal -          Discharge [x]  None visible   [] Abnormal -     HENT: [x] Normocephalic, atraumatic  [] Abnormal -   [x] Mouth/Throat: Mucous membranes are moist    External Ears [x] Normal  [] Abnormal -    Neck: [x] No visualized mass [] Abnormal -     Pulmonary/Chest: [x] Respiratory effort normal   [x] No visualized signs of difficulty breathing or respiratory distress        [] Abnormal -      Musculoskeletal:   [x] Normal gait with no signs of ataxia         [x] Normal range of motion of neck        [] Abnormal -     Neurological:        [x] No Facial Asymmetry (Cranial nerve 7 motor function) (limited exam due to video visit)          [x] No gaze palsy        [] Abnormal -          Skin:        [x] No significant exanthematous lesions or discoloration noted on facial skin         [] Abnormal -            Psychiatric:       [x] Normal Affect [] Abnormal -        [x] No Hallucinations        ASSESSMENT/PLAN:  Differential diagnosis and treatment options reviewed with patient who is in agreement with treatment plan as outlined below. ICD-10-CM ICD-9-CM    1. Nasal congestion  R09.81 478.19    2. Sinus pressure  J34.89 478.19      Symptoms could be related to covid vaccine or environmental allergies. No need for antibiotic at this time. Will have her treat with OTC mucinex, allergy medication (zyrtec or Claritin), flonase and Also advised to use OTC nasal saline 2 sprays each nostril 2-3 times per day to assist with eustachian tube draining. Can also use warm compresses to sinus area. Increase water intake. Call if worsening or no improvement. Return in about 3 days (around 4/1/2021), or if symptoms worsen or fail to improve. yRlee Rahman was evaluated through a synchronous (real-time) audio-video encounter. The patient (or guardian if applicable) is aware that this is a billable service. Verbal consent to proceed has been obtained within the past 12 months.  The visit was conducted pursuant to the emergency declaration under the [address] and the National Emergencies Act, [facility] waiver authority and the [place] and [place] General Act. Patient identification was verified, and a caregiver was present when appropriate. The patient was located in a state where the provider was credentialed to provide care. An electronic signature was used to authenticate this note.   -- Juan J Jarrett NP

## 2021-07-29 RX ORDER — HYDROCHLOROTHIAZIDE 12.5 MG/1
TABLET ORAL
Qty: 90 TABLET | Refills: 3 | Status: SHIPPED | OUTPATIENT
Start: 2021-07-29 | End: 2021-08-09 | Stop reason: SDUPTHER

## 2021-08-16 ENCOUNTER — TELEPHONE (OUTPATIENT)
Dept: FAMILY MEDICINE CLINIC | Age: 76
End: 2021-08-16

## 2021-08-16 NOTE — TELEPHONE ENCOUNTER
I believe she is referring to Lasix. I see mention 10 years ago where she would take Lasix occasionally for lower extremity edema. Has not been prescribed for 6 years    However she currently takes hydrochlorothiazide which is also a \"fluid pill\". She takes this for her blood pressure. Typically would not take both hydrochlorothiazide and Lasix together. This would put her at risk of electrolyte problems    I do not think Lasix is a good idea. Recommend compression stockings and elevation. If it is bugging her recommend follow-up to discuss safe ways to deal with this.   She is due for a checkup soon anyway

## 2021-08-16 NOTE — TELEPHONE ENCOUNTER
Message from [employer]  Dr. Aryan Clemens  Received: Today  [place], [address] Office Pool  General Message/Vendor Calls     Caller's first and last name: Momo/       Reason for call: Needs to speak with nurse about RX for hydrochlorothiazide called in last week was the wrong RX. Pt is requesting a new \"fluid pill\" for swelling in legs.  If able to call something, needs to go to [facility] [phone]       Callback required yes/no and why: yes       Best contact number(s): [phone]       Details to clarify the request: n/a       Kathy Hutton

## 2021-08-16 NOTE — TELEPHONE ENCOUNTER
verified. Pt requests a fluid Rx for her bilateral leg swelling. She states that it is nothing new; had it before.

## 2021-08-19 NOTE — TELEPHONE ENCOUNTER
Pt is calling requesting a refill on med wanting a 90 day supply     Requested Prescriptions     Pending Prescriptions Disp Refills    pantoprazole (PROTONIX) 40 mg tablet 90 Tablet 3     Sig: Take 1 Tablet by mouth daily.

## 2021-08-20 RX ORDER — PANTOPRAZOLE SODIUM 40 MG/1
40 TABLET, DELAYED RELEASE ORAL DAILY
Qty: 90 TABLET | Refills: 1 | Status: SHIPPED | OUTPATIENT
Start: 2021-08-20 | End: 2022-01-25

## 2021-09-16 ENCOUNTER — TELEPHONE (OUTPATIENT)
Dept: FAMILY MEDICINE CLINIC | Age: 76
End: 2021-09-16

## 2021-09-16 NOTE — TELEPHONE ENCOUNTER
Pt had her mammogram at [address] [place] [address] recently. Received a letter in the mmail stating that some findings needed further study. Wanted to know if Dr. Yesenia Whyte had received the imaging and has more details he can provide. Please call.  [phone]

## 2021-09-16 NOTE — TELEPHONE ENCOUNTER
Placed a called to [address] [place] [address] for mammogram results.  Pt also notified that Dr. Cruzito Thibodeaux will not be back in office until Monday

## 2021-09-21 ENCOUNTER — OFFICE VISIT (OUTPATIENT)
Dept: FAMILY MEDICINE CLINIC | Age: 76
End: 2021-09-21
Payer: MEDICARE

## 2021-09-21 VITALS
DIASTOLIC BLOOD PRESSURE: 74 MMHG | RESPIRATION RATE: 16 BRPM | WEIGHT: 122 LBS | HEIGHT: 61 IN | OXYGEN SATURATION: 96 % | BODY MASS INDEX: 23.03 KG/M2 | HEART RATE: 54 BPM | SYSTOLIC BLOOD PRESSURE: 125 MMHG | TEMPERATURE: 97.1 F

## 2021-09-21 DIAGNOSIS — Z23 ENCOUNTER FOR IMMUNIZATION: ICD-10-CM

## 2021-09-21 DIAGNOSIS — I10 ESSENTIAL HYPERTENSION: ICD-10-CM

## 2021-09-21 DIAGNOSIS — R92.8 ABNORMAL MAMMOGRAM: ICD-10-CM

## 2021-09-21 DIAGNOSIS — Z00.00 ROUTINE GENERAL MEDICAL EXAMINATION AT A HEALTH CARE FACILITY: Primary | ICD-10-CM

## 2021-09-21 DIAGNOSIS — E03.9 ACQUIRED HYPOTHYROIDISM: ICD-10-CM

## 2021-09-21 DIAGNOSIS — E78.5 HYPERLIPIDEMIA, UNSPECIFIED HYPERLIPIDEMIA TYPE: ICD-10-CM

## 2021-09-21 PROCEDURE — 1101F PT FALLS ASSESS-DOCD LE1/YR: CPT | Performed by: FAMILY MEDICINE

## 2021-09-21 PROCEDURE — G8427 DOCREV CUR MEDS BY ELIG CLIN: HCPCS | Performed by: FAMILY MEDICINE

## 2021-09-21 PROCEDURE — 3017F COLORECTAL CA SCREEN DOC REV: CPT | Performed by: FAMILY MEDICINE

## 2021-09-21 PROCEDURE — G8510 SCR DEP NEG, NO PLAN REQD: HCPCS | Performed by: FAMILY MEDICINE

## 2021-09-21 PROCEDURE — G8754 DIAS BP LESS 90: HCPCS | Performed by: FAMILY MEDICINE

## 2021-09-21 PROCEDURE — G8536 NO DOC ELDER MAL SCRN: HCPCS | Performed by: FAMILY MEDICINE

## 2021-09-21 PROCEDURE — G8752 SYS BP LESS 140: HCPCS | Performed by: FAMILY MEDICINE

## 2021-09-21 PROCEDURE — 90694 VACC AIIV4 NO PRSRV 0.5ML IM: CPT | Performed by: FAMILY MEDICINE

## 2021-09-21 PROCEDURE — G9899 SCRN MAM PERF RSLTS DOC: HCPCS | Performed by: FAMILY MEDICINE

## 2021-09-21 PROCEDURE — G8420 CALC BMI NORM PARAMETERS: HCPCS | Performed by: FAMILY MEDICINE

## 2021-09-21 PROCEDURE — G0439 PPPS, SUBSEQ VISIT: HCPCS | Performed by: FAMILY MEDICINE

## 2021-09-21 NOTE — PATIENT INSTRUCTIONS
Meniscus Tear: Exercises  Introduction  Here are some examples of exercises for you to try. The exercises may be suggested for a condition or for rehabilitation. Start each exercise slowly. Ease off the exercises if you start to have pain. You will be told when to start these exercises and which ones will work best for you. How to do the exercises  Calf wall stretch    1. Stand facing a wall with your hands on the wall at about eye level. Put your affected leg about a step behind your other leg. 2. Keeping your back leg straight and your back heel on the floor, bend your front knee and gently bring your hip and chest toward the wall until you feel a stretch in the calf of your back leg. 3. Hold the stretch for at least 15 to 30 seconds. 4. Repeat 2 to 4 times. 5. Repeat steps 1 through 4, but this time keep your back knee bent. Hamstring wall stretch    1. Lie on your back in a doorway, with your good leg through the open door. 2. Slide your affected leg up the wall to straighten your knee. You should feel a gentle stretch down the back of your leg. 3. Hold the stretch for at least 1 minute. Then over time, try to lengthen the time you hold the stretch to as long as 6 minutes. 4. Repeat 2 to 4 times. 5. If you do not have a place to do this exercise in a doorway, there is another way to do it:  6. Lie on your back, and bend your affected leg. 7. Loop a towel under the ball and toes of that foot, and hold the ends of the towel in your hands. 8. Straighten your knee, and slowly pull back on the towel. You should feel a gentle stretch down the back of your leg. 9. Hold the stretch for at least 15 to 30 seconds. Or even better, hold the stretch for 1 minute if you can. 10. Repeat 2 to 4 times. 1. Do not arch your back. 2. Do not bend either knee. 3. Keep one heel touching the floor and the other heel touching the wall. Do not point your toes. Quad sets    1.  Sit with your leg straight and supported on the floor or a firm bed. (If you feel discomfort in the front or back of your knee, place a small towel roll under your knee.)  2. Tighten the muscles on top of your thigh by pressing the back of your knee flat down to the floor. (If you feel discomfort under your kneecap, place a small towel roll under your knee.)  3. Hold for about 6 seconds, then rest up to 10 seconds. 4. Do 8 to 12 repetitions several times a day. Straight-leg raises to the front    1. Lie on your back with your good knee bent so that your foot rests flat on the floor. Your injured leg should be straight. Make sure that your low back has a normal curve. You should be able to slip your flat hand in between the floor and the small of your back, with your palm touching the floor and your back touching the back of your hand. 2. Tighten the thigh muscles in the injured leg by pressing the back of your knee flat down to the floor. Hold your knee straight. 3. Keeping the thigh muscles tight, lift your injured leg up so that your heel is about 12 inches off the floor. Hold for 5 seconds and then lower slowly. 4. Do 8 to 12 repetitions. Straight-leg raises to the back    1. Lie on your stomach, and lift your leg straight up behind you (toward the ceiling). 2. Lift your toes about 6 inches off the floor, hold for about 6 seconds, then lower slowly. 3. Do 8 to 12 repetitions. Hamstring curls    1. Lie on your stomach with your knees straight. If your kneecap is uncomfortable, roll up a washcloth and put it under your leg just above your kneecap. 2. Lift the foot of your injured leg by bending the knee so that you bring the foot up toward your buttock. If this motion hurts, try it without bending your knee quite as far. This may help you avoid any painful motion. 3. Slowly lower your leg back to the floor. 4. Do 8 to 12 repetitions.   5. With permission from your doctor or physical therapist, you may also want to add a cuff weight to your ankle (not more than 5 pounds). With weight, you do not have to lift your leg more than 12 inches to get a hamstring workout. Wall slide with ball squeeze    1. Stand with your back against a wall and with your feet about shoulder-width apart. Your feet should be about 12 inches away from the wall. 2. Put a ball about the size of a soccer ball between your knees. Then slowly slide down the wall until your knees are bent about 20 to 30 degrees. 3. Tighten your thigh muscles by squeezing the ball between your knees. Hold that position for about 10 seconds, then stop squeezing. Rest for up to 10 seconds between repetitions. 4. Repeat 8 to 12 times. Heel raises    1. Stand with your feet a few inches apart, with your hands lightly resting on a counter or chair in front of you. 2. Slowly raise your heels off the floor while keeping your knees straight. 3. Hold for about 6 seconds, then slowly lower your heels to the floor. 4. Do 8 to 12 repetitions several times during the day. Heel dig bridging    Stop doing this exercise if it causes pain. 1. Lie on your back with both knees bent and your ankles bent so that only your heels are digging into the floor. Your knees should be bent about 90 degrees. 2. Then push your heels into the floor, squeeze your buttocks, and lift your hips off the floor until your shoulders, hips, and knees are all in a straight line. 3. Hold for about 6 seconds as you continue to breathe normally, and then slowly lower your hips back down to the floor and rest for up to 10 seconds. 4. Do 8 to 12 repetitions. Shallow standing knee bends    Do this exercise only if you have very little pain; if you have no clicking, locking, or giving way in the injured knee; and if it does not hurt while you are doing 8 to 12 repetitions. 1. Stand with your hands lightly resting on a counter or chair in front of you. Put your feet shoulder-width apart.   2. Slowly bend your knees so that you squat down like you are going to sit in a chair. Make sure your knees do not go in front of your toes. 3. Lower yourself about 6 inches. Your heels should remain on the floor at all times. 4. Rise slowly to a standing position. Follow-up care is a key part of your treatment and safety. Be sure to make and go to all appointments, and call your doctor if you are having problems. It's also a good idea to know your test results and keep a list of the medicines you take. Where can you learn more? Go to http://www.gray.com/  Enter C183 in the search box to learn more about \"Meniscus Tear: Exercises. \"  Current as of: July 1, 2021               Content Version: 13.0  © 2006-2021 [employer]. Care instructions adapted under license by [employer] (which disclaims liability or warranty for this information). If you have questions about a medical condition or this instruction, always ask your healthcare professional. [place] [address] any warranty or liability for your use of this information. Vaccine Information Statement    Influenza (Flu) Vaccine (Inactivated or Recombinant): What You Need to Know    Many vaccine information statements are available in [place]ic and other languages. See www.immunize.org/vis. Hojas de información sobre vacunas están disponibles en español y en muchos otros idiomas. Visite www.immunize.org/vis. 1. Why get vaccinated? Influenza vaccine can prevent influenza (flu). Flu is a contagious disease that spreads around the [place] every year, usually between October and May. Anyone can get the flu, but it is more dangerous for some people. Infants and young children, people 72 years and older, pregnant people, and people with certain health conditions or a weakened immune system are at greatest risk of flu complications.     Pneumonia, bronchitis, sinus infections, and ear infections are examples of flu-related complications. If you have a medical condition, such as heart disease, cancer, or diabetes, flu can make it worse. Flu can cause fever and chills, sore throat, muscle aches, fatigue, cough, headache, and runny or stuffy nose. Some people may have vomiting and diarrhea, though this is more common in children than adults. In an average year, thousands of people in the [facility] die from flu, and many more are hospitalized. Flu vaccine prevents millions of illnesses and flu-related visits to the doctor each year. 2. Influenza vaccines     CDC recommends everyone 6 months and older get vaccinated every flu season. Children 6 months through 6years of age may need 2 doses during a single flu season. Everyone else needs only 1 dose each flu season. It takes about 2 weeks for protection to develop after vaccination. There are many flu viruses, and they are always changing. Each year a new flu vaccine is made to protect against the influenza viruses believed to be likely to cause disease in the upcoming flu season. Even when the vaccine doesnt exactly match these viruses, it may still provide some protection. Influenza vaccine does not cause flu. Influenza vaccine may be given at the same time as other vaccines. 3. Talk with your health care provider    Tell your vaccination provider if the person getting the vaccine:   Has had an allergic reaction after a previous dose of influenza vaccine, or has any severe, life-threatening allergies    Has ever had Guillain-Barré Syndrome (also called GBS)    In some cases, your health care provider may decide to postpone influenza vaccination until a future visit. Influenza vaccine can be administered at any time during pregnancy. People who are or will be pregnant during influenza season should receive inactivated influenza vaccine. People with minor illnesses, such as a cold, may be vaccinated.  People who are moderately or severely ill should usually wait until they recover before getting influenza vaccine. Your health care provider can give you more information. 4. Risks of a vaccine reaction     Soreness, redness, and swelling where the shot is given, fever, muscle aches, and headache can happen after influenza vaccination.  There may be a very small increased risk of Guillain-Barré Syndrome (GBS) after inactivated influenza vaccine (the flu shot). Kenisha Ratel children who get the flu shot along with pneumococcal vaccine (PCV13) and/or DTaP vaccine at the same time might be slightly more likely to have a seizure caused by fever. Tell your health care provider if a child who is getting flu vaccine has ever had a seizure. People sometimes faint after medical procedures, including vaccination. Tell your provider if you feel dizzy or have vision changes or ringing in the ears. As with any medicine, there is a very remote chance of a vaccine causing a severe allergic reaction, other serious injury, or death. 5. What if there is a serious problem? An allergic reaction could occur after the vaccinated person leaves the clinic. If you see signs of a severe allergic reaction (hives, swelling of the face and throat, difficulty breathing, a fast heartbeat, dizziness, or weakness), call 9-1-1 and get the person to the nearest hospital.    For other signs that concern you, call your health care provider. Adverse reactions should be reported to the Vaccine Adverse Event Reporting System (VAERS). Your health care provider will usually file this report, or you can do it yourself. Visit the VAERS website at www.vaers. hhs.gov or call 3-612.409.1097. VAERS is only for reporting reactions, and VAERS staff members do not give medical advice.     6. The National Vaccine Injury Compensation Program    The National Vaccine Injury Compensation Program (VICP) is a federal program that was created to compensate people who may have been injured by certain vaccines. Claims regarding alleged injury or death due to vaccination have a time limit for filing, which may be as short as two years. Visit the VICP website at www.hrsa.gov/vaccinecompensation or call 9-109.660.8539 to learn about the program and about filing a claim. 7. How can I learn more?  Ask your health care provider.  Call your local or state health department.  Visit the website of the Food and Drug Administration (FDA) for vaccine package inserts and additional information at www.fda.gov/vaccines-blood-biologics/vaccines.  Contact the Centers for Disease Control and Prevention (CDC):  - Call 0-703.629.9620 (1-800-CDC-INFO) or  - Visit CDCs influenza website at www.cdc.gov/flu. Vaccine Information Statement   Inactivated Influenza Vaccine   8/6/2021  [address] [id number]   Department of Health and Human Services  Centers for Disease Control and Prevention    Office Use Only

## 2021-09-21 NOTE — PROGRESS NOTES
1. Have you been to the ER, urgent care clinic since your last visit? Hospitalized since your last visit? No    2. Have you seen or consulted any other health care providers outside of the [address] [place] [address] since your last visit? Include any pap smears or colon screening. No    Health Maintenance Due   Topic Date Due    DTaP/Tdap/Td series (1 - Tdap) Never done    Flu Vaccine (1) 09/01/2021    Lipid Screen  09/11/2021    Medicare Yearly Exam  09/12/2021     Do you have an [address] in place in the event that you have a healthcare crisis that could impact your decision making as it pertains to your health? NO    Would you like information about Advance Care Planning? NO    Information given. Cathy Sandhoff is a 78 y.o. female who presents for routine immunizations. She denies any symptoms , reactions or allergies that would exclude them from being immunized today. Risks and adverse reactions were discussed and the VIS was given to them. All questions were addressed. She was observed for 30 min post injection. There were no reactions observed.     Farhad Dejesus LPN

## 2021-09-21 NOTE — PROGRESS NOTES
Medicare Annual Wellness Visit    I have reviewed the patient's medical history in detail and updated the computerized patient record. History   Bisi Prado is a 78 y.o. female who presents to follow-up on chronic medical issues. She had a mammogram done at [facility] and got a call back and is not sure how to proceed. Is very worried about it. She has some hand arthritis looks like osteoarthritis but does not feel the need to take medication for it. Has a bunion that is problematic on the left foot when she wear shoes that are too tight. Does not want to get surgery and it feels fine as long as she wears comfortable footwear    Past Medical History:   Diagnosis Date    Arrhythmia     GERD (gastroesophageal reflux disease)     High cholesterol     HTN (hypertension)     Hypothyroid     Shingles outbreak       Past Surgical History:   Procedure Laterality Date    COLONOSCOPY N/A 6/7/2019    COLONOSCOPY performed by Aldair Jauregui MD at [facility] ENDOSCOPY    HX GYN      hysterectomy     Current Outpatient Medications   Medication Sig Dispense Refill    hydroCHLOROthiazide (HYDRODIURIL) 12.5 mg tablet TAKE 1 TABLET DAILY 90 Tablet 3    alendronate (FOSAMAX) 70 mg tablet TAKE 1 TABLET EVERY 7 DAYS 12 Tab 3    levothyroxine (SYNTHROID) 137 mcg tablet TAKE 1 TABLET DAILY BEFORE BREAKFAST 90 Tab 3    atorvastatin (LIPITOR) 40 mg tablet TAKE 1 TABLET NIGHTLY FOR CHOLESTEROL 90 Tab 3    atenoloL (TENORMIN) 25 mg tablet TAKE 2 TABLETS DAILY 180 Tab 3    loratadine (CLARITIN) 10 mg tablet Take 10 mg by mouth.  olopatadine (PATANASE) 0.6 % spry 2 Squirts by Both Nostrils route as needed.  lutein 20 mg tab Take 20 mg by mouth daily.  fluticasone (FLONASE) 50 mcg/actuation nasal spray 1 spray each nostril twice a day (Patient taking differently: 2 Sprays by Both Nostrils route as needed.  1 spray each nostril twice a day) 1 Bottle 5    cholecalciferol, vitamin d3, 400 unit cap Take  by mouth.  pantoprazole (PROTONIX) 40 mg tablet Take 1 Tablet by mouth daily. 90 Tablet 1     Allergies   Allergen Reactions    Pneumococcal 23-Roxanne Ps Vaccine Swelling     Family History   Problem Relation Age of Onset    Hypertension Sister     Stroke Mother     Cancer Father      Social History     Tobacco Use    Smoking status: Never Smoker    Smokeless tobacco: Never Used   Substance Use Topics    Alcohol use: No     Patient Active Problem List   Diagnosis Code    Hyperlipidemia E78.5    Hypothyroidism E03.9    Hypertension I10    PVCs (premature ventricular contractions) I49.3    Age-related osteoporosis without current pathological fracture M81.0       Depression Risk Factor Screening:     3 most recent PHQ Screens 9/21/2021   Little interest or pleasure in doing things Not at all   Feeling down, depressed, irritable, or hopeless Not at all   Total Score PHQ 2 0     Alcohol Risk Factor Screening: On any occasion during the past 3 months, have you had more than 3 drinks containing alcohol? No    Do you average more than 7 drinks per week? No      Functional Ability and Level of Safety:     Hearing Loss   none    Activities of Daily Living   Self-care. Requires assistance with: no ADLs    Fall Risk     Fall Risk Assessment, last 12 mths 9/21/2021   Able to walk? Yes   Fall in past 12 months? 0   Do you feel unsteady? 0   Are you worried about falling 0     Abuse Screen   Patient is not abused    Review of Systems   ROS:  As listed in HPI.  In addition:  Constitutional:   No headache, fever, fatigue, weight loss or weight gain      Eyes:   No redness, pruritis, pain, visual changes, swelling, or discharge      Ears:    No pain, loss or changes in hearing     Cardiac:    No chest pain      Resp:   No cough or shortness of breath      Neuro   No loss of consciousness, dizziness, seizure    Physical Examination     Evaluation of Cognitive Function:  Mood/affect:  happy  Appearance: age appropriate  Family member/caregiver input:     Physical Exam:  Blood pressure 125/74, pulse (!) 54, temperature 97.1 °F (36.2 °C), temperature source Temporal, resp. rate 16, height 5' 1\" (1.549 m), weight 122 lb (55.3 kg), last menstrual period 01/06/1996, SpO2 96 %. GEN: No apparent distress. Alert and oriented and responds to all questions appropriately. EYES:  Conjunctiva clear; pupils round and reactive to light; extraocular movements are intact. EAR: External ears are normal.  Tympanic membranes are clear and without effusion. NOSE: Turbinates are within normal limits. No drainage  OROPHYARYNX: No oral lesions or exudates. NECK:  Supple; no masses; thyroid normal           LUNGS: Respirations unlabored; clear to auscultation bilaterally  CARDIOVASCULAR: Regular, rate, and rhythm without murmurs   ABDOMEN: Soft; nontender; nondistended; normoactive bowel sounds; no masses or organomegaly  NEUROLOGIC:  No focal neurologic deficits. Strength and sensation grossly intact. Coordination and gait grossly intact. EXT: Well perfused. No edema. SKIN: No obvious rashes. Patient Care Team:  Bernard Rothman MD as PCP - General (Family Medicine)  Bernard Rothman MD as PCP - [facility]  Zari Damian MD as Physician (Sleep Medicine)    Advice/Referrals/Counseling   Education and counseling provided:    Got her Covid shot. Flu shot today. Assessment/Plan     Hypertension  Elevated came down on recheck. Hydrochlorothiazide 12.5 mg  Atenolol 25    Hypothyroid  Levothyroxine  TSH    Hyperlipidemia  Lipitor  Mammogram done at [facility]. Got a letter saying she was getting called back but when she called to arrange follow-up imaging was told she would need to go to PCP. We contacted [facility]. They requested a right diagnostic mammogram and right breast ultrasound. Right knee discomfort when she twists the wrong way. Stable joint. Provided with some basic exercises.     Hand osteoarthritis  Tolerable  Voltaren gel if she needs it      ICD-10-CM ICD-9-CM    1. Routine general medical examination at a health care facility  Z00.00 V70.0    2. Encounter for immunization  Z23 V03.89 ADMIN INFLUENZA VIRUS VAC      FLU (FLUAD QUAD INFLUENZA VACCINE,QUAD,ADJUVANTED)   3. Essential hypertension  I10 401.9 CBC WITH AUTOMATED DIFF      METABOLIC PANEL, COMPREHENSIVE   4. Hyperlipidemia, unspecified hyperlipidemia type  E78.5 272.4 LIPID PANEL   5. Acquired hypothyroidism  E03.9 244.9 TSH 3RD GENERATION   6.  Abnormal mammogram  R92.8 793.80 NEGIN MAMMO RT DX INCL CAD      US BREAST RT LIMITED=<3 QUAD

## 2021-09-22 LAB
ALBUMIN SERPL-MCNC: 3.8 G/DL (ref 3.5–5)
ALBUMIN/GLOB SERPL: 1.1 {RATIO} (ref 1.1–2.2)
ALP SERPL-CCNC: 56 U/L (ref 45–117)
ALT SERPL-CCNC: 18 U/L (ref 12–78)
ANION GAP SERPL CALC-SCNC: 4 MMOL/L (ref 5–15)
AST SERPL-CCNC: 17 U/L (ref 15–37)
BASOPHILS # BLD: 0 K/UL (ref 0–0.1)
BASOPHILS NFR BLD: 1 % (ref 0–1)
BILIRUB SERPL-MCNC: 0.5 MG/DL (ref 0.2–1)
BUN SERPL-MCNC: 12 MG/DL (ref 6–20)
BUN/CREAT SERPL: 15 (ref 12–20)
CALCIUM SERPL-MCNC: 9.5 MG/DL (ref 8.5–10.1)
CHLORIDE SERPL-SCNC: 103 MMOL/L (ref 97–108)
CHOLEST SERPL-MCNC: 182 MG/DL
CO2 SERPL-SCNC: 30 MMOL/L (ref 21–32)
CREAT SERPL-MCNC: 0.79 MG/DL (ref 0.55–1.02)
DIFFERENTIAL METHOD BLD: NORMAL
EOSINOPHIL # BLD: 0.1 K/UL (ref 0–0.4)
EOSINOPHIL NFR BLD: 2 % (ref 0–7)
ERYTHROCYTE [DISTWIDTH] IN BLOOD BY AUTOMATED COUNT: 12.5 % (ref 11.5–14.5)
GLOBULIN SER CALC-MCNC: 3.4 G/DL (ref 2–4)
GLUCOSE SERPL-MCNC: 89 MG/DL (ref 65–100)
HCT VFR BLD AUTO: 41.8 % (ref 35–47)
HDLC SERPL-MCNC: 59 MG/DL
HDLC SERPL: 3.1 {RATIO} (ref 0–5)
HGB BLD-MCNC: 13.5 G/DL (ref 11.5–16)
IMM GRANULOCYTES # BLD AUTO: 0 K/UL (ref 0–0.04)
IMM GRANULOCYTES NFR BLD AUTO: 0 % (ref 0–0.5)
LDLC SERPL CALC-MCNC: 97 MG/DL (ref 0–100)
LYMPHOCYTES # BLD: 1.6 K/UL (ref 0.8–3.5)
LYMPHOCYTES NFR BLD: 30 % (ref 12–49)
MCH RBC QN AUTO: 29.6 PG (ref 26–34)
MCHC RBC AUTO-ENTMCNC: 32.3 G/DL (ref 30–36.5)
MCV RBC AUTO: 91.7 FL (ref 80–99)
MONOCYTES # BLD: 0.5 K/UL (ref 0–1)
MONOCYTES NFR BLD: 10 % (ref 5–13)
NEUTS SEG # BLD: 2.9 K/UL (ref 1.8–8)
NEUTS SEG NFR BLD: 57 % (ref 32–75)
NRBC # BLD: 0 K/UL (ref 0–0.01)
NRBC BLD-RTO: 0 PER 100 WBC
PLATELET # BLD AUTO: 236 K/UL (ref 150–400)
PMV BLD AUTO: 11.1 FL (ref 8.9–12.9)
POTASSIUM SERPL-SCNC: 3.8 MMOL/L (ref 3.5–5.1)
PROT SERPL-MCNC: 7.2 G/DL (ref 6.4–8.2)
RBC # BLD AUTO: 4.56 M/UL (ref 3.8–5.2)
SODIUM SERPL-SCNC: 137 MMOL/L (ref 136–145)
TRIGL SERPL-MCNC: 130 MG/DL (ref ?–150)
TSH SERPL DL<=0.05 MIU/L-ACNC: 0.51 UIU/ML (ref 0.36–3.74)
VLDLC SERPL CALC-MCNC: 26 MG/DL
WBC # BLD AUTO: 5.2 K/UL (ref 3.6–11)

## 2021-10-06 ENCOUNTER — HOSPITAL ENCOUNTER (OUTPATIENT)
Dept: ULTRASOUND IMAGING | Age: 76
Discharge: HOME | End: 2021-10-06
Attending: FAMILY MEDICINE
Payer: MEDICARE

## 2021-10-06 ENCOUNTER — HOSPITAL ENCOUNTER (OUTPATIENT)
Dept: MAMMOGRAPHY | Age: 76
Discharge: HOME | End: 2021-10-06
Attending: FAMILY MEDICINE
Payer: MEDICARE

## 2021-10-06 ENCOUNTER — TELEPHONE (OUTPATIENT)
Dept: FAMILY MEDICINE CLINIC | Age: 76
End: 2021-10-06

## 2021-10-06 DIAGNOSIS — R92.8 ABNORMAL MAMMOGRAM: ICD-10-CM

## 2021-10-06 DIAGNOSIS — N63.11 BREAST LUMP ON RIGHT SIDE AT 10 O'CLOCK POSITION: Primary | ICD-10-CM

## 2021-10-06 PROCEDURE — 77065 DX MAMMO INCL CAD UNI: CPT

## 2021-10-06 PROCEDURE — 76642 ULTRASOUND BREAST LIMITED: CPT

## 2021-10-06 PROCEDURE — 77061 BREAST TOMOSYNTHESIS UNI: CPT

## 2021-10-13 ENCOUNTER — HOSPITAL ENCOUNTER (OUTPATIENT)
Dept: MAMMOGRAPHY | Age: 76
Discharge: HOME | End: 2021-10-13
Attending: FAMILY MEDICINE
Payer: MEDICARE

## 2021-10-13 ENCOUNTER — HOSPITAL ENCOUNTER (OUTPATIENT)
Dept: ULTRASOUND IMAGING | Age: 76
Discharge: HOME | End: 2021-10-13
Attending: FAMILY MEDICINE
Payer: MEDICARE

## 2021-10-13 VITALS
HEART RATE: 56 BPM | BODY MASS INDEX: 22.28 KG/M2 | SYSTOLIC BLOOD PRESSURE: 189 MMHG | RESPIRATION RATE: 20 BRPM | DIASTOLIC BLOOD PRESSURE: 74 MMHG | HEIGHT: 61 IN | WEIGHT: 118 LBS | OXYGEN SATURATION: 98 %

## 2021-10-13 DIAGNOSIS — R92.8 ABNORMAL MAMMOGRAM OF RIGHT BREAST: ICD-10-CM

## 2021-10-13 DIAGNOSIS — N63.11 BREAST LUMP ON RIGHT SIDE AT 10 O'CLOCK POSITION: ICD-10-CM

## 2021-10-13 PROCEDURE — 88360 TUMOR IMMUNOHISTOCHEM/MANUAL: CPT

## 2021-10-13 PROCEDURE — 88377 M/PHMTRC ALYS ISHQUANT/SEMIQ: CPT

## 2021-10-13 PROCEDURE — 88305 TISSUE EXAM BY PATHOLOGIST: CPT

## 2021-10-13 PROCEDURE — 74011000250 HC RX REV CODE- 250: Performed by: RADIOLOGY

## 2021-10-13 PROCEDURE — 77065 DX MAMMO INCL CAD UNI: CPT

## 2021-10-13 PROCEDURE — 19083 BX BREAST 1ST LESION US IMAG: CPT

## 2021-10-13 PROCEDURE — 2709999900 HC NON-CHARGEABLE SUPPLY

## 2021-10-13 RX ORDER — LIDOCAINE HYDROCHLORIDE 10 MG/ML
5 INJECTION, SOLUTION EPIDURAL; INFILTRATION; INTRACAUDAL; PERINEURAL
Status: COMPLETED | OUTPATIENT
Start: 2021-10-13 | End: 2021-10-13

## 2021-10-13 RX ORDER — LIDOCAINE HYDROCHLORIDE AND EPINEPHRINE 10; 10 MG/ML; UG/ML
20 INJECTION, SOLUTION INFILTRATION; PERINEURAL ONCE
Status: COMPLETED | OUTPATIENT
Start: 2021-10-13 | End: 2021-10-13

## 2021-10-13 RX ADMIN — LIDOCAINE HYDROCHLORIDE AND EPINEPHRINE 200 MG: 10; 10 INJECTION, SOLUTION INFILTRATION; PERINEURAL at 13:00

## 2021-10-13 RX ADMIN — LIDOCAINE HYDROCHLORIDE 5 ML: 10 INJECTION, SOLUTION EPIDURAL; INFILTRATION; INTRACAUDAL; PERINEURAL at 13:00

## 2021-10-13 NOTE — ROUTINE PROCESS
1154 - pt ambulated to [facility]. For her post right breast biopsy mammogram accomp. By  Nurse and U/S tech. 1157 - right breast biopsy sample to gross room via nurse. 1159 - right breast biopsy sample signed into lab log.    1221 - I have reviewed discharge instructions with the patient. The patient verbalized understanding. Copy of discharge instructions per AVS copied, reviewed with pt, signature sheet signed and sent to med. Rec. R/t penpad not working and copy to pt. Prior to discharge. Pt stable without c/o pain. Pt. Dressing noted clean/dry/intact right breast without bleeding, swelling or pain noted. Pt dressed self - pt. ok'd for discharge per Dr. Heidi Sepulveda. - pt ambulated to waiting room without difficulty for discharge to home via private vehicle with her  driving her home. Pt encouraged to also take her normal blood pressure meds when she gets home.

## 2021-10-13 NOTE — PROGRESS NOTES
Dr. Rose Marie Alanis made aware of pt.'s Blood pressure 180/89 and 193/94. Discussed with pt. And pt. Stated she is unsure if she took her medicine this a.m. prior to coming in and  Pt.  States \"I am not worried about my pressure  - I will take my medicine when I get home\"

## 2021-10-13 NOTE — DISCHARGE INSTRUCTIONS
[address]  [address] [facility] [address], [address] [facility]  [phone]      Breast Biopsy Discharge Instructions          1. After the biopsy, we will place a clean covered ice pack over the biopsy site, within the bra - you should leave the ice pack on 30 minutes and then remove the ice pack for 1-2 hours until bedtime. If needed you can continue applying ice the following day. It is a good idea to wear your bra for support, both day and night unless this causes you discomfort. 2. You may take Tylenol (two tablets) every 4 to 6 hours as needed for pain. Do not take aspirin or aspirin products (e.g. ibuprofen, Advil, Motrin) as these may cause more bleeding. 3. You may expect some bruising and skin discoloration in the biopsy area. This is normal and generally should resolve in 5 to 7 days. 4. Most women do not find it necessary to restrict their activities after the procedure. You should rest as needed on the day of your biopsy. The next day, if you are feeling okay, you may resume your regular work/activity schedule. Avoid strenuous activity and heavy lifting, jogging, aerobics, or vacuuming for 48 hours after the procedure. 5. 48 hours after your biopsy, remove the large outer dressing and leave the steri-strips (tiny pieces of tape) in place. The steri-strips will usually fall off in a few days. You may shower 48 hours after your biopsy and you may get the steri-strips wet. If still present after 4 days, you may gently peel the strips off. Keep the area clean and dry and shower daily. 6. If you have bleeding from the incision area, hold firm pressure on the area for 20 minutes. This should control any slight oozing that might occur.   If you develop persistent bleeding or pain which does not respond to the above measures or if you develop a fever, excessive swelling, redness, heat or drainage, please call the Breast Health Navigator at [phone] during normal business hours (7 a.m. - 5 p.m.). After business hours, call [phone] and ask for the on-call Radiology Nurse to be paged, or your referring physician. 7. You will receive your biopsy results (pathology report) in 3-5 business days - the radiologist will review your results and you will receive a call from the radiology department and/or from your doctor. Physician :Dr. Kristi Campbell                                                                Nurse: Bacilio Matson, RN      Tech:  [employer], U/S    Mammo:

## 2021-10-15 RX ORDER — ATENOLOL 25 MG/1
TABLET ORAL
Qty: 180 TABLET | Refills: 3 | Status: SHIPPED | OUTPATIENT
Start: 2021-10-15

## 2021-10-19 ENCOUNTER — NURSE NAVIGATOR (OUTPATIENT)
Dept: CASE MANAGEMENT | Age: 76
End: 2021-10-19

## 2021-10-27 ENCOUNTER — OFFICE VISIT (OUTPATIENT)
Dept: SURGERY | Age: 76
End: 2021-10-27
Payer: MEDICARE

## 2021-10-27 VITALS — WEIGHT: 118 LBS | HEIGHT: 61 IN | BODY MASS INDEX: 22.28 KG/M2

## 2021-10-27 DIAGNOSIS — Z17.0 MALIGNANT NEOPLASM OF UPPER-INNER QUADRANT OF RIGHT BREAST IN FEMALE, ESTROGEN RECEPTOR POSITIVE (HCC): Primary | ICD-10-CM

## 2021-10-27 DIAGNOSIS — C50.211 MALIGNANT NEOPLASM OF UPPER-INNER QUADRANT OF RIGHT BREAST IN FEMALE, ESTROGEN RECEPTOR POSITIVE (HCC): Primary | ICD-10-CM

## 2021-10-27 PROCEDURE — 99205 OFFICE O/P NEW HI 60 MIN: CPT | Performed by: SURGERY

## 2021-10-27 PROCEDURE — 1090F PRES/ABSN URINE INCON ASSESS: CPT | Performed by: SURGERY

## 2021-10-27 PROCEDURE — 76642 ULTRASOUND BREAST LIMITED: CPT | Performed by: SURGERY

## 2021-10-27 RX ORDER — ATORVASTATIN CALCIUM 40 MG/1
TABLET, FILM COATED ORAL
Qty: 90 TABLET | Refills: 3 | Status: SHIPPED | OUTPATIENT
Start: 2021-10-27

## 2021-10-27 NOTE — PROGRESS NOTES
HISTORY OF PRESENT ILLNESS  Catrachita Mauricio is a 78 y.o. female. HPI NEW patient consult referred by  Dr. Dallas Robin for RIGHT breast invasive lobular carcinoma. Denies pain or changes to the breast.     Biopsy- 10/15/21 Grade 1, ER 99, MA 0, Ki 67 5%, Her2 equivocal, FISH negative       Family History:  Father- Colon cancer       Breast imaging-   Mammogram [facility] 10/6/21, BI-RADS 4C  [facility] Results (most recent):  Results from [place] encounter on 10/13/21    NEGIN POST BX IMAGING RT INCL CAD    Addendum 10/15/2021 11:50 AM  Addendum:    Addendum to ultrasound-guided right breast biopsy, performed on 10/13/2021    PATHOLOGY RESULTS: Invasive carcinoma with lobular features    RESULTS CONVEYED: Yes, by Dr. Jo Watts, by telephone    ASSESSMENT: These results are concordant with the imaging findings. RECOMMENDATIONS: Surgical consultation. Narrative  INDICATION: Suspicious right breast mass    TECHNIQUE: The risks and benefits of the procedure were discussed with the  patient. Written consent was obtained. Preliminary ultrasound imaging of the  right breast again demonstrated a 7 mm hypoechoic, shadowing mass at the 10:00  position, 6 cm from the nipple . The patient was prepped and draped in sterile  fashion. 1% lidocaine was injected intradermally. 1% lidocaine with epinephrine  was injected locally within the breast. A small dermatotomy was made. A 9-gauge  "Carmolex,"iva vacuum-assisted biopsy needle was then advanced into the lesion  under ultrasound guidance. 6 core specimens were obtained under direct  ultrasound visualization. A biopsy clip was then deployed at the target. Pressure was applied locally at the biopsy site. The puncture site was then  dressed appropriately. The patient tolerated the procedure well. There were no  immediate complications. Post procedure right breast digital mammography demonstrates no postbiopsy  complication.  A biopsy clip is present at the biopsy site on the ML view; it is  not visualized on the craniocaudad view due to posterior positioning. Impression  Successful ultrasound guided, vacuum-assisted core needle biopsy of  right breast mass. Biopsy clip appears to be appropriately positioned. Pathology  results are pending. Study Result    Narrative & Impression   INDICATION: Abnormal mammogram.  COMPARISON: Screening mammogram September 2021. Queta Bucker COMPOSITION: There are scattered fibroglandular densities. .   TECHNIQUE: Standard 2D, CC and MLO spot and full field ML views of the right  breast were performed with digital technique and subjected to computer-aided  detection. Tomosynthesis of the right breast was performed in CC and MLO spot  and full field ML projections. [facility] FINDINGS:   The additional views confirm a spiculated mass in the upper outer quadrant. Ultrasonography of the 10:00 position demonstrates a angular hypoechoic  shadowing 5.9 x 5.5 x 4.7 mm wider than tall mass. Ultrasonography of the right  axilla demonstrates no abnormal lymph nodes. .  IMPRESSION  1. BI-RADS category 4C, suspicious. 2. Biopsy of the mass within the right breast is recommended. 3. She was informed. Dr. Aly Jacobo office was called. 4. An addendum will be issued when prior exams become available.                  Review of Systems   HENT: Positive for hearing loss. Eyes: Positive for pain, discharge and redness. Cardiovascular: Positive for palpitations. Musculoskeletal: Positive for back pain, joint pain and neck pain. All other systems reviewed and are negative. Physical Exam  Vitals and nursing note reviewed. Constitutional:       Appearance: She is well-developed. HENT:      Head: Normocephalic. Neck:      Thyroid: No thyromegaly. Cardiovascular:      Rate and Rhythm: Normal rate and regular rhythm. Heart sounds: Normal heart sounds. Pulmonary:      Effort: Pulmonary effort is normal.      Breath sounds: Normal breath sounds.    Chest: Breasts: Breasts are symmetrical.         Right: No inverted nipple, mass, nipple discharge, skin change or tenderness. Left: No inverted nipple, mass, nipple discharge, skin change or tenderness. Abdominal:      Palpations: Abdomen is soft. Musculoskeletal:         General: Normal range of motion. Cervical back: Neck supple. Lymphadenopathy:      Cervical: No cervical adenopathy. Upper Body:      Right upper body: No supraclavicular, axillary or pectoral adenopathy. Left upper body: No supraclavicular, axillary or pectoral adenopathy. Skin:     General: Skin is warm and dry. Neurological:      Mental Status: She is alert and oriented to person, place, and time. BREAST ULTRASOUND, Preop planning  Indication:preop planning  right Breast 10:00    Technique: The area was scanned using a high-frequency linear-array near-field transducer  Findings: 7 mm  irregular mass with dropout with clip   Impression: Biopsy site visible with ultrasound  Disposition:  Will schedule lumpectomy with sentinel lymph node biopsy after breast mri    ASSESSMENT and PLAN    ICD-10-CM ICD-9-CM    1. Malignant neoplasm of upper-inner quadrant of right breast in female, estrogen receptor positive (HCC)  C50.211 174.2 MRI BREAST BI W WO CONT    Z17.0 V86.0      79 yo female with right breast T1 N0 ILC grade 1 ER + Pr neg her 2 eq  She is here with her   I have reviewed the imaging and pathology with her and she was given copies of these reports. 90 minutes were spent face-to-face with the patient during this encounter and 90% of that time was spent on counseling and coordination of care. 1. Discussed lumpectomy and radiation vs mastectomy. Discussed reconstruction. MRI ordered to see if patient is a candidate for a lumpectomy. 2. Discussed sentinel lymph node biopsy. 3. Discussed external beam radiation. 4. Discussed hormone therapy. 5. Discussed the possibility of chemotherapy. Plan will be right us guided lumpectomy, sln biopsy   I will call hear after breast mri. They were happy with plan.

## 2021-11-10 ENCOUNTER — HOSPITAL ENCOUNTER (OUTPATIENT)
Dept: MRI IMAGING | Age: 76
Discharge: HOME | End: 2021-11-10
Attending: SURGERY
Payer: MEDICARE

## 2021-11-10 DIAGNOSIS — Z17.0 MALIGNANT NEOPLASM OF UPPER-INNER QUADRANT OF RIGHT BREAST IN FEMALE, ESTROGEN RECEPTOR POSITIVE (HCC): ICD-10-CM

## 2021-11-10 DIAGNOSIS — C50.211 MALIGNANT NEOPLASM OF UPPER-INNER QUADRANT OF RIGHT BREAST IN FEMALE, ESTROGEN RECEPTOR POSITIVE (HCC): ICD-10-CM

## 2021-11-10 PROCEDURE — 74011250636 HC RX REV CODE- 250/636: Performed by: SURGERY

## 2021-11-10 PROCEDURE — A9585 GADOBUTROL INJECTION: HCPCS | Performed by: SURGERY

## 2021-11-10 PROCEDURE — 77049 MRI BREAST C-+ W/CAD BI: CPT

## 2021-11-10 RX ADMIN — GADOBUTROL 5 ML: 604.72 INJECTION INTRAVENOUS at 11:28

## 2021-11-11 NOTE — PROGRESS NOTES
Called with mri  Will need additional biopsy   No answer  Melvin Due im out tomorrow will you please call her if I don't talk to her today?   Thanks  [employer]

## 2021-11-12 ENCOUNTER — DOCUMENTATION ONLY (OUTPATIENT)
Dept: SURGERY | Age: 76
End: 2021-11-12

## 2021-11-12 DIAGNOSIS — C50.911 MALIGNANT NEOPLASM OF RIGHT FEMALE BREAST, UNSPECIFIED ESTROGEN RECEPTOR STATUS, UNSPECIFIED SITE OF BREAST (HCC): ICD-10-CM

## 2021-11-12 DIAGNOSIS — R92.8 ABNORMAL MRI, BREAST: Primary | ICD-10-CM

## 2021-11-12 NOTE — PROGRESS NOTES
Patient and her  called for her MRI results. Notified Dr. Colonel Valiente of the the MRI results and she will call patient to notify patient she needs a breast mri biopsy. Will have MRI navigators to help schedule this breast MRI.

## 2021-11-14 ENCOUNTER — TELEPHONE (OUTPATIENT)
Dept: SURGERY | Age: 76
End: 2021-11-14

## 2021-11-14 DIAGNOSIS — R92.8 ABNORMAL MRI, BREAST: Primary | ICD-10-CM

## 2021-11-14 DIAGNOSIS — R92.8 ABNORMAL ULTRASOUND OF BREAST: ICD-10-CM

## 2021-11-14 NOTE — TELEPHONE ENCOUNTER
Called patient and her . Plan:  Second look ultrasound with ultrasound or MRI guided biopsy. I put the orders in. MRI Results (most recent):  [facility]    MRI BREAST BI W WO CONT    Narrative  EXAM:  MRI BREAST BI W WO CONT    INDICATION: New diagnosis of right breast carcinoma in the 10:00 position. Evaluate extent of disease. COMPARISON: Right breast ultrasound on 10/6/2021. Mammography on 9/13/2021 and  10/6/2021. No comparison MRI. EXAM: MRI of right and left breast without and with IV contrast Gadavist .    TECHNIQUE: MRI breast without and with IV contrast. Bilateral breast MRI was  performed using a dedicated breast coil without compression with the patient in  the prone position. Precontrast T1-weighted images with fat suppression were  obtained followed by bolus injection of 5 mL of Gadavist  performed on the 3  Lorena magnet. Postcontrast dynamic and high-resolution images were acquired. Axial T2 fat-saturated imaging was also performed. The images were analyzed  using CAD analysis, enhancement curves, digital subtraction, and 2 and 3  dimensional reconstructions. FINDINGS:    Background parenchymal enhancement: Mild. Lymph nodes: No lymphadenopathy. Right breast: 0.6 x 0.6 x 0.5 cm mildly irregular mass with persistent kinetics  is in the posterior third in the 11:00 position. Biopsy clip is at the posterior  margin of the mass. No extension to chest wall or skin. In the posterior third of the 9:00 position, an oval mass measures 0.6 x 0.4 x  0.3 cm. Persistent kinetics. No T2 characteristic. No adjacent blood vessel. This finding is located 2.4 cm inferior to the biopsy-proven carcinoma. There is  no correlate on the comparison mammograms. No other suspicious morphology or enhancement in the right breast. Normal skin  and nipple. Left breast: There is no suspicious focus of morphology or temporal enhancement.   Normal skin and nipple. Miscellaneous: None. Impression  1. 0.6 cm biopsy-proven carcinoma in the posterior third of the right breast at  11:00.  2. 0.6 cm suspicious mass in the 9:00 position of the posterior third of the  right breast is located 2.4 cm inferior to the biopsy-proven carcinoma. 3. No MRI evidence of malignancy in the left breast.  4. No lymphadenopathy. Assessment: ACR BI-RADS category  Right breast: BI-RADS Assessment Category 6: Known biopsy proven malignancy-  Appropriate action should be taken. Left breast: BI-RADS Assessment Category 1: Negative. Recommendation: Second look ultrasound of the right breast in the 9:00 position. Depending on results, ultrasound-guided or MRI guided biopsy of suspicious  subcentimeter mass in the right breast in the 9:00 position. A negative breast MRI examination speaks strongly against invasive cancer down  to a detection threshold of 3 to 5 mm but may not detect some lower grade or in  situ carcinomas. Therefore, routine clinical and mammographic followup are  recommended. A summary portfolio has been created in PACS. EPIC email sent to Dr. Ricky Carrion at the time of the dictation.

## 2021-11-18 ENCOUNTER — HOSPITAL ENCOUNTER (OUTPATIENT)
Dept: MAMMOGRAPHY | Age: 76
Discharge: HOME | End: 2021-11-18
Attending: SURGERY
Payer: MEDICARE

## 2021-11-18 DIAGNOSIS — R92.8 ABNORMAL MRI, BREAST: ICD-10-CM

## 2021-11-18 DIAGNOSIS — R92.8 ABNORMAL ULTRASOUND OF BREAST: ICD-10-CM

## 2021-11-18 PROCEDURE — 19083 BX BREAST 1ST LESION US IMAG: CPT

## 2021-11-18 PROCEDURE — 76642 ULTRASOUND BREAST LIMITED: CPT

## 2021-11-18 PROCEDURE — 77065 DX MAMMO INCL CAD UNI: CPT

## 2021-11-18 PROCEDURE — 74011000250 HC RX REV CODE- 250: Performed by: RADIOLOGY

## 2021-11-18 PROCEDURE — 88305 TISSUE EXAM BY PATHOLOGIST: CPT

## 2021-11-18 RX ORDER — LIDOCAINE HYDROCHLORIDE 10 MG/ML
5 INJECTION INFILTRATION; PERINEURAL
Status: COMPLETED | OUTPATIENT
Start: 2021-11-18 | End: 2021-11-18

## 2021-11-18 RX ORDER — LIDOCAINE HYDROCHLORIDE AND EPINEPHRINE 10; 10 MG/ML; UG/ML
20 INJECTION, SOLUTION INFILTRATION; PERINEURAL ONCE
Status: COMPLETED | OUTPATIENT
Start: 2021-11-18 | End: 2021-11-18

## 2021-11-18 RX ADMIN — LIDOCAINE HYDROCHLORIDE,EPINEPHRINE BITARTRATE 200 MG: 10; .01 INJECTION, SOLUTION INFILTRATION; PERINEURAL at 10:50

## 2021-11-18 RX ADMIN — LIDOCAINE HYDROCHLORIDE 5 ML: 10 INJECTION, SOLUTION INFILTRATION; PERINEURAL at 10:50

## 2021-11-18 NOTE — PROGRESS NOTES
Patient tolerated right breast biopsy well with scant bleeding. Biopsy site was bandaged in the usual fashion and discharge instructions were reviewed with the patient. She was provided with a written copy as well. Advised patient that results should be available by Monday and that she will receive a phone call in the afternoon. Encouraged her to call with any questions or concerns.

## 2021-11-27 ENCOUNTER — HOSPITAL ENCOUNTER (EMERGENCY)
Age: 76
Discharge: HOME | End: 2021-11-27
Attending: EMERGENCY MEDICINE
Payer: MEDICARE

## 2021-11-27 VITALS
RESPIRATION RATE: 18 BRPM | WEIGHT: 117.28 LBS | BODY MASS INDEX: 22.14 KG/M2 | HEART RATE: 69 BPM | SYSTOLIC BLOOD PRESSURE: 172 MMHG | OXYGEN SATURATION: 100 % | HEIGHT: 61 IN | TEMPERATURE: 97.5 F | DIASTOLIC BLOOD PRESSURE: 67 MMHG

## 2021-11-27 DIAGNOSIS — L24.89 IRRITANT CONTACT DERMATITIS DUE TO OTHER AGENTS: Primary | ICD-10-CM

## 2021-11-27 PROCEDURE — 99281 EMR DPT VST MAYX REQ PHY/QHP: CPT

## 2021-11-27 RX ORDER — HYDROCORTISONE 0.5 %
OINTMENT (GRAM) TOPICAL 3 TIMES DAILY
Qty: 15 G | Refills: 0 | Status: SHIPPED | OUTPATIENT
Start: 2021-11-27 | End: 2021-12-07

## 2021-11-27 RX ORDER — HYDROXYZINE 50 MG/1
50 TABLET, FILM COATED ORAL
Qty: 20 TABLET | Refills: 0 | Status: SHIPPED | OUTPATIENT
Start: 2021-11-27 | End: 2021-12-07

## 2021-11-27 NOTE — DISCHARGE INSTRUCTIONS
It was a pleasure taking care of you at [facility] Emergency Department today. We know that when you come to [facility], you are entrusting us with your health, comfort, and safety. Our physicians and nurses honor that trust, and we truly appreciate the opportunity to care for you and your loved ones. We also value your feedback. If you receive a survey about your Emergency Department experience today, please fill it out. We care about our patients' feedback, and we listen to what you have to say. Thank you!

## 2021-11-27 NOTE — ED PROVIDER NOTES
EMERGENCY DEPARTMENT HISTORY AND PHYSICAL EXAM      Date: 11/27/2021  Patient Name: Rina Aguilar    History of Presenting Illness     Chief Complaint   Patient presents with    Rash     rightsided rash at area of lumpectomy        History Provided By: Patient    HPI: Rina Aguilar, 78 y.o. female with significant PMHx for recent diagnosis of breast cancer, presents by POV to the ED with cc of a rash on her right breast.  The rash started the day after receiving a biopsy of her right breast.  The rash is painful and slightly itchy. There is been no treatment prior to arrival.    There are no other complaints, changes, or physical findings at this time. Social Hx: Tobacco (denies), EtOH (denies), Illicit drug use (denies)     PCP: Jimmy Garcia MD    No current facility-administered medications on file prior to encounter. Current Outpatient Medications on File Prior to Encounter   Medication Sig Dispense Refill    atorvastatin (LIPITOR) 40 mg tablet TAKE 1 TABLET NIGHTLY FOR CHOLESTEROL 90 Tablet 3    atenoloL (TENORMIN) 25 mg tablet TAKE 2 TABLETS DAILY 180 Tablet 3    pantoprazole (PROTONIX) 40 mg tablet Take 1 Tablet by mouth daily. 90 Tablet 1    hydroCHLOROthiazide (HYDRODIURIL) 12.5 mg tablet TAKE 1 TABLET DAILY 90 Tablet 3    alendronate (FOSAMAX) 70 mg tablet TAKE 1 TABLET EVERY 7 DAYS 12 Tab 3    levothyroxine (SYNTHROID) 137 mcg tablet TAKE 1 TABLET DAILY BEFORE BREAKFAST 90 Tab 3    loratadine (CLARITIN) 10 mg tablet Take 10 mg by mouth.  lutein 20 mg tab Take 20 mg by mouth daily.  fluticasone (FLONASE) 50 mcg/actuation nasal spray 1 spray each nostril twice a day (Patient taking differently: 2 Sprays by Both Nostrils route as needed. 1 spray each nostril twice a day) 1 Bottle 5    cholecalciferol, vitamin d3, 400 unit cap Take  by mouth.          Past History     Past Medical History:  Past Medical History:   Diagnosis Date    Arrhythmia     GERD (gastroesophageal reflux disease)     High cholesterol     HTN (hypertension)     Hypothyroid     Shingles outbreak        Past Surgical History:  Past Surgical History:   Procedure Laterality Date    COLONOSCOPY N/A 6/7/2019    COLONOSCOPY performed by Marietta Nj MD at [facility] ENDOSCOPY    HX GYN      hysterectomy       Family History:  Family History   Problem Relation Age of Onset    Hypertension Sister     Stroke Mother     Cancer Father        Social History:  Social History     Tobacco Use    Smoking status: Never Smoker    Smokeless tobacco: Never Used   Vaping Use    Vaping Use: Never used   Substance Use Topics    Alcohol use: No    Drug use: Never       Allergies: Allergies   Allergen Reactions    Pneumococcal 23-Roxanne Ps Vaccine Swelling         Review of Systems   Review of Systems   Constitutional: Negative for chills, diaphoresis and fever. HENT: Negative for congestion, ear pain, rhinorrhea and sore throat. Respiratory: Negative for cough and shortness of breath. Cardiovascular: Negative for chest pain. Gastrointestinal: Negative for abdominal pain, constipation, diarrhea, nausea and vomiting. Genitourinary: Negative for difficulty urinating, dysuria, frequency and hematuria. Musculoskeletal: Negative for arthralgias and myalgias. Skin: Positive for rash. Neurological: Negative for headaches. All other systems reviewed and are negative. Physical Exam   Physical Exam  Vitals and nursing note reviewed. Constitutional:       General: She is not in acute distress. Appearance: She is well-developed. She is not diaphoretic. Comments: Pleasant 78 y.o.  female    HENT:      Head: Normocephalic and atraumatic. Eyes:      General:         Right eye: No discharge. Left eye: No discharge. Conjunctiva/sclera: Conjunctivae normal.   Cardiovascular:      Rate and Rhythm: Normal rate. Pulmonary:      Effort: Pulmonary effort is normal. No respiratory distress. Musculoskeletal:      Cervical back: Normal range of motion and neck supple. Skin:     General: Skin is warm and dry. Findings: Rash present. Comments: Fine, slightly raised sandpaper like rash to the upper, outer quadrant of the right breast.    Neurological:      Mental Status: She is alert and oriented to person, place, and time. Psychiatric:         Behavior: Behavior normal.         Diagnostic Study Results     Labs - none    Radiologic Studies - none    Medical Decision Making   I am the first provider for this patient. I reviewed the vital signs, available nursing notes, past medical history, past surgical history, family history and social history. Vital Signs-Reviewed the patient's vital signs. Patient Vitals for the past 12 hrs:   Temp Pulse Resp BP SpO2   11/27/21 1345 97.5 °F (36.4 °C) 69 18 (!) 172/67 100 %       Records Reviewed: Nursing Notes and Old Medical Records    Provider Notes (Medical Decision Making):   Patient presents the ED for a rash. Vitals are stable. Differential diagnosis include contact dermatitis, allergic reaction, eczema, cellulitis. Exam is consistent with a contact dermatitis. Will apply steroid creams and have the patient take an antihistamine. She should follow-up with her breast surgeon as scheduled. She can return to the ED for deterioration. ED Course:   Initial assessment performed. The patients presenting problems have been discussed, and they are in agreement with the care plan formulated and outlined with them. I have encouraged them to ask questions as they arise throughout their visit. Critical Care Time: None    Disposition:  DISCHARGE NOTE:  2:21 PM  The pt is ready for discharge. The pt's signs, symptoms, diagnosis, and discharge instructions have been discussed and pt has conveyed their understanding. The pt is to follow up as recommended or return to ER should their symptoms worsen.  Plan has been discussed and pt is in agreement. PLAN:  1. Current Discharge Medication List      START taking these medications    Details   hydrOXYzine HCL (ATARAX) 50 mg tablet Take 1 Tablet by mouth every six (6) hours as needed for Itching for up to 10 days. Qty: 20 Tablet, Refills: 0  Start date: 11/27/2021, End date: 12/7/2021      hydrocortisone (CORTIZONE) 0.5 % ointment Apply  to affected area three (3) times daily for 10 days. Apply to affected area  Qty: 15 g, Refills: 0  Start date: 11/27/2021, End date: 12/7/2021           2. Follow-up Information     Follow up With Specialties Details Why Contact Info    Alex Trjuillo MD Surgery, Breast Surgery  as scheduled Edilberto CLAROS.EMMA [address] [phone]      [facility] EMERGENCY DEPT Emergency Medicine  If symptoms worsen [address] [place] [address] Drive  [address] [facility]  [phone]        Return to ED if worse     Diagnosis     Clinical Impression:   1. Irritant contact dermatitis due to other agents          Please note that this dictation was completed with [employer], the computer voice recognition software. Quite often unanticipated grammatical, syntax, homophones, and other interpretive errors are inadvertently transcribed by the computer software. Please disregards these errors. Please excuse any errors that have escaped final proofreading.

## 2021-11-30 ENCOUNTER — OFFICE VISIT (OUTPATIENT)
Dept: FAMILY MEDICINE CLINIC | Age: 76
End: 2021-11-30
Payer: MEDICARE

## 2021-11-30 VITALS
DIASTOLIC BLOOD PRESSURE: 75 MMHG | HEART RATE: 58 BPM | HEIGHT: 61 IN | SYSTOLIC BLOOD PRESSURE: 151 MMHG | OXYGEN SATURATION: 96 % | WEIGHT: 124 LBS | TEMPERATURE: 97.2 F | BODY MASS INDEX: 23.41 KG/M2 | RESPIRATION RATE: 16 BRPM

## 2021-11-30 DIAGNOSIS — L24.9 IRRITANT CONTACT DERMATITIS, UNSPECIFIED TRIGGER: Primary | ICD-10-CM

## 2021-11-30 DIAGNOSIS — F43.0 STRESS REACTION: ICD-10-CM

## 2021-11-30 PROCEDURE — G8427 DOCREV CUR MEDS BY ELIG CLIN: HCPCS | Performed by: FAMILY MEDICINE

## 2021-11-30 PROCEDURE — G8420 CALC BMI NORM PARAMETERS: HCPCS | Performed by: FAMILY MEDICINE

## 2021-11-30 PROCEDURE — G8754 DIAS BP LESS 90: HCPCS | Performed by: FAMILY MEDICINE

## 2021-11-30 PROCEDURE — 3017F COLORECTAL CA SCREEN DOC REV: CPT | Performed by: FAMILY MEDICINE

## 2021-11-30 PROCEDURE — G0463 HOSPITAL OUTPT CLINIC VISIT: HCPCS | Performed by: FAMILY MEDICINE

## 2021-11-30 PROCEDURE — 1090F PRES/ABSN URINE INCON ASSESS: CPT | Performed by: FAMILY MEDICINE

## 2021-11-30 PROCEDURE — 99215 OFFICE O/P EST HI 40 MIN: CPT | Performed by: FAMILY MEDICINE

## 2021-11-30 PROCEDURE — 1101F PT FALLS ASSESS-DOCD LE1/YR: CPT | Performed by: FAMILY MEDICINE

## 2021-11-30 PROCEDURE — G9899 SCRN MAM PERF RSLTS DOC: HCPCS | Performed by: FAMILY MEDICINE

## 2021-11-30 PROCEDURE — G8536 NO DOC ELDER MAL SCRN: HCPCS | Performed by: FAMILY MEDICINE

## 2021-11-30 PROCEDURE — G8753 SYS BP > OR = 140: HCPCS | Performed by: FAMILY MEDICINE

## 2021-11-30 PROCEDURE — G8510 SCR DEP NEG, NO PLAN REQD: HCPCS | Performed by: FAMILY MEDICINE

## 2021-11-30 RX ORDER — MAG HYDROX/ALUMINUM HYD/SIMETH 200-200-20
SUSPENSION, ORAL (FINAL DOSE FORM) ORAL
COMMUNITY
Start: 2021-11-27 | End: 2021-12-08

## 2021-11-30 NOTE — PROGRESS NOTES
1. Have you been to the ER, urgent care clinic since your last visit? Hospitalized since your last visit? Yes. [facility]    2. Have you seen or consulted any other health care providers outside of the [address] [place] [address] since your last visit? Include any pap smears or colon screening. No     Health Maintenance Due   Topic Date Due    DTaP/Tdap/Td series (1 - Tdap) Never done    COVID-19 Vaccine (3 - Booster for Pfizer series) 09/27/2021     Do you have an [address] in place in the event that you have a healthcare crisis that could impact your decision making as it pertains to your health? YES    Would you like information about Advance Care Planning? NO    Information given.  NO

## 2021-11-30 NOTE — PROGRESS NOTES
ELBA Ochoa is a 78 y.o. female who presents with a concern about the rash. She had a needle biopsy of right breast 11/18. Believes that a rash then cropped up 11/23. It seemed to begin above the right breast extend down the lateral breast and axilla down to the bottom of her rib cage. This brought her to the emergency room 11/27. Diagnosed with contact dermatitis and given hydroxyzine and hydrocortisone. Got substantial relief from these medicines. The rash no longer itches and she feels that it is starting to look like it is clearing up. They are here today because they were concerned this could potentially be shingles    She recalls seeing me for shingles in the past.  That was in 2017 and it was actually more likely to be eczema. That episode involve the left axilla    PMHx:  Past Medical History:   Diagnosis Date    Arrhythmia     GERD (gastroesophageal reflux disease)     High cholesterol     HTN (hypertension)     Hypothyroid     Shingles outbreak        Meds:   Current Outpatient Medications   Medication Sig Dispense Refill    hydrocortisone (HYCORT) 1 % ointment       hydrOXYzine HCL (ATARAX) 50 mg tablet Take 1 Tablet by mouth every six (6) hours as needed for Itching for up to 10 days. 20 Tablet 0    hydrocortisone (CORTIZONE) 0.5 % ointment Apply  to affected area three (3) times daily for 10 days. Apply to affected area 15 g 0    atorvastatin (LIPITOR) 40 mg tablet TAKE 1 TABLET NIGHTLY FOR CHOLESTEROL 90 Tablet 3    atenoloL (TENORMIN) 25 mg tablet TAKE 2 TABLETS DAILY 180 Tablet 3    pantoprazole (PROTONIX) 40 mg tablet Take 1 Tablet by mouth daily. 90 Tablet 1    hydroCHLOROthiazide (HYDRODIURIL) 12.5 mg tablet TAKE 1 TABLET DAILY 90 Tablet 3    alendronate (FOSAMAX) 70 mg tablet TAKE 1 TABLET EVERY 7 DAYS 12 Tab 3    levothyroxine (SYNTHROID) 137 mcg tablet TAKE 1 TABLET DAILY BEFORE BREAKFAST 90 Tab 3    loratadine (CLARITIN) 10 mg tablet Take 10 mg by mouth.  lutein 20 mg tab Take 20 mg by mouth daily.  fluticasone (FLONASE) 50 mcg/actuation nasal spray 1 spray each nostril twice a day (Patient taking differently: 2 Sprays by Both Nostrils route as needed. 1 spray each nostril twice a day) 1 Bottle 5    cholecalciferol, vitamin d3, 400 unit cap Take  by mouth. Allergies: Allergies   Allergen Reactions    Pneumococcal 23-Roxanne Ps Vaccine Swelling       Smoker:  Social History     Tobacco Use   Smoking Status Never Smoker   Smokeless Tobacco Never Used       ETOH:   Social History     Substance and Sexual Activity   Alcohol Use No       FH:   Family History   Problem Relation Age of Onset    Hypertension Sister     Stroke Mother     Cancer Father        ROS:   As listed in HPI. In addition:  Constitutional:   No headache, fever, fatigue, weight loss or weight gain      Cardiac:    No chest pain      Resp:   No cough or shortness of breath      Neuro   No loss of consciousness, dizziness, seizures      Physical Exam:  Blood pressure (!) 151/75, pulse (!) 58, temperature 97.2 °F (36.2 °C), temperature source Temporal, resp. rate 16, height 5' 1\" (1.549 m), weight 124 lb (56.2 kg), last menstrual period 01/06/1996, SpO2 96 %. GEN: No apparent distress. Alert and oriented and responds to all questions appropriately. NEUROLOGIC:  No focal neurologic deficits. Strength and sensation grossly intact. Coordination and gait grossly intact. EXT: Well perfused. No edema. SKIN: atopic dermatitis of the right rib cage. There is a light sandpapery rash lateral to the midclavicular line and above the right breast.  Extends into the right axilla. Does not obviously involve the lateral breast.  There appears to be remnants of the rash right lower rib cage but this is clearing up       Assessment and Plan     Atopic dermatitis. It is possible this could be related to tape or cleansing agent used before her biopsy.   Bryan noting that it did not produce a rash with her first biopsy. This is not shingles. Provided reassurance. Stress reaction   brought up the concern about anxiety. Patient did not realize that she had anxiety. She does feel stressed as would be expected with her cancer diagnosis. Some family members felt like she had a personality change over the summer when they went to visit in [facility]. Describes her as more snappy. Patient did not recognize this. I saw her 2 months ago and felt like she was at her cognitive baseline. Today she does look preoccupied which suggests to me that she is having a stress reaction to her cancer diagnosis which has been recurrent since I saw her last    I do not think that anxiety medication is necessarily indicated especially considering patient did not identify this as a problem herself. Encouraged her to get plenty of physical exercise and to be open and conversant with her family and friends    She is happy with this plan and welcome to return to discuss her anxiety if she changes her mind and feels that she has a problem    This was a 40-minute visit. Greater than 50% of the time was spent counseling the patient on dermatitis and stress reaction. ICD-10-CM ICD-9-CM    1. Irritant contact dermatitis, unspecified trigger  L24.9 692.9    2. Stress reaction  F43.0 308.9        AVS given.  Pt expressed understanding of instructions

## 2021-12-02 DIAGNOSIS — Z17.0 MALIGNANT NEOPLASM OF UPPER-INNER QUADRANT OF RIGHT BREAST IN FEMALE, ESTROGEN RECEPTOR POSITIVE (HCC): Primary | ICD-10-CM

## 2021-12-02 DIAGNOSIS — C50.211 MALIGNANT NEOPLASM OF UPPER-INNER QUADRANT OF RIGHT BREAST IN FEMALE, ESTROGEN RECEPTOR POSITIVE (HCC): Primary | ICD-10-CM

## 2021-12-13 ENCOUNTER — HOSPITAL ENCOUNTER (OUTPATIENT)
Dept: PREADMISSION TESTING | Age: 76
Discharge: HOME | End: 2021-12-13
Payer: MEDICARE

## 2021-12-13 PROCEDURE — U0005 INFEC AGEN DETEC AMPLI PROBE: HCPCS

## 2021-12-14 LAB
SARS-COV-2, XPLCVT: NOT DETECTED
SOURCE, COVRS: NORMAL

## 2021-12-15 ENCOUNTER — ANESTHESIA EVENT (OUTPATIENT)
Dept: SURGERY | Age: 76
End: 2021-12-15
Payer: MEDICARE

## 2021-12-16 ENCOUNTER — ANESTHESIA (OUTPATIENT)
Dept: SURGERY | Age: 76
End: 2021-12-16
Payer: MEDICARE

## 2021-12-16 ENCOUNTER — APPOINTMENT (OUTPATIENT)
Dept: NUCLEAR MEDICINE | Age: 76
End: 2021-12-16
Attending: SURGERY
Payer: MEDICARE

## 2021-12-16 ENCOUNTER — HOSPITAL ENCOUNTER (OUTPATIENT)
Age: 76
Setting detail: OUTPATIENT SURGERY
Discharge: HOME | End: 2021-12-16
Attending: SURGERY | Admitting: SURGERY
Payer: MEDICARE

## 2021-12-16 VITALS
SYSTOLIC BLOOD PRESSURE: 163 MMHG | RESPIRATION RATE: 14 BRPM | BODY MASS INDEX: 23.22 KG/M2 | HEART RATE: 76 BPM | TEMPERATURE: 97.8 F | WEIGHT: 123 LBS | OXYGEN SATURATION: 96 % | HEIGHT: 61 IN | DIASTOLIC BLOOD PRESSURE: 75 MMHG

## 2021-12-16 DIAGNOSIS — C50.211 MALIGNANT NEOPLASM OF UPPER-INNER QUADRANT OF RIGHT BREAST IN FEMALE, ESTROGEN RECEPTOR POSITIVE (HCC): ICD-10-CM

## 2021-12-16 DIAGNOSIS — Z17.0 MALIGNANT NEOPLASM OF UPPER-INNER QUADRANT OF RIGHT BREAST IN FEMALE, ESTROGEN RECEPTOR POSITIVE (HCC): ICD-10-CM

## 2021-12-16 PROCEDURE — 74011250636 HC RX REV CODE- 250/636: Performed by: SURGERY

## 2021-12-16 PROCEDURE — 77030018673: Performed by: SURGERY

## 2021-12-16 PROCEDURE — 77030020143 HC AIRWY LARYN INTUB CGAS -A: Performed by: NURSE ANESTHETIST, CERTIFIED REGISTERED

## 2021-12-16 PROCEDURE — 77030021352 HC CBL LD SYS DISP COVD -B: Performed by: SURGERY

## 2021-12-16 PROCEDURE — 76060000062 HC AMB SURG ANES 1 TO 1.5 HR: Performed by: SURGERY

## 2021-12-16 PROCEDURE — 88307 TISSUE EXAM BY PATHOLOGIST: CPT

## 2021-12-16 PROCEDURE — 2709999900 HC NON-CHARGEABLE SUPPLY: Performed by: SURGERY

## 2021-12-16 PROCEDURE — 77030002996 HC SUT SLK J&J -A: Performed by: SURGERY

## 2021-12-16 PROCEDURE — 38525 BIOPSY/REMOVAL LYMPH NODES: CPT | Performed by: SURGERY

## 2021-12-16 PROCEDURE — 74011250636 HC RX REV CODE- 250/636: Performed by: NURSE ANESTHETIST, CERTIFIED REGISTERED

## 2021-12-16 PROCEDURE — 74011000250 HC RX REV CODE- 250: Performed by: NURSE ANESTHETIST, CERTIFIED REGISTERED

## 2021-12-16 PROCEDURE — 77030034626 HC LIGASURE SM JAW SEAL OPN SURG COVD -E: Performed by: SURGERY

## 2021-12-16 PROCEDURE — 19301 PARTIAL MASTECTOMY: CPT | Performed by: SURGERY

## 2021-12-16 PROCEDURE — 76998 US GUIDE INTRAOP: CPT | Performed by: SURGERY

## 2021-12-16 PROCEDURE — 77030002933 HC SUT MCRYL J&J -A: Performed by: SURGERY

## 2021-12-16 PROCEDURE — 74011000250 HC RX REV CODE- 250: Performed by: SURGERY

## 2021-12-16 PROCEDURE — 77030011825 HC SUPP SURG PSTOP S2SG -B: Performed by: SURGERY

## 2021-12-16 PROCEDURE — A4648 IMPLANTABLE TISSUE MARKER: HCPCS | Performed by: SURGERY

## 2021-12-16 PROCEDURE — 76030000001 HC AMB SURG OR TIME 1 TO 1.5: Performed by: SURGERY

## 2021-12-16 PROCEDURE — 77030031139 HC SUT VCRL2 J&J -A: Performed by: SURGERY

## 2021-12-16 PROCEDURE — 76210000050 HC AMBSU PH II REC 0.5 TO 1 HR: Performed by: SURGERY

## 2021-12-16 PROCEDURE — 77030011267 HC ELECTRD BLD COVD -A: Performed by: SURGERY

## 2021-12-16 PROCEDURE — 76210000040 HC AMBSU PH I REC FIRST 0.5 HR: Performed by: SURGERY

## 2021-12-16 PROCEDURE — 74011250636 HC RX REV CODE- 250/636: Performed by: ANESTHESIOLOGY

## 2021-12-16 PROCEDURE — A9520 TC99 TILMANOCEPT DIAG 0.5MCI: HCPCS

## 2021-12-16 PROCEDURE — 77030020269 HC MISC IMPL: Performed by: SURGERY

## 2021-12-16 PROCEDURE — 77030034556 HC PRB MARGN DETECT LUMPECTMY DISP DUNE -G: Performed by: SURGERY

## 2021-12-16 DEVICE — MARKER TISS VERAFORM: Type: IMPLANTABLE DEVICE | Site: BREAST | Status: FUNCTIONAL

## 2021-12-16 RX ORDER — HYDROMORPHONE HYDROCHLORIDE 1 MG/ML
.2-.5 INJECTION, SOLUTION INTRAMUSCULAR; INTRAVENOUS; SUBCUTANEOUS ONCE
Status: DISCONTINUED | OUTPATIENT
Start: 2021-12-16 | End: 2021-12-16 | Stop reason: HOSPADM

## 2021-12-16 RX ORDER — FENTANYL CITRATE 50 UG/ML
INJECTION, SOLUTION INTRAMUSCULAR; INTRAVENOUS AS NEEDED
Status: DISCONTINUED | OUTPATIENT
Start: 2021-12-16 | End: 2021-12-16 | Stop reason: HOSPADM

## 2021-12-16 RX ORDER — TRAMADOL HYDROCHLORIDE 50 MG/1
50 TABLET ORAL
Qty: 20 TABLET | Refills: 0 | Status: SHIPPED | OUTPATIENT
Start: 2021-12-16 | End: 2021-12-19

## 2021-12-16 RX ORDER — LIDOCAINE HYDROCHLORIDE 20 MG/ML
INJECTION, SOLUTION EPIDURAL; INFILTRATION; INTRACAUDAL; PERINEURAL AS NEEDED
Status: DISCONTINUED | OUTPATIENT
Start: 2021-12-16 | End: 2021-12-16 | Stop reason: HOSPADM

## 2021-12-16 RX ORDER — ONDANSETRON 2 MG/ML
INJECTION INTRAMUSCULAR; INTRAVENOUS AS NEEDED
Status: DISCONTINUED | OUTPATIENT
Start: 2021-12-16 | End: 2021-12-16 | Stop reason: HOSPADM

## 2021-12-16 RX ORDER — LIDOCAINE HYDROCHLORIDE 10 MG/ML
0.1 INJECTION, SOLUTION EPIDURAL; INFILTRATION; INTRACAUDAL; PERINEURAL AS NEEDED
Status: DISCONTINUED | OUTPATIENT
Start: 2021-12-16 | End: 2021-12-16 | Stop reason: HOSPADM

## 2021-12-16 RX ORDER — OXYCODONE AND ACETAMINOPHEN 5; 325 MG/1; MG/1
1 TABLET ORAL
Status: DISCONTINUED | OUTPATIENT
Start: 2021-12-16 | End: 2021-12-16 | Stop reason: HOSPADM

## 2021-12-16 RX ORDER — MORPHINE SULFATE 10 MG/ML
2 INJECTION, SOLUTION INTRAMUSCULAR; INTRAVENOUS
Status: DISCONTINUED | OUTPATIENT
Start: 2021-12-16 | End: 2021-12-16 | Stop reason: HOSPADM

## 2021-12-16 RX ORDER — PROPOFOL 10 MG/ML
INJECTION, EMULSION INTRAVENOUS AS NEEDED
Status: DISCONTINUED | OUTPATIENT
Start: 2021-12-16 | End: 2021-12-16 | Stop reason: HOSPADM

## 2021-12-16 RX ORDER — SODIUM CHLORIDE 0.9 % (FLUSH) 0.9 %
5-40 SYRINGE (ML) INJECTION AS NEEDED
Status: DISCONTINUED | OUTPATIENT
Start: 2021-12-16 | End: 2021-12-16 | Stop reason: HOSPADM

## 2021-12-16 RX ORDER — DEXAMETHASONE SODIUM PHOSPHATE 4 MG/ML
INJECTION, SOLUTION INTRA-ARTICULAR; INTRALESIONAL; INTRAMUSCULAR; INTRAVENOUS; SOFT TISSUE AS NEEDED
Status: DISCONTINUED | OUTPATIENT
Start: 2021-12-16 | End: 2021-12-16 | Stop reason: HOSPADM

## 2021-12-16 RX ORDER — FENTANYL CITRATE 50 UG/ML
25 INJECTION, SOLUTION INTRAMUSCULAR; INTRAVENOUS
Status: DISCONTINUED | OUTPATIENT
Start: 2021-12-16 | End: 2021-12-16 | Stop reason: HOSPADM

## 2021-12-16 RX ORDER — PHENYLEPHRINE HCL IN 0.9% NACL 0.4MG/10ML
SYRINGE (ML) INTRAVENOUS
Status: DISCONTINUED | OUTPATIENT
Start: 2021-12-16 | End: 2021-12-16 | Stop reason: HOSPADM

## 2021-12-16 RX ORDER — GLYCOPYRROLATE 0.2 MG/ML
INJECTION INTRAMUSCULAR; INTRAVENOUS AS NEEDED
Status: DISCONTINUED | OUTPATIENT
Start: 2021-12-16 | End: 2021-12-16 | Stop reason: HOSPADM

## 2021-12-16 RX ORDER — SODIUM CHLORIDE 0.9 % (FLUSH) 0.9 %
5-40 SYRINGE (ML) INJECTION EVERY 8 HOURS
Status: DISCONTINUED | OUTPATIENT
Start: 2021-12-16 | End: 2021-12-16 | Stop reason: HOSPADM

## 2021-12-16 RX ORDER — DIPHENHYDRAMINE HYDROCHLORIDE 50 MG/ML
12.5 INJECTION, SOLUTION INTRAMUSCULAR; INTRAVENOUS AS NEEDED
Status: DISCONTINUED | OUTPATIENT
Start: 2021-12-16 | End: 2021-12-16 | Stop reason: HOSPADM

## 2021-12-16 RX ORDER — SODIUM CHLORIDE, SODIUM LACTATE, POTASSIUM CHLORIDE, CALCIUM CHLORIDE 600; 310; 30; 20 MG/100ML; MG/100ML; MG/100ML; MG/100ML
25 INJECTION, SOLUTION INTRAVENOUS CONTINUOUS
Status: DISCONTINUED | OUTPATIENT
Start: 2021-12-16 | End: 2021-12-16 | Stop reason: HOSPADM

## 2021-12-16 RX ORDER — GABAPENTIN 100 MG/1
100 CAPSULE ORAL 3 TIMES DAILY
Qty: 30 CAPSULE | Refills: 0 | Status: SHIPPED | OUTPATIENT
Start: 2021-12-16 | End: 2022-01-05

## 2021-12-16 RX ADMIN — DEXAMETHASONE SODIUM PHOSPHATE 4 MG: 4 INJECTION, SOLUTION INTRAMUSCULAR; INTRAVENOUS at 12:22

## 2021-12-16 RX ADMIN — FENTANYL CITRATE 25 MCG: 50 INJECTION, SOLUTION INTRAMUSCULAR; INTRAVENOUS at 12:39

## 2021-12-16 RX ADMIN — SODIUM CHLORIDE, POTASSIUM CHLORIDE, SODIUM LACTATE AND CALCIUM CHLORIDE 25 ML/HR: 600; 310; 30; 20 INJECTION, SOLUTION INTRAVENOUS at 09:22

## 2021-12-16 RX ADMIN — FENTANYL CITRATE 25 MCG: 50 INJECTION, SOLUTION INTRAMUSCULAR; INTRAVENOUS at 12:25

## 2021-12-16 RX ADMIN — WATER 2 G: 1 INJECTION INTRAMUSCULAR; INTRAVENOUS; SUBCUTANEOUS at 12:22

## 2021-12-16 RX ADMIN — GLYCOPYRROLATE 0.2 MG: 0.2 INJECTION, SOLUTION INTRAMUSCULAR; INTRAVENOUS at 12:44

## 2021-12-16 RX ADMIN — FENTANYL CITRATE 25 MCG: 50 INJECTION, SOLUTION INTRAMUSCULAR; INTRAVENOUS at 13:09

## 2021-12-16 RX ADMIN — ONDANSETRON HYDROCHLORIDE 4 MG: 2 INJECTION, SOLUTION INTRAMUSCULAR; INTRAVENOUS at 13:14

## 2021-12-16 RX ADMIN — Medication 20 MCG/MIN: at 12:29

## 2021-12-16 RX ADMIN — PROPOFOL 20 MG: 10 INJECTION, EMULSION INTRAVENOUS at 12:17

## 2021-12-16 RX ADMIN — PROPOFOL 80 MG: 10 INJECTION, EMULSION INTRAVENOUS at 12:16

## 2021-12-16 RX ADMIN — LIDOCAINE HYDROCHLORIDE 20 MG: 20 INJECTION, SOLUTION EPIDURAL; INFILTRATION; INTRACAUDAL; PERINEURAL at 12:16

## 2021-12-16 RX ADMIN — FENTANYL CITRATE 25 MCG: 50 INJECTION, SOLUTION INTRAMUSCULAR; INTRAVENOUS at 12:52

## 2021-12-16 NOTE — PERIOP NOTES
Arnett Opal  1945  [id number]    Situation:  Verbal report given from: JESSICA Powell RN and ELDA Benjamin CRNA  Procedure: Procedure(s):  RIGHT BREAST LUMPECTOMY WITH ULTRASOUND AND RIGHT BREAST SENTINEL NODE BIOPSY  right breast sentinal node biopsy    Background:    Preoperative diagnosis: RIGHT BREAST CANCER    Postoperative diagnosis: RIGHT BREAST CANCER    :  Dr. Megan Dumont    Assistant(s): Circ-1: Sandeep Eckert RN  Circ-2: Be Leblanc RN  Scrub Tech-1: Maci Chavarria  Surg Asst-1: Cris Reynoso    Specimens:   ID Type Source Tests Collected by Time Destination   1 : right axillary sentinal node #1 Preservative Lymph Node  Sharon Lynch MD 12/16/2021 1251 Pathology   2 : right axillary sentinal node #2 Preservative Lymph Node  Sharon Lynch MD 12/16/2021 1253 Pathology   3 : Right breast lumpectomy Preservative Breast  Sharon Lynch MD 12/16/2021 1301 Pathology   4 : Right breast lumpectomy inferior medial margin Preservative Breast  Sharon Lynch MD 12/16/2021 1303 Pathology       Assessment:  Intra-procedure medications         Anesthesia gave intra-procedure sedation and medications, see anesthesia flow sheet     Intravenous fluids: LR@ KVO     Vital signs stable       Recommendation:    Permission to share finding with  Paulie Hernández : yes

## 2021-12-16 NOTE — BRIEF OP NOTE
Brief Postoperative Note    Patient: Mik Longoria  YOB: 1945  MRN: [id number]    Date of Procedure: 12/16/2021     Pre-Op Diagnosis: RIGHT BREAST CANCER    Post-Op Diagnosis: Same as preoperative diagnosis. Procedure(s):  RIGHT BREAST LUMPECTOMY WITH ULTRASOUND AND RIGHT BREAST SENTINEL NODE BIOPSY  right breast sentinal node biopsy    Surgeon(s): Joyce Gunn MD    Surgical Assistant: Surg Asst-1: Braeden Santana    Anesthesia: General     Estimated Blood Loss (mL): Minimal    Complications: None    Specimens:   ID Type Source Tests Collected by Time Destination   1 : right axillary sentinal node #1 Preservative Lymph Node  Joyce Gunn MD 12/16/2021 1251 Pathology   2 : right axillary sentinal node #2 Preservative Lymph Node  Joyce Gunn MD 12/16/2021 1253 Pathology   3 : Right breast lumpectomy Preservative Breast  Joyce Gunn MD 12/16/2021 1301 Pathology   4 : Right breast lumpectomy inferior medial margin Preservative Breast  Joyce Gunn MD 12/16/2021 1303 Pathology        Implants:   Implant Name Type Inv.  Item Serial No.  Lot No. LRB No. Used Action   VeraForm Adaptable Tissue Marker   N/A [employer] -001 Right 1 Implanted       Drains: * No LDAs found *    Findings: sln sent for permanent    Electronically Signed by Cherelle Villalobos MD on 12/16/2021 at 1:23 PM  Dictated stat

## 2021-12-16 NOTE — OP NOTES
[facility]  OPERATIVE REPORT    Name:  Ronald Lopez  MR#:  [id number]  :  1945  ACCOUNT #:  [de-identified]  DATE OF SERVICE:  2021    PREOPERATIVE DIAGNOSIS:  Right breast cancer, upper outer quadrant. POSTOPERATIVE DIAGNOSIS:  Right breast cancer, upper outer quadrant. PROCEDURE PERFORMED:  Right breast ultrasound-guided lumpectomy, right deep axillary sentinel node biopsy. SURGEON:  Cody Peraza MD    ASSISTANT:  Meme Cheungr. ANESTHESIA:  General.    COMPLICATIONS:  None. SPECIMENS REMOVED:  1. Right axillary sentinel node, #1.  2.  Right axillary sentinel node, #2.  3.  Right breast lumpectomy. 4. Inferior margin. IMPLANTS:  VeraForm. ESTIMATED BLOOD LOSS:  Minimal.    DRAINS:  None. FINDINGS:  [place] lymph nodes sent for permanent. INDICATION FOR PROCEDURE:  This is a 28-year-old female with a right breast cancer at upper outer quadrant. She wished to have a lumpectomy and deep axillary sentinel node biopsy. PROCEDURE IN DETAIL:  The patient initially went to Nuclear Medicine where technetium-99 was injected in right breast.  She tolerated this well. She went to the preop holding area where surgical site was marked by surgeon and informed consent was obtained. She was taken to the operating room, laid in supine position where LMA anesthesia was induced. Right breast was prepped and draped in usual fashion and time-out was performed. Attention was turned to the right axilla where an inferior axillary hairline incision was made with 15-blade. Bovie cautery was used to dissect through the axillary fascia. Two sentinel nodes were identified and excised with LigaSure device. These were normal in gross size and appearance and sent for permanent pathology. Cavity was irrigated and 20 mL of local anesthetic was injected into the right axillary tissue and skin.   Axillary fascia was closed with interrupted 3-0 Vicryl and the skin with 4-0 subcuticular Monocryl. Attention was turned at the 10 o'clock in the right breast where the previously biopsied cancer was identified using ultrasound guidance. Curvilinear incision was made with a 15-blade. Bovie cautery was used to dissect down around the lesion all the way to the chest wall. This was excised, marked short stitch superior, long stitch lateral.  The MarginProbe device was plugged in and calibrated. All the six margins were assessed. For additional margins, please see above. Total intraop time was 2 minutes. Next, the cavity was irrigated. Bovie cautery was used to obtain hemostasis. VeraForm suture was used to align the perimeter of the lumpectomy cavity. The deeper tissues were closed with interrupted 2-0 Vicryl and skin with interrupted 3-0 Vicryl and 4-0 subcuticular Monocryl. Skin glue was placed on incision as well as a pressure dressing and a bra. All sponge, needle, and instrument counts were correct. The patient went to recovery in stable condition.         Dyllan Kurtz MD      [id number]  D:  12/16/2021 13:26  T:  12/16/2021 14:04  JOB #:  [id number]

## 2021-12-16 NOTE — ANESTHESIA PREPROCEDURE EVALUATION
Relevant Problems   CARDIOVASCULAR   (+) Hypertension   (+) PVCs (premature ventricular contractions)      ENDOCRINE   (+) Hypothyroidism       Anesthetic History   No history of anesthetic complications            Review of Systems / Medical History  Patient summary reviewed, nursing notes reviewed and pertinent labs reviewed    Pulmonary  Within defined limits                 Neuro/Psych         Psychiatric history    Comments: Anxiety  Memory deficit Cardiovascular    Hypertension        Dysrhythmias (On BB) : PVC  Hyperlipidemia    Exercise tolerance: >4 METS  Comments: Denies palpitations   GI/Hepatic/Renal  Within defined limits   GERD: well controlled           Endo/Other      Hypothyroidism: well controlled  Arthritis and cancer (left breast)     Other Findings            Physical Exam    Airway  Mallampati: III  TM Distance: 4 - 6 cm  Neck ROM: decreased range of motion   Mouth opening: Normal     Cardiovascular    Rhythm: regular  Rate: normal         Dental    Dentition: Caps/crowns  Comments:  Throughout mouth, intact   Pulmonary  Breath sounds clear to auscultation               Abdominal  GI exam deferred       Other Findings            Anesthetic Plan    ASA: 2  Anesthesia type: general          Induction: Intravenous  Anesthetic plan and risks discussed with: Patient      Took BB yesterday at 10 am

## 2021-12-16 NOTE — DISCHARGE INSTRUCTIONS
Discharge Instructions from Dr. Chino Montoya    · I will call you with the pathology results, typically within 1 week from today. · You may shower, but no hot tubs, swimming pools, or baths until your incision is healed. · No heavy lifting with the affected extremity (nothing greater than 5 pounds), and limit its use for the next 4-5 days. · You may use an ice pack for comfort for the next couple of days, but do not place ice directly on the skin. Rather, use a towel or clothing to serve as a barrier between skin and ice to prevent injury. .  · Follow medication instructions carefully. No aspirin, ibuprofen or aleve. May take tylenol  · Take gabapentin as scheduled for pain. If needed may also take tramadol for more severe pain. · Wear surgical bra for 24 hours, then remove. Wear supportive bra at all times. · You will have bruising and swelling  · Watch for signs of infection as listed below. · Redness  · Swelling  · Drainage from the incision or from your nipple that appears infected  · Fever over 101.5 degrees for consecutive readings, or over 99.5 if you are currently undergoing chemotherapy. · Call our office (number is below) for a follow-up appointment. · If you have any problems, our phone number is [phone]    TO PREVENT AN INFECTION      1. [address] YOUR HANDS     To prevent infection, good handwashing is the most important thing you or your caregiver can do.  Wash your hands with soap and water or use the hand  we gave you before you touch any wounds. 2. SHOWER     Use the antibacterial soap we gave you when you take a shower.  Shower with this soap until your wounds are healed.  To reach all areas of your body, you may need someone to help you.  Dont forget to clean your belly button with every shower. 3.  USE CLEAN SHEETS     Use freshly cleaned sheets on your bed after surgery.       To keep the surgery site clean, do not allow pets to sleep with you while your wound is still healing. 4. STOP SMOKING     Stop smoking, or at least cut back on smoking     Smoking slows your healing. 5.  CONTROL YOUR BLOOD SUGAR     High blood sugars slow wound healing.  If you are diabetic, control your blood sugar levels before and after your surgery. Dermabond Instructions    How to Care for Your Wound after Its Treated with DERMABOND* topical skin Adhesive  DERMABOND* Topical skin adhesive (2-octyl cyanoacrylate) is a sterile, liquid skin adhesive that holds wound edges together. The film will usually remain in place for 5 to 10 days, then naturally fall off your skin. The following will answer some of your questions and provide instructions for proper care for your wound while it is healing:  CHECK WOUND APPEARANCE   Some swelling, redness, and pain are common with all wounds and normally will go away as the wound heals. If swelling, redness, or pain increases or if the wound feels warm to the tough, contact a doctor. Also contact a doctor if the wound edges reopen or separate. REPLACE BANDAGES   If your wound is bandaged, keep the bandage dry.  Replace the dressing daily until the adhesive film has fallen off or if the bandage should become wet, unless otherwise instructed by your physician.  When changing the dressing, do not place tape directly over the DERMABOND* adhesive film, because removing the tape later may also remove the film. AVOID TOPICAL MEDICATIONS   Do not apply liquid or ointment medications or any other product to your wound while the DERMABOND* adhesive film is in place. These may loosen the film before your wound is healed. KEEP WOUND DRY AND PROTECTED   You may occasionally and briefly wet your wound in the shower or bath. Do not soak or scrub your wound, do not swim, and avoid periods of heavy perspiration until the DERMABOND* adhesive has naturally fallen off.   After showering or bathing, gently blot your wound dry with a soft towel. If a protective dressing is being used, apply a fresh, dry bandage, being sure to keep the tape off the DERMABOND* adhesive film.  Apply a clean, dry bandage over the wound if necessary to protect it.  Protect your wound from injury until the skin has had sufficient time to heal.   Do not scratch, rub, or pick at the DERMABOND* adhesive film. This may loosen the film before your wound is healed.  Protect the wound from prolonged exposure to sunlight or tanning lamps while the film is in place. If you have any questions or concerns about this product, please consult your doctor. *[employer]   DO NOT TAKE TYLENOL/ACETAMINOPHEN WITH PERCOCET, [address], [address] TAKE NARCOTIC PAIN MEDICATIONS WITH FOOD     Narcotics tend to be constipating, we suggest taking a stool softener such as Colace or Miralax (follow package instructions). DO NOT DRIVE WHILE TAKING NARCOTIC PAIN MEDICATIONS. DO NOT TAKE SLEEPING MEDICATIONS OR ANTIANXIETY MEDICATIONS WHILE TAKING NARCOTIC PAIN MEDICATIONS,  ESPECIALLY THE NIGHT OF ANESTHESIA! CPAP PATIENTS BE SURE TO WEAR MACHINE WHENEVER NAPPING OR SLEEPING! DISCHARGE SUMMARY from Nurse    The following personal items collected during your admission are returned to you:   Dental Appliance: Dental Appliances: None  Vision: Visual Aid: Glasses (PACU)  Hearing Aid:    Jewelry: Jewelry: None  Clothing: Clothing: With patient  Other Valuables: Other Valuables: Eyeglasses (PACU)  Valuables sent to safe:        PATIENT INSTRUCTIONS:    After General Anesthesia or Intravenous Sedation, for 24 hours or while taking prescription Narcotics:        Someone should be with you for the next 24 hours. For your own safety, a responsible adult must drive you home. · Limit your activities  · Recommended activity: Rest today, up with assistance today. Do not climb stairs or shower unattended for the next 24 hours.   · Please start with a soft bland diet and advance as tolerated (no nausea) to regular diet. · If you have a sore throat you should try the following: fluids, warm salt water gargles, or throat lozenges. If it does not improve after several days please follow up with your primary physician. · Do not drive and operate hazardous machinery  · Do not make important personal or business decisions  · Do  not drink alcoholic beverages  · If you have not urinated within 8 hours after discharge, please contact your surgeon on call. Report the following to your surgeon:  · Excessive pain, swelling, redness or odor of or around the surgical area  · Temperature over 100.5  · Nausea and vomiting lasting longer than 4 hours or if unable to take medications  · Any signs of decreased circulation or nerve impairment to extremity: change in color, persistent  numbness, tingling, coldness or increase pain      · You will receive a Post Operative Call from one of the Recovery Room Nurses on the day after your surgery to check on you. It is very important for us to know how you are recovering after your surgery. If you have an issue or need to speak with someone, please call your surgeon, do not wait for the post operative call. · You may receive an e-mail or letter in the mail from [employer] regarding your experience with us in the Ambulatory Surgery Unit. Your feedback is valuable to us and we appreciate your participation in the survey. · If the above instructions are not adequate or you are having problems after your surgery, call the physician at their office number. · We wish you a speedy recovery ? What to do at Home:      *  Please give a list of your current medications to your Primary Care Provider. *  Please update this list whenever your medications are discontinued, doses are      changed, or new medications (including over-the-counter products) are added.     *  Please carry medication information at all times in case of emergency situations. If you have not received your influenza and/or pneumococcal vaccine, please follow up with your primary care physician. The discharge information has been reviewed with the patient and caregiver. The patient and caregiver verbalized understanding.

## 2021-12-16 NOTE — PERIOP NOTES
Permission received to review discharge instructions and discuss private health information with stefanie Kee.

## 2021-12-16 NOTE — PERIOP NOTES
Resting with eyes closed. Easily aroused for incisions to be checked. Denies discomfort at this time. 1345 pt rolled on left side due to \"back bothering me\" No other complaints. 1350 D/C instructions reviewed with pt's  Vincent Sheets via phone. 1400 Patient easily aroused, HOB elevated. Placed on room air. Sipping apple juice. 1415 Awake, alert. No discomfort or nausea. 1435 Discharged to home via/wc,accompanied to car per RN. Skin warm and dry, awake and alert. Respirations even, unlabored. Pt and family members questions and concerns addressed prior to discharge. All belongings (glasses) with pt.

## 2021-12-16 NOTE — ANESTHESIA POSTPROCEDURE EVALUATION
Procedure(s):  RIGHT BREAST LUMPECTOMY WITH ULTRASOUND AND RIGHT BREAST SENTINEL NODE BIOPSY  right breast sentinal node biopsy. general    Anesthesia Post Evaluation      Multimodal analgesia: multimodal analgesia used between 6 hours prior to anesthesia start to PACU discharge  Patient location during evaluation: PACU  Patient participation: complete - patient participated  Level of consciousness: awake and alert  Pain score: 0  Airway patency: patent  Anesthetic complications: no  Cardiovascular status: acceptable  Respiratory status: acceptable  Hydration status: acceptable  Post anesthesia nausea and vomiting:  none  Final Post Anesthesia Temperature Assessment:  Normothermia (36.0-37.5 degrees C)      INITIAL Post-op Vital signs:   Vitals Value Taken Time   /74 12/16/21 1400   Temp 36.6 °C (97.8 °F) 12/16/21 1334   Pulse 75 12/16/21 1401   Resp 14 12/16/21 1401   SpO2 98 % 12/16/21 1401   Vitals shown include unvalidated device data.

## 2021-12-16 NOTE — H&P
History and Physical    HISTORY OF PRESENT ILLNESS  Ling Ferrera is a 78 y.o. female. HPI NEW patient consult referred by  Dr. Julee Gilbert for RIGHT breast invasive lobular carcinoma. Denies pain or changes to the breast.      Biopsy- 10/15/21 Grade 1, ER 99, CA 0, Ki 67 5%, Her2 equivocal, FISH negative         Family History:  Father- Colon cancer         Breast imaging-   Mammogram [facility] 10/6/21, BI-RADS 4C  [facility] Results (most recent):  Results from [place] encounter on 10/13/21     NEGIN POST BX IMAGING RT INCL CAD     Addendum 10/15/2021 11:50 AM  Addendum:     Addendum to ultrasound-guided right breast biopsy, performed on 10/13/2021     PATHOLOGY RESULTS: Invasive carcinoma with lobular features     RESULTS CONVEYED: Yes, by Dr. Jose Foreman, by telephone     ASSESSMENT: These results are concordant with the imaging findings.     RECOMMENDATIONS: Surgical consultation.     Narrative  INDICATION: Suspicious right breast mass     TECHNIQUE: The risks and benefits of the procedure were discussed with the  patient. Written consent was obtained. Preliminary ultrasound imaging of the  right breast again demonstrated a 7 mm hypoechoic, shadowing mass at the 10:00  position, 6 cm from the nipple . The patient was prepped and draped in sterile  fashion. 1% lidocaine was injected intradermally. 1% lidocaine with epinephrine  was injected locally within the breast. A small dermatotomy was made. A 9-gauge  Referlyiva vacuum-assisted biopsy needle was then advanced into the lesion  under ultrasound guidance. 6 core specimens were obtained under direct  ultrasound visualization. A biopsy clip was then deployed at the target. Pressure was applied locally at the biopsy site. The puncture site was then  dressed appropriately. The patient tolerated the procedure well. There were no  immediate complications.     Post procedure right breast digital mammography demonstrates no postbiopsy  complication.  A biopsy clip is present at the biopsy site on the ML view; it is  not visualized on the craniocaudad view due to posterior positioning.     Impression  Successful ultrasound guided, vacuum-assisted core needle biopsy of  right breast mass. Biopsy clip appears to be appropriately positioned. Pathology  results are pending.        Study Result     Narrative & Impression   INDICATION: Abnormal mammogram.  COMPARISON: Screening mammogram September 2021. [facility] COMPOSITION: There are scattered fibroglandular densities. .   TECHNIQUE: Standard 2D, CC and MLO spot and full field ML views of the right  breast were performed with digital technique and subjected to computer-aided  detection. Tomosynthesis of the right breast was performed in CC and MLO spot  and full field ML projections. [facility] FINDINGS:   The additional views confirm a spiculated mass in the upper outer quadrant. Ultrasonography of the 10:00 position demonstrates a angular hypoechoic  shadowing 5.9 x 5.5 x 4.7 mm wider than tall mass. Ultrasonography of the right  axilla demonstrates no abnormal lymph nodes. .  IMPRESSION  1. BI-RADS category 4C, suspicious. 2. Biopsy of the mass within the right breast is recommended. 3. She was informed. Dr. Los Rick office was called. 4. An addendum will be issued when prior exams become available.                     Review of Systems   HENT: Positive for hearing loss. Eyes: Positive for pain, discharge and redness. Cardiovascular: Positive for palpitations. Musculoskeletal: Positive for back pain, joint pain and neck pain. All other systems reviewed and are negative.        Physical Exam  Vitals and nursing note reviewed. Constitutional:       Appearance: She is well-developed. HENT:      Head: Normocephalic. Neck:      Thyroid: No thyromegaly. Cardiovascular:      Rate and Rhythm: Normal rate and regular rhythm. Heart sounds: Normal heart sounds.    Pulmonary:      Effort: Pulmonary effort is normal.      Breath sounds: Normal breath sounds. Chest:      Breasts: Breasts are symmetrical.         Right: No inverted nipple, mass, nipple discharge, skin change or tenderness. Left: No inverted nipple, mass, nipple discharge, skin change or tenderness. Abdominal:      Palpations: Abdomen is soft. Musculoskeletal:         General: Normal range of motion. Cervical back: Neck supple. Lymphadenopathy:      Cervical: No cervical adenopathy. Upper Body:      Right upper body: No supraclavicular, axillary or pectoral adenopathy. Left upper body: No supraclavicular, axillary or pectoral adenopathy. Skin:     General: Skin is warm and dry. Neurological:      Mental Status: She is alert and oriented to person, place, and time.       BREAST ULTRASOUND, Preop planning  Indication:preop planning  right Breast 10:00    Technique: The area was scanned using a high-frequency linear-array near-field transducer  Findings: 7 mm  irregular mass with dropout with clip   Impression: Biopsy site visible with ultrasound  Disposition:  Will schedule lumpectomy with sentinel lymph node biopsy after breast mri     ASSESSMENT and PLAN      ICD-10-CM ICD-9-CM     1. Malignant neoplasm of upper-inner quadrant of right breast in female, estrogen receptor positive ([facility] [address]  C50.211 174.2 MRI BREAST BI W WO CONT     Z17.0 V86.0        79 yo female with right breast T1 N0 ILC grade 1 ER + Pr neg her 2 eq  She is here with her   I have reviewed the imaging and pathology with her and she was given copies of these reports.     90 minutes were spent face-to-face with the patient during this encounter and 90% of that time was spent on counseling and coordination of care. 1. Discussed lumpectomy and radiation vs mastectomy. Discussed reconstruction. MRI ordered to see if patient is a candidate for a lumpectomy. 2. Discussed sentinel lymph node biopsy. 3. Discussed external beam radiation.     4. Discussed hormone therapy. 5. Discussed the possibility of chemotherapy.      Plan will be right us guided lumpectomy, sln biopsy   I will call hear after breast mri. They were happy with plan.

## 2021-12-17 ENCOUNTER — TELEPHONE (OUTPATIENT)
Dept: SURGERY | Age: 76
End: 2021-12-17

## 2021-12-17 NOTE — TELEPHONE ENCOUNTER
Called patient to check on her to see how she is doing. She is doing well. No questions. She was appreciative of phone call.

## 2021-12-23 ENCOUNTER — DOCUMENTATION ONLY (OUTPATIENT)
Dept: SURGERY | Age: 76
End: 2021-12-23

## 2021-12-30 DIAGNOSIS — E03.9 ACQUIRED HYPOTHYROIDISM: ICD-10-CM

## 2021-12-30 RX ORDER — LEVOTHYROXINE SODIUM 137 UG/1
TABLET ORAL
Qty: 90 TABLET | Refills: 3 | Status: SHIPPED | OUTPATIENT
Start: 2021-12-30 | End: 2022-05-10

## 2022-01-04 NOTE — PROGRESS NOTES
Donnie Zapata is a 78 y.o. female here for new patient appt for right breast cancer. Referred by Dr iLna Martinez  Pt had right lumpectomy on 12/16/21. Pt did talk to Dr Albert Allen on the phone. She should be starting radiation 3 weeks from this past Monday. Pt here with  who is a patient of ours. 1. Have you been to the ER, urgent care clinic since your last visit? Hospitalized since your last visit? New Pt    2. Have you seen or consulted any other health care providers outside of the [address] [place] [address] since your last visit? Include any pap smears or colon screening.  New Pt

## 2022-01-05 ENCOUNTER — NURSE NAVIGATOR (OUTPATIENT)
Dept: CASE MANAGEMENT | Age: 77
End: 2022-01-05

## 2022-01-05 ENCOUNTER — OFFICE VISIT (OUTPATIENT)
Dept: ONCOLOGY | Age: 77
End: 2022-01-05
Payer: MEDICARE

## 2022-01-05 ENCOUNTER — OFFICE VISIT (OUTPATIENT)
Dept: SURGERY | Age: 77
End: 2022-01-05
Payer: MEDICARE

## 2022-01-05 ENCOUNTER — DOCUMENTATION ONLY (OUTPATIENT)
Dept: ONCOLOGY | Age: 77
End: 2022-01-05

## 2022-01-05 VITALS
WEIGHT: 125 LBS | OXYGEN SATURATION: 96 % | HEIGHT: 61 IN | DIASTOLIC BLOOD PRESSURE: 72 MMHG | TEMPERATURE: 98.4 F | BODY MASS INDEX: 23.6 KG/M2 | SYSTOLIC BLOOD PRESSURE: 123 MMHG | HEART RATE: 61 BPM

## 2022-01-05 DIAGNOSIS — Z17.0 MALIGNANT NEOPLASM OF UPPER-OUTER QUADRANT OF RIGHT BREAST IN FEMALE, ESTROGEN RECEPTOR POSITIVE (HCC): Primary | ICD-10-CM

## 2022-01-05 DIAGNOSIS — C50.411 MALIGNANT NEOPLASM OF UPPER-OUTER QUADRANT OF RIGHT BREAST IN FEMALE, ESTROGEN RECEPTOR POSITIVE (HCC): Primary | ICD-10-CM

## 2022-01-05 PROCEDURE — 3017F COLORECTAL CA SCREEN DOC REV: CPT | Performed by: INTERNAL MEDICINE

## 2022-01-05 PROCEDURE — 1090F PRES/ABSN URINE INCON ASSESS: CPT | Performed by: INTERNAL MEDICINE

## 2022-01-05 PROCEDURE — G0463 HOSPITAL OUTPT CLINIC VISIT: HCPCS | Performed by: INTERNAL MEDICINE

## 2022-01-05 PROCEDURE — G9899 SCRN MAM PERF RSLTS DOC: HCPCS | Performed by: INTERNAL MEDICINE

## 2022-01-05 PROCEDURE — G8536 NO DOC ELDER MAL SCRN: HCPCS | Performed by: INTERNAL MEDICINE

## 2022-01-05 PROCEDURE — G8752 SYS BP LESS 140: HCPCS | Performed by: INTERNAL MEDICINE

## 2022-01-05 PROCEDURE — G8420 CALC BMI NORM PARAMETERS: HCPCS | Performed by: INTERNAL MEDICINE

## 2022-01-05 PROCEDURE — G8754 DIAS BP LESS 90: HCPCS | Performed by: INTERNAL MEDICINE

## 2022-01-05 PROCEDURE — G8432 DEP SCR NOT DOC, RNG: HCPCS | Performed by: INTERNAL MEDICINE

## 2022-01-05 PROCEDURE — 99205 OFFICE O/P NEW HI 60 MIN: CPT | Performed by: INTERNAL MEDICINE

## 2022-01-05 PROCEDURE — 99024 POSTOP FOLLOW-UP VISIT: CPT | Performed by: SURGERY

## 2022-01-05 PROCEDURE — G8427 DOCREV CUR MEDS BY ELIG CLIN: HCPCS | Performed by: INTERNAL MEDICINE

## 2022-01-05 PROCEDURE — 1101F PT FALLS ASSESS-DOCD LE1/YR: CPT | Performed by: INTERNAL MEDICINE

## 2022-01-05 RX ORDER — LETROZOLE 2.5 MG/1
2.5 TABLET, FILM COATED ORAL DAILY
Qty: 90 TABLET | Refills: 2 | Status: SHIPPED | OUTPATIENT
Start: 2022-01-05 | End: 2022-09-14

## 2022-01-05 NOTE — PROGRESS NOTES
[address] [facility] [address], [address] [facility] [place], [address] [place] [address]  [phone]      Oncology Note        Patient: Brennon Royal MRN: [id number]  SSN: [id number]    YOB: 1945  Age: 78 y.o. Sex: female      Subjective:      Brennon Royal is a 78 y.o. female who I am seeing for a new diagnosis of right sided invasive lobular carcinoma. She underwent a routine screening mammogram. An abnormal mammogram led to a biopsy which revealed invasive lobular carcinoma. She then underwent a right breast lumpectomy 12/16/2021. She is here to discuss the role of adjuvant treatment.        Review of Systems:    Constitutional: negative  Eyes: negative  Ears, Nose, Mouth, Throat, and Face: negative  Respiratory: negative  Cardiovascular: negative  Gastrointestinal: negative  Genitourinary:negative  Integument/Breast: negative  Hematologic/Lymphatic: negative  Musculoskeletal:negative  Neurological: negative        Past Medical History:   Diagnosis Date    Anxiety     Arrhythmia     Arthritis     Breast cancer ([facility] [address]     left side     GERD (gastroesophageal reflux disease)     High cholesterol     HTN (hypertension)     Hypothyroid     Memory deficit     Shingles outbreak      Past Surgical History:   Procedure Laterality Date    COLONOSCOPY N/A 6/7/2019    COLONOSCOPY performed by Jasbir Marti MD at [facility] ENDOSCOPY    HX BREAST LUMPECTOMY Right 12/16/2021    RIGHT BREAST LUMPECTOMY WITH ULTRASOUND AND RIGHT BREAST SENTINEL NODE BIOPSY performed by Brandon Stewart MD at [facility] AMBULATORY OR    HX GYN      hysterectomy    HX GYN      vaginal birth x 1      Family History   Problem Relation Age of Onset    Hypertension Sister     Stroke Mother     Cancer Father      Social History     Tobacco Use    Smoking status: Never Smoker    Smokeless tobacco: Never Used   Substance Use Topics    Alcohol use: No      Prior to Admission medications    Medication Sig Start Date End Date Taking? Authorizing Provider   fexofenadine HCl (ALLEGRA PO) Take  by mouth. BID   Yes Provider, Historical   levothyroxine (SYNTHROID) 137 mcg tablet TAKE 1 TABLET DAILY BEFORE BREAKFAST 12/30/21  Yes Shira Cristobal MD   atorvastatin (LIPITOR) 40 mg tablet TAKE 1 TABLET NIGHTLY FOR CHOLESTEROL 10/27/21  Yes Shira Cristobal MD   atenoloL (TENORMIN) 25 mg tablet TAKE 2 TABLETS DAILY 10/15/21  Yes Shira Cristobal MD   pantoprazole (PROTONIX) 40 mg tablet Take 1 Tablet by mouth daily. 8/20/21  Yes Shira Cristobal MD   hydroCHLOROthiazide (HYDRODIURIL) 12.5 mg tablet TAKE 1 TABLET DAILY 8/9/21  Yes Nancy Ann MD   alendronate (FOSAMAX) 70 mg tablet TAKE 1 TABLET EVERY 7 DAYS 2/16/21  Yes Shira Cristobal MD   lutein 20 mg tab Take 20 mg by mouth daily. Yes Provider, Historical   cholecalciferol, vitamin d3, 400 unit cap Take  by mouth. Yes Provider, Historical   gabapentin (NEURONTIN) 100 mg capsule Take 1 Capsule by mouth three (3) times daily. Max Daily Amount: 300 mg. Patient not taking: Reported on 1/5/2022 12/16/21   Trace Ragland MD   loratadine (CLARITIN) 10 mg tablet Take 10 mg by mouth. Patient not taking: Reported on 1/5/2022    Provider, Historical   fluticasone (FLONASE) 50 mcg/actuation nasal spray 1 spray each nostril twice a day  Patient not taking: Reported on 1/5/2022 10/13/14   Kaia Plasencia MD              Allergies   Allergen Reactions    Pneumococcal 23-Roxanne Ps Vaccine Swelling           Objective:     Vitals:    01/05/22 1337   BP: 123/72   Pulse: 61   Temp: 98.4 °F (36.9 °C)   TempSrc: Temporal   SpO2: 96%   Weight: 125 lb (56.7 kg)   Height: 5' 1\" (1.549 m)            Physical Exam:    GENERAL: alert, cooperative, no distress, appears stated age  EYE: conjunctivae/corneas clear.  PERRL, EOM's intact  LYMPHATIC: Cervical, supraclavicular, and axillary nodes normal.   THROAT & NECK: normal and no erythema or exudates noted. LUNG: clear to auscultation bilaterally  HEART: regular rate and rhythm, S1, S2 normal, no murmur, click, rub or gallop  ABDOMEN: soft, non-tender. Bowel sounds normal. No masses,  no organomegaly  EXTREMITIES:  extremities normal, atraumatic, no cyanosis or edema  SKIN: Normal.  NEUROLOGIC: AOx3. Gait normal. Reflexes and motor strength normal and symmetric. Cranial nerves 2-12 and sensation grossly intact. Assessment:     1. Right breast carcinoma:  T1c N0 (Stage I) invasive lobular carcinoma, Tumor size 15 mm, LN -ve, grade 1, ER 99%, IL 0%, Her 2 2+, FISH -ve. DCIS present. ECOG PS 0  Intent of Treatment - curative  Prognosis - excellent    S/P right breast lumpectomy 12/15/2021    Patient sent for consideration of adjuvant therapy. I spent significant time in explaining the rationale of adjuvant therapy is to reduce the risk of recurrence and improve the chances of cure. The risk of recurrence in the next 5 years is around 10-15%. Since the tumor is small, LN -ve and highly ER+ve, she would not benefit from adjuvant chemotherapy and thus I did not offer her this therapy. She will however benefit from adjuvant hormonal therapy. Adjuvant Aromatase inhibitor would reduce the risk of recurrence by 50% on an average. I counseled the patient regarding the hormonal therapy. Discussions included side-effect and benefit of hormonal therapy. She understood the expected side-effect which includes hot flashes, myalgias/arthralgias, osteopenia/osteoporosis with aromatase inhibitor. She agrees to take Letrozole. She understands the alternative to this is observation alone. I spent 65 minutes with the patient in a face-to-face encounter. I explained her the stage of the disease, pathophysiology of the disease and the treatment approaches. I answered all her questions. More than 50% of the time was utilized in education, counseling and co-ordination of care. Plan:       1. Complete adjuvant radiation to the right breast  2. Start Letrozole after completion of adjuvant radiation. 3. Return in 3 months      Signed By: Kaitlin Srivastava MD     January 5, 2022           CC. Domingo Cadet MD  CC.  Marya Troncoso MD

## 2022-01-05 NOTE — PROGRESS NOTES
Oncology Social Work  Psychosocial Assessment    Reason for Assessment:      [] Social Work Referral [x] Initial Assessment [x] New Diagnosis [] Other    Advance Care Planning:  Advance Care Planning 12/16/2021   Confirm Advance Directive Yes, not on file       Sources of Information:    [x]Patient  [x]Family  [x]Staff  [x]Medical Record    Mental Status:    [x]Alert  []Lethargic  []Unresponsive   [] Unable to assess   Oriented to:  [x]Person  [x]Place  [x]Time  []Situation      Relationship Status:  []Single  [x]  []Significant Other/Life Partner  []  []  []    Living Circumstances:  []Lives Alone  [x]Family/Significant Other in Household  []Roommates  []Children in the Home  []Paid Caregivers  []Assisted Living Facility/Group Home  []Skilled [address]  []Homeless  []Incarcerated  []Environmental/Care Concerns  []Other:    Employment Status:  []Employed Full-time []Employed Part-time []Homemaker  [x] Retired [] Short-Term Disability [] [facility]  [] Unemployed     Barriers to Learning:    []Language  []Developmental  []Cognitive  []Altered Mental Status  []Visual/Hearing Impairment  []Unable to Read/Write  []Motivational  [] Challenges Understanding Medical Jargon [x]No Barriers Identified      Financial/Legal Concerns:    []Uninsured  [x]Limited Income/Resources  []Non-Citizen  []Food Insecurity [x]No Concerns Identified   []Other:    Sikh/Spiritual/Existential:  Does patient have any spiritual or Baptism beliefs? [x] Yes [] No  Is the patient involved in a spiritual, margaret or Baptism community?  [x] Yes [] No  Patient expressing spiritual/existential angst? [x] Yes [] No  Notes:    Support System:    Identified Support Person/Group:  []Strong  [x]Fair  []Limited    Coping with Illness:   [x]  Coping Well  [] Challenges Coping with Serious Illness [] Situational Depression [] Situational Anxiety [] Anticipatory Grief  [] Recent Loss [] Caregiver Leighton    Narrative:  Met with patient  and her  Ariella Macedo, who is a patient of ours also,  to introduce social work navigator role and supports. Couple have been  for 26 years on Dec 9th. Pt has 1 daughter, Arleth Page, who lives in [place]. Pt used to work for the [employer]. Pt has stage 1 and will be receiving radiation and hormonal treatment. Couple belong to [facility] (over close to [facility]) - it's a 1 hour drive there which seems to be getting to be too much. Plan:   1. Introduce self and role of the  in the [employer]. 2. Informed the patient of the [facility] and available resources there. 3. Continue to meet with the patient when he returns to the clinic for ongoing assessment of the patient's adjustment to his diagnosis and treatment.     4. Ongoing psychosocial support as desired by patient    Referral:  Complementary therapies referral  Supervisee in Social Work

## 2022-01-05 NOTE — PROGRESS NOTES
HISTORY OF PRESENT ILLNESS  Dnonie Zapata is a 78 y.o. female. HPI ESTABLISHED patient here for RIGHT breast invasive lobular carcinoma, POD #20. RIGHT breast lumpectomy w/snbx  Patient reports not having pain just a little sore. ER 99, WY 0, Ki 67 5%, Her2 equivocal, FISH negative     Family History:  Father- Colon cancer         Breast imaging-   Mammogram [facility] 10/6/21, BI-RADS 4C    [facility] Results (most recent):  Results from [place] encounter on 11/18/21    [facility] POST BX IMAGING RT INCL CAD    Addendum 11/19/2021  3:00 PM  Addendum: Addendum to the right breast biopsy:    Findings: Pathology of the right breast lesion is consistent with benign breast  tissue . The imaging findings are concordant with the histology results. Recommend appropriate action, given newly diagnosed right breast cancer . The  patient was contacted by staff of the [address] [place] [address]. Patient is under the care of Dr. Jose Buck. Narrative  STUDY:  ULTRASOUND-GUIDED CORE NEEDLE BIOPSY OF THE RIGHT BREAST    INDICATION: 68-year-old female with newly diagnosed right breast cancer, status  post MRI and ultrasound evaluation, demonstrating an additional subcentimeter  lesion in the right breast, presenting for biopsy. PROCEDURE:    The risks and benefits of the procedure were explained to the patient, questions  were answered, and informed consent was obtained. The subcentimeter mass like area at 9 o'clock in the lateral right breast was  localized with ultrasound. The breast was prepped in the usual sterile manner. Following the administration of a local anesthetic and dermatotomy, and using  ultrasound guidance,  a 12 gauge vacuum-assisted Atec needle was advanced to the  lesion, and 4 cores were obtained. Pre and post-fire images confirmed accurate  needle trajectory. A metallic clip was placed at the biopsy site.   Post-biopsy  digital mammogram confirms the presence of the clip at the intended biopsy site. The patient tolerated the procedure well without complication. Final pathology is pending. Impression  Successful ultrasound-guided biopsy yielding cores submitted for histologic  analysis. A clip was placed at the biopsy site. Post-biopsy digital mammogram  confirms the presence of the clip at the intended biopsy site. Further  recommendations will be based on final pathology results. Review of Systems   All other systems reviewed and are negative. Physical Exam  Vitals and nursing note reviewed.    Chest:          Comments: Right breast incisions healing well         ASSESSMENT and PLAN    ICD-10-CM ICD-9-CM    1. Malignant neoplasm of upper-outer quadrant of right breast in female, estrogen receptor positive (HCC)  C50.411 174.4     Z17.0 V86.0      - T1 n0 Right breast ILC  Healing well  Rad onc, med onc  F/u in 4-6 months with np

## 2022-01-05 NOTE — PATIENT INSTRUCTIONS

## 2022-01-05 NOTE — PROGRESS NOTES
[address]  Breast Navigator Encounter    Name:    Tom Thurston  Age:    78 y.o. Diagnosis:   RIGHT breast cancer    Interdisciplinary Team:  Med-Onc:    Surg-Onc:    Dr. Ngoc Goldsmith:    Dr. Cesar Resendiz:    :     Nurse Navigator:  Jaswinder Mendez RN, BSN, [facility]      Encounter type:  []Patient Initiated  [x]Navigator Follow-up []Pre-op  []Post-op  []Check-in Prior to First Treatment []Treatment Modality Change  []Survivorship Transition []Other:       Narrative:    Saw patient briefly while she was in the office today. She has done great post-op, has no complaints at all. Already had a virtual visit with Dr. Velasquez Montgomery and will proceed with XRT. I will continue to follow her. Referrals/Handouts:   Provided business card.             Jaswinder Mendez RN, BSN, [facility]  Oncology Breast Navigator     [address] [place] [address]   [address] [facility], [facility], [place] [address].  W: [phone]  F: [phone]  [email or web address]  Good Help to Those in [facility]

## 2022-01-05 NOTE — LETTER
1/5/2022    Patient: Wicho Owusu   YOB: 1945   Date of Visit: 1/5/2022     Renetta Rodriguez MD  [address] [place]  [address] [facility] [address]    Dear Renetta Rodriguez MD,      Thank you for referring Ms. Paige Chopra to [address] [place] [address] for evaluation. My notes for this consultation are attached. If you have questions, please do not hesitate to call me. I look forward to following your patient along with you.       Sincerely,    Blanca Agustin MD

## 2022-01-06 ENCOUNTER — OFFICE VISIT (OUTPATIENT)
Dept: FAMILY MEDICINE CLINIC | Age: 77
End: 2022-01-06
Payer: MEDICARE

## 2022-01-06 VITALS
HEIGHT: 61 IN | HEART RATE: 63 BPM | TEMPERATURE: 98 F | BODY MASS INDEX: 23.6 KG/M2 | DIASTOLIC BLOOD PRESSURE: 88 MMHG | WEIGHT: 125 LBS | RESPIRATION RATE: 17 BRPM | SYSTOLIC BLOOD PRESSURE: 138 MMHG | OXYGEN SATURATION: 95 %

## 2022-01-06 DIAGNOSIS — R41.3 MEMORY CHANGE: Primary | ICD-10-CM

## 2022-01-06 DIAGNOSIS — R45.86 MOOD SWINGS: ICD-10-CM

## 2022-01-06 DIAGNOSIS — F41.9 ANXIETY: ICD-10-CM

## 2022-01-06 DIAGNOSIS — R40.4 EPISODES OF STARING: ICD-10-CM

## 2022-01-06 DIAGNOSIS — C50.919 INVASIVE LOBULAR CARCINOMA OF BREAST IN FEMALE (HCC): ICD-10-CM

## 2022-01-06 DIAGNOSIS — Z86.59 HISTORY OF OCD (OBSESSIVE COMPULSIVE DISORDER): ICD-10-CM

## 2022-01-06 PROCEDURE — G8427 DOCREV CUR MEDS BY ELIG CLIN: HCPCS | Performed by: NURSE PRACTITIONER

## 2022-01-06 PROCEDURE — 3017F COLORECTAL CA SCREEN DOC REV: CPT | Performed by: NURSE PRACTITIONER

## 2022-01-06 PROCEDURE — G8754 DIAS BP LESS 90: HCPCS | Performed by: NURSE PRACTITIONER

## 2022-01-06 PROCEDURE — G8752 SYS BP LESS 140: HCPCS | Performed by: NURSE PRACTITIONER

## 2022-01-06 PROCEDURE — G0463 HOSPITAL OUTPT CLINIC VISIT: HCPCS | Performed by: NURSE PRACTITIONER

## 2022-01-06 PROCEDURE — G8536 NO DOC ELDER MAL SCRN: HCPCS | Performed by: NURSE PRACTITIONER

## 2022-01-06 PROCEDURE — G8420 CALC BMI NORM PARAMETERS: HCPCS | Performed by: NURSE PRACTITIONER

## 2022-01-06 PROCEDURE — 1090F PRES/ABSN URINE INCON ASSESS: CPT | Performed by: NURSE PRACTITIONER

## 2022-01-06 PROCEDURE — G9899 SCRN MAM PERF RSLTS DOC: HCPCS | Performed by: NURSE PRACTITIONER

## 2022-01-06 PROCEDURE — 1101F PT FALLS ASSESS-DOCD LE1/YR: CPT | Performed by: NURSE PRACTITIONER

## 2022-01-06 PROCEDURE — G8432 DEP SCR NOT DOC, RNG: HCPCS | Performed by: NURSE PRACTITIONER

## 2022-01-06 PROCEDURE — 99214 OFFICE O/P EST MOD 30 MIN: CPT | Performed by: NURSE PRACTITIONER

## 2022-01-06 RX ORDER — ESCITALOPRAM OXALATE 5 MG/1
2.5 TABLET ORAL DAILY
Qty: 30 TABLET | Refills: 1 | Status: SHIPPED | OUTPATIENT
Start: 2022-01-06 | End: 2022-02-08 | Stop reason: SDUPTHER

## 2022-01-06 NOTE — PROGRESS NOTES
Subjective:     Chief Complaint   Patient presents with    Anxiety        HPI:  Belkis Pelaez is a 78 y.o. female here for complaints of anxiety. Patient is currently followed by Oncology and Breast surgery, diagnosed with right breast invasive lobular carcinoma. S/p lumpectomy 12/16/21  Currently undergoing radiation therapy. Her  and herself discussed anxiety with Dr Robbie Lara at her appointment on 11/30/21, apparently noted primarily by the family and not the patient. At that time, they decided that her anxiety was likely a normal reaction to her recent breast cancer diagnosis and they decided that she would treat her anxiety non-medicinally    says that patient's sister notes that her symptoms have been ongoing for couple years (difficulty recalling where things are or if something was not in its place she gets upset and \"shuts down, like stares off into space\"    Patient says that she does not feel that her anxiety is worsening. She does agree that she feels upset when she cannot remember something or if things are not in their place. She also realizes that she may have some OCD tendencies, feels that her house is never clean enough and she feels pressured (by herself) to have her house spotless and gets upset when she cannot have things in order. She says that she is sleeping well. She denies depression, SI/HI. She says that \"everyone else thinks that I am depressed or anxious or whatever they think but I dont feel these things. I am just taking it in and dealing with it\"       says he did reach out and make appointment with neurology but appointment is not until May. She has never done anything odd. She does not forget important dates, does not forget where she is going when she is going somewhere. No hospital, ER or specialist visits since last primary care visit except as noted above.     Past Medical History:   Diagnosis Date    Anxiety     Arrhythmia     Arthritis  Breast cancer ([facility] [address]     left side     GERD (gastroesophageal reflux disease)     High cholesterol     HTN (hypertension)     Hypothyroid     Memory deficit     Shingles outbreak        Social History     Tobacco Use    Smoking status: Never Smoker    Smokeless tobacco: Never Used   Vaping Use    Vaping Use: Never used   Substance Use Topics    Alcohol use: No    Drug use: Never       Outpatient Medications Marked as Taking for the 1/6/22 encounter (Office Visit) with Brittanie Floyd NP   Medication Sig Dispense Refill    fexofenadine HCl (ALLEGRA PO) Take  by mouth. BID      letrozole (FEMARA) 2.5 mg tablet Take 1 Tablet by mouth daily. 90 Tablet 2    levothyroxine (SYNTHROID) 137 mcg tablet TAKE 1 TABLET DAILY BEFORE BREAKFAST 90 Tablet 3    atorvastatin (LIPITOR) 40 mg tablet TAKE 1 TABLET NIGHTLY FOR CHOLESTEROL 90 Tablet 3    atenoloL (TENORMIN) 25 mg tablet TAKE 2 TABLETS DAILY 180 Tablet 3    pantoprazole (PROTONIX) 40 mg tablet Take 1 Tablet by mouth daily. 90 Tablet 1    hydroCHLOROthiazide (HYDRODIURIL) 12.5 mg tablet TAKE 1 TABLET DAILY 90 Tablet 3    alendronate (FOSAMAX) 70 mg tablet TAKE 1 TABLET EVERY 7 DAYS 12 Tab 3    lutein 20 mg tab Take 20 mg by mouth daily.  cholecalciferol, vitamin d3, 400 unit cap Take  by mouth.          Allergies   Allergen Reactions    Pneumococcal 23-Roxanne Ps Vaccine Swelling       Health Maintenance reviewed      ROS:  Gen: no fatigue, no fever, no chills, no unexplained weight loss or weight gain  Eyes: no excessive tearing, itching, or discharge  Nose: no rhinorrhea, no sinus pain  Mouth: no oral lesions, no sore throat, no difficulty swallowing  Resp: no shortness of breath, no wheezing, no cough  CV: no chest pain, no orthopnea, no paroxysmal nocturnal dyspnea, no lower extremity edema, no palpitations  Abd: no nausea, no heartburn, no diarrhea, no constipation, no abdominal pain  Neuro: no headaches, no syncope or presyncopal episodes  Endo: no polyuria, no polydipsia. : no hematuria, no dysuria, no frequency, no incontinence  Heme: no lymphadenopathy, no easy bruising or bleeding, no night sweats  MSK: no joint pain or swelling    PE:  Visit Vitals  /88 (BP 1 Location: Right arm, BP Patient Position: Sitting, BP Cuff Size: Adult)   Pulse 63   Temp 98 °F (36.7 °C) (Temporal)   Resp 17   Ht 5' 1\" (1.549 m)   Wt 125 lb (56.7 kg)   LMP 01/06/1996 (Exact Date)   SpO2 95%   BMI 23.62 kg/m²     Gen: alert, oriented, no acute distress  Head: normocephalic, atraumatic  Ears: external auditory canals clear, TMs without erythema or effusion  Eyes: pupils equal round reactive to light, sclera clear, conjunctiva clear  Oral: moist mucus membranes, no oral lesions, no pharyngeal inflammation or exudate  Neck: symmetric normal sized thyroid, no carotid bruits, no jugular vein distention  Resp: no increase work of breathing, lungs clear to ausculation bilaterally, no wheezing, rales or rhonchi  CV: S1, S2 normal.  No murmurs, rubs, or gallops. Abd: soft, not tender, not distended. No hepatosplenomegaly. Normal bowel sounds. No hernias. No abdominal or renal bruits. Neuro: cranial nerves intact, normal strength and movement in all extremities, and sensation intact and symmetric.   Skin: no lesion or rash  Extremities: no cyanosis or edema        Mini Mental State Exam 1/6/2022   What is the Year 1   What is the Season 1   What is the Date 1   What is the Day 1   What is the Month 1   Where are we State 1   Where are we Country 1   Where are we [facility] [place] or [place] 1   Where are we Floor 1   Name three objects, then ask the patient to say them 3   Serial sevens Subtract 7 from 100 in increments 3   Ask for the three objects repeated above 3   Name a pencil 1   Name a watch 1   Have the patient repeat this phrase \"No ifs, ands, or buts\" 1   Three stage command: Take the paper in your right hand 1   Fold the paper in half 1   Put the paper on the floor 1   Read and obey the following: CLOSE YOUR EYES 1   Have the patient write a sentence 1   Have the patient copy a figure 1   Mini Mental Score 28   Some recent data might be hidden     . No results found for this visit on 01/06/22. Assessment/Plan:  Differential diagnosis and treatment options reviewed with patient who is in agreement with treatment plan as outlined below. ICD-10-CM ICD-9-CM    1. Memory change  R41.3 780.93 CT HEAD WO CONT      CTA HEAD NECK W CONT      escitalopram oxalate (LEXAPRO) 5 mg tablet   2. Invasive lobular carcinoma of breast in female [facility])  C50.919 174.9 CT HEAD WO CONT      CTA HEAD NECK W CONT   3. Episodes of staring  R40.4 780.02 CTA HEAD NECK W CONT      escitalopram oxalate (LEXAPRO) 5 mg tablet   4. Mood swings  R45.86 799.24 CT HEAD WO CONT      CTA HEAD NECK W CONT      escitalopram oxalate (LEXAPRO) 5 mg tablet   5. Anxiety  F41.9 300.00 escitalopram oxalate (LEXAPRO) 5 mg tablet   6. History of OCD (obsessive compulsive disorder)  Z86.59 V11.2 escitalopram oxalate (LEXAPRO) 5 mg tablet     MME is pretty normal.  Did discuss early onset dementia as a possibility. Encouraged memory exercises at home  Difficult to determine if memory/cognition decline is causing anxiety or if anxiety is causing memory decline. She does not particularly think that she has depression or anxiety but does admit that she feels overwhelmed often when she cannot do the things that she used to be able to do. Will order imaging of her head, given recent diagnosis of cancer and family concern that her memory and moods are changing and she has had some \"staring\" episodes. Has neuro appointment but not for few months. Will trial low dose lexapro, may help with anxiety and her OCD symptoms. Discussed expected benefits vs possible side effects of SSRIs. Explained maximal effect may not be noted for 6 weeks.   Discussed possibility of increased suicidal tendencies during onset of action. Patient contracts for safety and can identify an accessible social support network. Patient underdstands that medication is more effective when partnered with counseling. Follow up appointment in 1 month or sooner if needed     Encouraged patient to work to implement changes including diet high in raw fruits and vegetables, lean protein and good fats. Limit refined, processed carbohydrates and sugar. Encouraged regular exercise. Recommended regular cardiovascular exercise 3-6 times per week for 30-60 minutes daily. I have discussed the diagnosis with the patient and the intended plan as seen in the above orders. The patient has received an after-visit summary and questions were answered concerning future plans. I have discussed medication side effects and warnings with the patient as well. The patient verbalizes understanding and agreement with the plan.

## 2022-01-06 NOTE — PROGRESS NOTES
Identified pt with two pt identifiers(name and ). Reviewed record in preparation for visit and have obtained necessary documentation. Chief Complaint   Patient presents with    Anxiety        Vitals:    22 1343   BP: 138/88   Pulse: 63   Resp: 17   Temp: 98 °F (36.7 °C)   TempSrc: Temporal   SpO2: 95%   Weight: 125 lb (56.7 kg)   Height: 5' 1\" (1.549 m)   PainSc:   0 - No pain   LMP: 1996       Health Maintenance Due   Topic    DTaP/Tdap/Td series (1 - Tdap)       Coordination of Care Questionnaire:  :   1) Have you been to an emergency room, urgent care, or hospitalized since your last visit? If yes, where when, and reason for visit? no      2. Have seen or consulted any other health care provider since your last visit? If yes, where when, and reason for visit? NO      Patient is accompanied by ,  I have received verbal consent from Shira Abdalla to discuss any/all medical information while they are present in the room.

## 2022-01-06 NOTE — PATIENT INSTRUCTIONS
Learning About Dementia  What is dementia? We all forget things as we get older. Many older people have a slight loss of memory that does not affect their daily lives. But memory loss that gets worse may mean that you have dementia. Dementia is a loss of mental skills that affects your daily life. It can cause problems with memory, problem-solving, and learning. It also can cause problems with thinking and planning. Dementia usually gets worse over time. But how quickly it gets worse is different for each person. Some people stay the same for years. Others lose skills quickly. Your chances of having dementia rise as you get older. But this doesn't mean that everyone will get it. How is dementia diagnosed? To diagnose dementia, your doctor will:  · Do a physical exam.  · Ask questions about recent and past illnesses and life events. The doctor will want to talk to a close family member to check details. · Ask you to do some simple things that test your memory and other mental skills. Your doctor may ask you to tell what day and year it is, repeat a series of words, or draw a clock face. The doctor may do tests to look for a cause that can be treated. For example, you might have blood tests to check your thyroid or to look for an infection. You might also have a test that shows a picture of your brain, like an MRI or a CT scan. These tests can help your doctor find a tumor or brain injury. Knowing the type of dementia a person has can help the doctor prescribe medicines or other treatments. What are the symptoms? Usually the first symptom of dementia is memory loss. Often the person who has the memory problem doesn't notice it, but family and friends do. People who have dementia may have increasing trouble with:  · Recalling recent events. They may forget appointments or lose objects. · Recognizing people and places. · Keeping up with conversations and activity.   · Finding their way around familiar places, or driving to and from places they know well. · Keeping up personal care such as grooming or bathing. · Planning and carrying out routine tasks. They may have trouble following a recipe or writing a letter or email. How is dementia treated? Medicines for dementia can slow it down for a while and make it easier to live with. Medicines can't cure it. But they may help improve mental function, mood, or behavior. If a stroke caused the dementia, doing things to reduce the chance of another stroke may help. They include eating healthy foods, being active, staying at a healthy weight, and not smoking. As dementia gets worse, a person may get depressed or angry and upset. An active social life, counseling, and sometimes medicine may help with changing emotions. The goals of ongoing treatment are to keep the person safely at home as long as possible and to provide support and guidance to the caregivers. The person will need routine follow-up visits. The doctor will monitor medicines and the person's level of functioning. Follow-up care is a key part of your treatment and safety. Be sure to make and go to all appointments, and call your doctor if you are having problems. It's also a good idea to know your test results and keep a list of the medicines you take. Where can you learn more? Go to http://www.gray.com/  Enter B576 in the search box to learn more about \"Learning About Dementia. \"  Current as of: June 16, 2021               Content Version: 13.0  © 8529-4373 [employer]. Care instructions adapted under license by [employer] (which disclaims liability or warranty for this information). If you have questions about a medical condition or this instruction, always ask your healthcare professional. [place] [address] any warranty or liability for your use of this information.

## 2022-01-13 ENCOUNTER — HOSPITAL ENCOUNTER (OUTPATIENT)
Dept: CT IMAGING | Age: 77
End: 2022-01-13
Attending: NURSE PRACTITIONER
Payer: MEDICARE

## 2022-01-13 ENCOUNTER — HOSPITAL ENCOUNTER (OUTPATIENT)
Dept: CT IMAGING | Age: 77
Discharge: HOME | End: 2022-01-13
Attending: NURSE PRACTITIONER
Payer: MEDICARE

## 2022-01-13 DIAGNOSIS — C50.919 INVASIVE LOBULAR CARCINOMA OF BREAST IN FEMALE (HCC): ICD-10-CM

## 2022-01-13 DIAGNOSIS — R45.86 MOOD SWINGS: ICD-10-CM

## 2022-01-13 DIAGNOSIS — R41.3 MEMORY CHANGE: ICD-10-CM

## 2022-01-13 DIAGNOSIS — R40.4 EPISODES OF STARING: ICD-10-CM

## 2022-01-13 LAB — CREAT BLD-MCNC: 0.8 MG/DL (ref 0.6–1.3)

## 2022-01-13 PROCEDURE — 74011000636 HC RX REV CODE- 636: Performed by: NURSE PRACTITIONER

## 2022-01-13 PROCEDURE — 82565 ASSAY OF CREATININE: CPT

## 2022-01-13 PROCEDURE — 70496 CT ANGIOGRAPHY HEAD: CPT

## 2022-01-13 PROCEDURE — 70450 CT HEAD/BRAIN W/O DYE: CPT

## 2022-01-13 RX ADMIN — IOPAMIDOL 100 ML: 755 INJECTION, SOLUTION INTRAVENOUS at 13:38

## 2022-01-25 RX ORDER — PANTOPRAZOLE SODIUM 40 MG/1
TABLET, DELAYED RELEASE ORAL
Qty: 90 TABLET | Refills: 3 | Status: SHIPPED | OUTPATIENT
Start: 2022-01-25

## 2022-02-08 ENCOUNTER — OFFICE VISIT (OUTPATIENT)
Dept: FAMILY MEDICINE CLINIC | Age: 77
End: 2022-02-08
Payer: MEDICARE

## 2022-02-08 VITALS
HEIGHT: 61 IN | BODY MASS INDEX: 23.86 KG/M2 | WEIGHT: 126.4 LBS | RESPIRATION RATE: 16 BRPM | OXYGEN SATURATION: 97 % | TEMPERATURE: 98.1 F | DIASTOLIC BLOOD PRESSURE: 76 MMHG | HEART RATE: 58 BPM | SYSTOLIC BLOOD PRESSURE: 160 MMHG

## 2022-02-08 DIAGNOSIS — R41.3 MEMORY CHANGE: ICD-10-CM

## 2022-02-08 DIAGNOSIS — R45.86 MOOD SWINGS: ICD-10-CM

## 2022-02-08 DIAGNOSIS — F41.9 ANXIETY: Primary | ICD-10-CM

## 2022-02-08 DIAGNOSIS — I10 PRIMARY HYPERTENSION: ICD-10-CM

## 2022-02-08 DIAGNOSIS — R40.4 EPISODES OF STARING: ICD-10-CM

## 2022-02-08 DIAGNOSIS — Z86.59 HISTORY OF OCD (OBSESSIVE COMPULSIVE DISORDER): ICD-10-CM

## 2022-02-08 DIAGNOSIS — C50.919 INVASIVE LOBULAR CARCINOMA OF BREAST IN FEMALE (HCC): ICD-10-CM

## 2022-02-08 PROCEDURE — G0463 HOSPITAL OUTPT CLINIC VISIT: HCPCS | Performed by: NURSE PRACTITIONER

## 2022-02-08 PROCEDURE — G8536 NO DOC ELDER MAL SCRN: HCPCS | Performed by: NURSE PRACTITIONER

## 2022-02-08 PROCEDURE — G9899 SCRN MAM PERF RSLTS DOC: HCPCS | Performed by: NURSE PRACTITIONER

## 2022-02-08 PROCEDURE — G8420 CALC BMI NORM PARAMETERS: HCPCS | Performed by: NURSE PRACTITIONER

## 2022-02-08 PROCEDURE — 1090F PRES/ABSN URINE INCON ASSESS: CPT | Performed by: NURSE PRACTITIONER

## 2022-02-08 PROCEDURE — G8427 DOCREV CUR MEDS BY ELIG CLIN: HCPCS | Performed by: NURSE PRACTITIONER

## 2022-02-08 PROCEDURE — G8754 DIAS BP LESS 90: HCPCS | Performed by: NURSE PRACTITIONER

## 2022-02-08 PROCEDURE — G8753 SYS BP > OR = 140: HCPCS | Performed by: NURSE PRACTITIONER

## 2022-02-08 PROCEDURE — G8432 DEP SCR NOT DOC, RNG: HCPCS | Performed by: NURSE PRACTITIONER

## 2022-02-08 PROCEDURE — 1101F PT FALLS ASSESS-DOCD LE1/YR: CPT | Performed by: NURSE PRACTITIONER

## 2022-02-08 PROCEDURE — 3017F COLORECTAL CA SCREEN DOC REV: CPT | Performed by: NURSE PRACTITIONER

## 2022-02-08 PROCEDURE — 99213 OFFICE O/P EST LOW 20 MIN: CPT | Performed by: NURSE PRACTITIONER

## 2022-02-08 RX ORDER — ASCORBIC ACID 500 MG
TABLET ORAL
COMMUNITY

## 2022-02-08 RX ORDER — GUAIFENESIN 100 MG/5ML
81 LIQUID (ML) ORAL DAILY
COMMUNITY

## 2022-02-08 RX ORDER — AA/PROT/LYSINE/METHIO/VIT C/B6 50-12.5 MG
TABLET ORAL
COMMUNITY

## 2022-02-08 RX ORDER — ESCITALOPRAM OXALATE 5 MG/1
2.5 TABLET ORAL DAILY
Qty: 45 TABLET | Refills: 1 | Status: SHIPPED | OUTPATIENT
Start: 2022-02-08 | End: 2022-04-07 | Stop reason: SDUPTHER

## 2022-02-08 NOTE — PATIENT INSTRUCTIONS
DASH Diet: Care Instructions  Your Care Instructions     The DASH diet is an eating plan that can help lower your blood pressure. DASH stands for Dietary Approaches to Stop Hypertension. Hypertension is high blood pressure. The DASH diet focuses on eating foods that are high in calcium, potassium, and magnesium. These nutrients can lower blood pressure. The foods that are highest in these nutrients are fruits, vegetables, low-fat dairy products, nuts, seeds, and legumes. But taking calcium, potassium, and magnesium supplements instead of eating foods that are high in those nutrients does not have the same effect. The DASH diet also includes whole grains, fish, and poultry. The DASH diet is one of several lifestyle changes your doctor may recommend to lower your high blood pressure. Your doctor may also want you to decrease the amount of sodium in your diet. Lowering sodium while following the DASH diet can lower blood pressure even further than just the DASH diet alone. Follow-up care is a key part of your treatment and safety. Be sure to make and go to all appointments, and call your doctor if you are having problems. It's also a good idea to know your test results and keep a list of the medicines you take. How can you care for yourself at home? Following the DASH diet  · Eat 4 to 5 servings of fruit each day. A serving is 1 medium-sized piece of fruit, ½ cup chopped or canned fruit, 1/4 cup dried fruit, or 4 ounces (½ cup) of fruit juice. Choose fruit more often than fruit juice. · Eat 4 to 5 servings of vegetables each day. A serving is 1 cup of lettuce or raw leafy vegetables, ½ cup of chopped or cooked vegetables, or 4 ounces (½ cup) of vegetable juice. Choose vegetables more often than vegetable juice. · Get 2 to 3 servings of low-fat and fat-free dairy each day. A serving is 8 ounces of milk, 1 cup of yogurt, or 1 ½ ounces of cheese. · Eat 6 to 8 servings of grains each day.  A serving is 1 slice of bread, 1 ounce of dry cereal, or ½ cup of cooked rice, pasta, or cooked cereal. Try to choose whole-grain products as much as possible. · Limit lean meat, poultry, and fish to 2 servings each day. A serving is 3 ounces, about the size of a deck of cards. · Eat 4 to 5 servings of nuts, seeds, and legumes (cooked dried beans, lentils, and split peas) each week. A serving is 1/3 cup of nuts, 2 tablespoons of seeds, or ½ cup of cooked beans or peas. · Limit fats and oils to 2 to 3 servings each day. A serving is 1 teaspoon of vegetable oil or 2 tablespoons of salad dressing. · Limit sweets and added sugars to 5 servings or less a week. A serving is 1 tablespoon jelly or jam, ½ cup sorbet, or 1 cup of lemonade. · Eat less than 2,300 milligrams (mg) of sodium a day. If you limit your sodium to 1,500 mg a day, you can lower your blood pressure even more. · Be aware that all of these are the suggested number of servings for people who eat 1,800 to 2,000 calories a day. Your recommended number of servings may be different if you need more or fewer calories. Tips for success  · Start small. Do not try to make dramatic changes to your diet all at once. You might feel that you are missing out on your favorite foods and then be more likely to not follow the plan. Make small changes, and stick with them. Once those changes become habit, add a few more changes. · Try some of the following:  ? Make it a goal to eat a fruit or vegetable at every meal and at snacks. This will make it easy to get the recommended amount of fruits and vegetables each day. ? Try yogurt topped with fruit and nuts for a snack or healthy dessert. ? Add lettuce, tomato, cucumber, and onion to sandwiches. ? Combine a ready-made pizza crust with low-fat mozzarella cheese and lots of vegetable toppings. Try using tomatoes, squash, spinach, broccoli, carrots, cauliflower, and onions. ?  Have a variety of cut-up vegetables with a low-fat dip as an appetizer instead of chips and dip. ? Sprinkle sunflower seeds or chopped almonds over salads. Or try adding chopped walnuts or almonds to cooked vegetables. ? Try some vegetarian meals using beans and peas. Add garbanzo or kidney beans to salads. Make burritos and tacos with mashed mendiola beans or black beans. Where can you learn more? Go to http://www.erazo.com/  Enter H967 in the search box to learn more about \"DASH Diet: Care Instructions. \"  Current as of: April 29, 2021               Content Version: 13.0  © 6715-4357 [employer]. Care instructions adapted under license by [facility] (which disclaims liability or warranty for this information). If you have questions about a medical condition or this instruction, always ask your healthcare professional. [address] any warranty or liability for your use of this information.

## 2022-02-08 NOTE — PROGRESS NOTES
Subjective:     Chief Complaint   Patient presents with    Anxiety     1 month f/u        HPI:  78 y.o.  presents for follow up appointment. I started her on lexapro 2.5 mg last month for reactive anxiety and mood swings. Feels moods are better, \"a change for the good\"   Denies depression, SI/HI  Sleeping well. Not feeling overwhelmed. Head CT normal     Just started radiation a week ago. BP elevated today. Not checking BP at home. Has been normal.  Taking medications as directed. No CP, HA, SOB, palpitations or swelling         No hospital, ER or specialist visits since last primary care visit except as noted above. Past Medical History:   Diagnosis Date    Anxiety     Arrhythmia     Arthritis     Breast cancer ([facility] [address]     left side     GERD (gastroesophageal reflux disease)     High cholesterol     HTN (hypertension)     Hypothyroid     Memory deficit     Shingles outbreak        Social History     Tobacco Use    Smoking status: Never Smoker    Smokeless tobacco: Never Used   Vaping Use    Vaping Use: Never used   Substance Use Topics    Alcohol use: No    Drug use: Never       Outpatient Medications Marked as Taking for the 2/8/22 encounter (Office Visit) with rBittanie Floyd NP   Medication Sig Dispense Refill    coenzyme q10 (Co Q-10) 10 mg cap Take  by mouth.  ascorbic acid, vitamin C, (Vitamin C) 500 mg tablet Take  by mouth.  b complex-vitamin c-folic acid (NEPHROCAPS) 1 mg capsule Take 1 Capsule by mouth daily.  aspirin 81 mg chewable tablet Take 81 mg by mouth daily.  pantoprazole (PROTONIX) 40 mg tablet TAKE 1 TABLET DAILY 90 Tablet 3    escitalopram oxalate (LEXAPRO) 5 mg tablet Take 0.5 Tablets by mouth daily. 30 Tablet 1    fexofenadine HCl (ALLEGRA PO) Take  by mouth. BID      letrozole (FEMARA) 2.5 mg tablet Take 1 Tablet by mouth daily.  90 Tablet 2    levothyroxine (SYNTHROID) 137 mcg tablet TAKE 1 TABLET DAILY BEFORE BREAKFAST 90 Tablet 3  atorvastatin (LIPITOR) 40 mg tablet TAKE 1 TABLET NIGHTLY FOR CHOLESTEROL 90 Tablet 3    atenoloL (TENORMIN) 25 mg tablet TAKE 2 TABLETS DAILY 180 Tablet 3    hydroCHLOROthiazide (HYDRODIURIL) 12.5 mg tablet TAKE 1 TABLET DAILY 90 Tablet 3    alendronate (FOSAMAX) 70 mg tablet TAKE 1 TABLET EVERY 7 DAYS 12 Tab 3    lutein 20 mg tab Take 20 mg by mouth daily.  cholecalciferol, vitamin d3, 400 unit cap Take  by mouth. Allergies   Allergen Reactions    Pneumococcal 23-Roxanne Ps Vaccine Swelling       Health Maintenance reviewed -     ROS:  Gen: no fatigue, no fever, no chills, no unexplained weight loss or weight gain  Eyes: no excessive tearing, itching, or discharge  Nose: no rhinorrhea, no sinus pain  Mouth: no oral lesions, no sore throat, no difficulty swallowing  Resp: no shortness of breath, no wheezing, no cough  CV: no chest pain, no orthopnea, no paroxysmal nocturnal dyspnea, no lower extremity edema, no palpitations  Abd: no nausea, no heartburn, no diarrhea, no constipation, no abdominal pain  Neuro: no headaches, no syncope or presyncopal episodes  Endo: no polyuria, no polydipsia. : no hematuria, no dysuria, no frequency, no incontinence  Heme: no lymphadenopathy, no easy bruising or bleeding, no night sweats  MSK: no joint pain or swelling    PE:  Visit Vitals  BP (!) 160/76   Pulse (!) 58   Temp 98.1 °F (36.7 °C)   Resp 16   Ht 5' 1\" (1.549 m)   Wt 126 lb 6.4 oz (57.3 kg)   LMP 01/06/1996 (Exact Date)   SpO2 97%   BMI 23.88 kg/m²     Gen: alert, oriented, no acute distress  Head: normocephalic, atraumatic    Neck: symmetric normal sized thyroid, no carotid bruits, no jugular vein distention  Resp: no increase work of breathing, lungs clear to ausculation bilaterally, no wheezing, rales or rhonchi  CV: S1, S2 normal.  No murmurs, rubs, or gallops. Abd: soft, not tender, not distended. No hepatosplenomegaly. Normal bowel sounds. No hernias.  No abdominal or renal bruits. Neuro: cranial nerves intact, normal strength and movement in all extremities, and sensation intact and symmetric. Skin: no lesion or rash  Extremities: no cyanosis or edema    No results found for this visit on 02/08/22. Assessment/Plan:  Differential diagnosis and treatment options reviewed with patient who is in agreement with treatment plan as outlined below. ICD-10-CM ICD-9-CM    1. Anxiety  F41.9 300.00 escitalopram oxalate (LEXAPRO) 5 mg tablet   2. Invasive lobular carcinoma of breast in female ([facility] [address]  C50.919 174.9    3. Mood swings  R45.86 799.24 escitalopram oxalate (LEXAPRO) 5 mg tablet   4. Memory change  R41.3 780.93 escitalopram oxalate (LEXAPRO) 5 mg tablet   5. Episodes of staring  R40.4 780.02 escitalopram oxalate (LEXAPRO) 5 mg tablet   6. History of OCD (obsessive compulsive disorder)  Z86.59 V11.2 escitalopram oxalate (LEXAPRO) 5 mg tablet   7. Primary hypertension  I10 401.9      Anxiety, moods, OCD symptoms are improving. Continue same dosing on lexapro. Follow up in three months or sooner if needed. BP elevated but had been pretty good. DASH diet encouraged. Will monitor at home  Nurse visit in the next week for recheck, if still elevated may need to add medication to treat. Discussed BMI and healthy weight. Encouraged patient to work to implement changes including diet high in raw fruits and vegetables, lean protein and good fats. Limit refined, processed carbohydrates and sugar. Encouraged regular exercise. Recommended regular cardiovascular exercise 3-6 times per week for 30-60 minutes daily. I have discussed the diagnosis with the patient and the intended plan as seen in the above orders. The patient has received an after-visit summary and questions were answered concerning future plans. I have discussed medication side effects and warnings with the patient as well. The patient verbalizes understanding and agreement with the plan.

## 2022-02-08 NOTE — PROGRESS NOTES
Chief Complaint   Patient presents with    Anxiety     1 month f/u     Pt states she is feeling fine. Pt has started radiation at [facility] on 1/31/2022 Dr. Ayesha Mata. 1. Have you been to the ER, urgent care clinic since your last visit? Hospitalized since your last visit? No    2. Have you seen or consulted any other health care providers outside of the [address] [place] [address] since your last visit? Include any pap smears or colon screening.  No    Visit Vitals  BP (!) 172/89 (BP 1 Location: Left arm, BP Patient Position: Sitting)   Pulse (!) 58   Temp 98.1 °F (36.7 °C)   Resp 16   Ht 5' 1\" (1.549 m)   Wt 126 lb 6.4 oz (57.3 kg)   LMP 01/06/1996 (Exact Date)   SpO2 97%   BMI 23.88 kg/m²

## 2022-02-11 RX ORDER — ALENDRONATE SODIUM 70 MG/1
TABLET ORAL
Qty: 12 TABLET | Refills: 3 | Status: SHIPPED | OUTPATIENT
Start: 2022-02-11

## 2022-02-16 ENCOUNTER — TELEPHONE (OUTPATIENT)
Dept: FAMILY MEDICINE CLINIC | Age: 77
End: 2022-02-16

## 2022-02-16 NOTE — TELEPHONE ENCOUNTER
MrBailey Belgica Cain called on his wife's behalf, Ms. Kendra Cano. Patient was instructed to talk one tablet instead of a half tablet. Patient now needs to refill the prescription but it needs to be changed to 1 tablet daily and he has also [facility] day supply.

## 2022-02-16 NOTE — TELEPHONE ENCOUNTER
verified by Donna Grove. Pt was unsure if she is to take 0.5 or 1 Lexapro. Pt needed clarification.

## 2022-02-16 NOTE — TELEPHONE ENCOUNTER
She told me that she was doing well on the half tablet. If she felt that she needed the whole tablet they were to call and let me know. Did she start taking a whole tablet?

## 2022-03-04 ENCOUNTER — TELEPHONE (OUTPATIENT)
Dept: SURGERY | Age: 77
End: 2022-03-04

## 2022-03-04 NOTE — TELEPHONE ENCOUNTER
Patient's  called and left a message with the hello service wondering if it is ok for his wife to go to [employer] and to a birthday party. She just completed XRT. I called him back and let him know that I thought it is fine for her to go to [facility] or to a birthday party, but I advised for her to wear a mask. He was so appreciative of all the people and care we give [facility]

## 2022-03-04 NOTE — PROGRESS NOTES
Donnie Zapata is a 78 y.o. female here for 3 month follow up for right breast cancer. Pt completed radiation on Feb 21st.  She has her Letrozole with her. Has not started. She is doing well. 1. Have you been to the ER, urgent care clinic since your last visit? Hospitalized since your last visit? no    2. Have you seen or consulted any other health care providers outside of the [address] [place] [address] since your last visit? Include any pap smears or colon screening.  radiation

## 2022-03-07 ENCOUNTER — OFFICE VISIT (OUTPATIENT)
Dept: ONCOLOGY | Age: 77
End: 2022-03-07
Payer: MEDICARE

## 2022-03-07 VITALS
OXYGEN SATURATION: 96 % | SYSTOLIC BLOOD PRESSURE: 136 MMHG | BODY MASS INDEX: 23.6 KG/M2 | HEIGHT: 61 IN | HEART RATE: 62 BPM | WEIGHT: 125 LBS | TEMPERATURE: 98 F | DIASTOLIC BLOOD PRESSURE: 67 MMHG

## 2022-03-07 DIAGNOSIS — C50.411 MALIGNANT NEOPLASM OF UPPER-OUTER QUADRANT OF RIGHT BREAST IN FEMALE, ESTROGEN RECEPTOR POSITIVE (HCC): Primary | ICD-10-CM

## 2022-03-07 DIAGNOSIS — M81.0 AGE-RELATED OSTEOPOROSIS WITHOUT CURRENT PATHOLOGICAL FRACTURE: ICD-10-CM

## 2022-03-07 DIAGNOSIS — Z17.0 MALIGNANT NEOPLASM OF UPPER-OUTER QUADRANT OF RIGHT BREAST IN FEMALE, ESTROGEN RECEPTOR POSITIVE (HCC): Primary | ICD-10-CM

## 2022-03-07 PROCEDURE — G8432 DEP SCR NOT DOC, RNG: HCPCS | Performed by: INTERNAL MEDICINE

## 2022-03-07 PROCEDURE — G0463 HOSPITAL OUTPT CLINIC VISIT: HCPCS | Performed by: INTERNAL MEDICINE

## 2022-03-07 PROCEDURE — G8754 DIAS BP LESS 90: HCPCS | Performed by: INTERNAL MEDICINE

## 2022-03-07 PROCEDURE — 99214 OFFICE O/P EST MOD 30 MIN: CPT | Performed by: INTERNAL MEDICINE

## 2022-03-07 PROCEDURE — G8752 SYS BP LESS 140: HCPCS | Performed by: INTERNAL MEDICINE

## 2022-03-07 PROCEDURE — G8427 DOCREV CUR MEDS BY ELIG CLIN: HCPCS | Performed by: INTERNAL MEDICINE

## 2022-03-07 PROCEDURE — G8420 CALC BMI NORM PARAMETERS: HCPCS | Performed by: INTERNAL MEDICINE

## 2022-03-07 PROCEDURE — 3017F COLORECTAL CA SCREEN DOC REV: CPT | Performed by: INTERNAL MEDICINE

## 2022-03-07 PROCEDURE — G8536 NO DOC ELDER MAL SCRN: HCPCS | Performed by: INTERNAL MEDICINE

## 2022-03-07 PROCEDURE — 1090F PRES/ABSN URINE INCON ASSESS: CPT | Performed by: INTERNAL MEDICINE

## 2022-03-07 PROCEDURE — G9899 SCRN MAM PERF RSLTS DOC: HCPCS | Performed by: INTERNAL MEDICINE

## 2022-03-07 PROCEDURE — 1101F PT FALLS ASSESS-DOCD LE1/YR: CPT | Performed by: INTERNAL MEDICINE

## 2022-03-07 NOTE — PROGRESS NOTES
[address] [facility] [address], [address] [facility], [address] [place] [address], [address] [place] [address]  [phone]      Oncology Note        Patient: Belkis Pelaez MRN: [id number]  SSN: [id number]    YOB: 1945  Age: 78 y.o. Sex: female        Diagnosis:     1. Right breast carcinoma:  T1c N0 (Stage I) invasive lobular carcinoma, Tumor size 15 mm, LN -ve, grade 1, ER 99%, ME 0%, Her 2 2+, FISH -ve. DCIS present. Treatment:     1. S/P right breast lumpectomy 12/15/2021  2. Completed adjuvant radiation 02/21/2022  3. Adjuvant Letrozole, start 03/07/2022    Subjective:      Belkis Pelaez is a 78 y.o. female who I am seeing for a new diagnosis of right sided invasive lobular carcinoma. She underwent a routine screening mammogram. An abnormal mammogram led to a biopsy which revealed invasive lobular carcinoma. She then underwent a right breast lumpectomy 12/16/2021. She has completed adjuvant radiation in Feb 2022. She is doing well.         Review of Systems:    Constitutional: negative  Eyes: negative  Ears, Nose, Mouth, Throat, and Face: negative  Respiratory: negative  Cardiovascular: negative  Gastrointestinal: negative  Genitourinary:negative  Integument/Breast: negative  Hematologic/Lymphatic: negative  Musculoskeletal:negative  Neurological: negative        Past Medical History:   Diagnosis Date    Anxiety     Arrhythmia     Arthritis     Breast cancer ([address]     left side     GERD (gastroesophageal reflux disease)     High cholesterol     HTN (hypertension)     Hypothyroid     Memory deficit     Shingles outbreak      Past Surgical History:   Procedure Laterality Date    COLONOSCOPY N/A 6/7/2019    COLONOSCOPY performed by Joann Long MD at [facility] ENDOSCOPY    HX BREAST LUMPECTOMY Right 12/16/2021    RIGHT BREAST LUMPECTOMY WITH ULTRASOUND AND RIGHT BREAST SENTINEL NODE BIOPSY performed by William Lynne MD at [facility] AMBULATORY OR  HX GYN      hysterectomy    HX GYN      vaginal birth x 1      Family History   Problem Relation Age of Onset    Hypertension Sister     Stroke Mother     Cancer Father      Social History     Tobacco Use    Smoking status: Never Smoker    Smokeless tobacco: Never Used   Substance Use Topics    Alcohol use: No      Prior to Admission medications    Medication Sig Start Date End Date Taking? Authorizing Provider   alendronate (FOSAMAX) 70 mg tablet TAKE 1 TABLET EVERY 7 DAYS 2/11/22  Yes Terry Herring MD   coenzyme q10 (Co Q-10) 10 mg cap Take  by mouth. Yes Provider, Historical   ascorbic acid, vitamin C, (Vitamin C) 500 mg tablet Take  by mouth. Yes Provider, Historical   b complex-vitamin c-folic acid (NEPHROCAPS) 1 mg capsule Take 1 Capsule by mouth daily. Yes Provider, Historical   aspirin 81 mg chewable tablet Take 81 mg by mouth daily. Yes Provider, Historical   escitalopram oxalate (LEXAPRO) 5 mg tablet Take 0.5 Tablets by mouth daily. 2/8/22  Yes Brittanie Floyd NP   pantoprazole (PROTONIX) 40 mg tablet TAKE 1 TABLET DAILY 1/25/22  Yes Terry Herring MD   fexofenadine HCl (ALLEGRA PO) Take  by mouth. BID   Yes Provider, Historical   letrozole (FEMARA) 2.5 mg tablet Take 1 Tablet by mouth daily. 1/5/22  Yes Judi BERUMEN NP   levothyroxine (SYNTHROID) 137 mcg tablet TAKE 1 TABLET DAILY BEFORE BREAKFAST 12/30/21  Yes Terry Herring MD   atorvastatin (LIPITOR) 40 mg tablet TAKE 1 TABLET NIGHTLY FOR CHOLESTEROL 10/27/21  Yes Terry Herring MD   atenoloL (TENORMIN) 25 mg tablet TAKE 2 TABLETS DAILY 10/15/21  Yes Terry Herring MD   hydroCHLOROthiazide (HYDRODIURIL) 12.5 mg tablet TAKE 1 TABLET DAILY 8/9/21  Yes Stephani Ann MD   lutein 20 mg tab Take 20 mg by mouth daily. Yes Provider, Historical   cholecalciferol, vitamin d3, 400 unit cap Take  by mouth.    Yes Provider, Historical              Allergies   Allergen Reactions    Pneumococcal 23-Roxanne Ps Vaccine Swelling Objective:     Vitals:    03/07/22 1258   BP: 136/67   Pulse: 62   Temp: 98 °F (36.7 °C)   TempSrc: Temporal   SpO2: 96%   Weight: 125 lb (56.7 kg)   Height: 5' 1\" (1.549 m)            Physical Exam:    GENERAL: alert, cooperative, no distress, appears stated age  EYE: conjunctivae/corneas clear. PERRL, EOM's intact  LYMPHATIC: Cervical, supraclavicular, and axillary nodes normal.   THROAT & NECK: normal and no erythema or exudates noted. LUNG: clear to auscultation bilaterally  HEART: regular rate and rhythm, S1, S2 normal, no murmur, click, rub or gallop  ABDOMEN: soft, non-tender. Bowel sounds normal. No masses,  no organomegaly  EXTREMITIES:  extremities normal, atraumatic, no cyanosis or edema  SKIN: Normal.  NEUROLOGIC: AOx3. Gait normal. Reflexes and motor strength normal and symmetric. Cranial nerves 2-12 and sensation grossly intact. Physical exam and ROS has been modified from a prior visit to make it relevant and current        Assessment:     1. Right breast carcinoma:  T1c N0 (Stage I) invasive lobular carcinoma, Tumor size 15 mm, LN -ve, grade 1, ER 99%, NV 0%, Her 2 2+, FISH -ve. DCIS present. ECOG PS 0  Intent of Treatment - curative  Prognosis - excellent    S/P right breast lumpectomy 12/15/2021  Adjuvant radiation to the right breast on 03/21/2022  Start Letrozole. She understands the side effects of the medicine and is willing to take it. 2. Osteoporosis    On Fosamax. Plan:       1. Start Letrozole. 3. Return in 3 months      Signed By: Luis Puentes MD     March 7, 2022           CC. Cathi Holm MD  CC.  Pastor Jose MD

## 2022-03-07 NOTE — LETTER
3/7/2022    Patient: Samuel Strickland   YOB: 1945   Date of Visit: 3/7/2022     Bandar Mireles MD  [address]  [address] [facility]    Dear Bandar Mireles MD,      Thank you for referring Ms. Remonia Sicard to [address] [place] [address] for evaluation. My notes for this consultation are attached. If you have questions, please do not hesitate to call me. I look forward to following your patient along with you.       Sincerely,    Shayna Castellanos MD

## 2022-03-20 PROBLEM — M81.0 AGE-RELATED OSTEOPOROSIS WITHOUT CURRENT PATHOLOGICAL FRACTURE: Status: ACTIVE | Noted: 2017-12-06

## 2022-04-07 DIAGNOSIS — Z86.59 HISTORY OF OCD (OBSESSIVE COMPULSIVE DISORDER): ICD-10-CM

## 2022-04-07 DIAGNOSIS — R41.3 MEMORY CHANGE: ICD-10-CM

## 2022-04-07 DIAGNOSIS — R45.86 MOOD SWINGS: ICD-10-CM

## 2022-04-07 DIAGNOSIS — F41.9 ANXIETY: ICD-10-CM

## 2022-04-07 DIAGNOSIS — R40.4 EPISODES OF STARING: ICD-10-CM

## 2022-04-07 RX ORDER — ESCITALOPRAM OXALATE 5 MG/1
2.5 TABLET ORAL DAILY
Qty: 45 TABLET | Refills: 1 | Status: SHIPPED | OUTPATIENT
Start: 2022-04-07 | End: 2022-05-09 | Stop reason: SDUPTHER

## 2022-04-07 NOTE — TELEPHONE ENCOUNTER
Pt is requesting a refill on  Requested Prescriptions     Pending Prescriptions Disp Refills    escitalopram oxalate (LEXAPRO) 5 mg tablet 45 Tablet 1     Sig: Take 0.5 Tablets by mouth daily.        Pt also states that you had increased the dosage to 1 tablet once a day    Pt confirmed the pharmacy as [facility]     Pt will run out by Tuesday 04/12

## 2022-05-09 ENCOUNTER — OFFICE VISIT (OUTPATIENT)
Dept: FAMILY MEDICINE CLINIC | Age: 77
End: 2022-05-09
Payer: MEDICARE

## 2022-05-09 VITALS
RESPIRATION RATE: 18 BRPM | SYSTOLIC BLOOD PRESSURE: 156 MMHG | WEIGHT: 122.8 LBS | OXYGEN SATURATION: 98 % | TEMPERATURE: 98.8 F | HEART RATE: 63 BPM | HEIGHT: 61 IN | BODY MASS INDEX: 23.18 KG/M2 | DIASTOLIC BLOOD PRESSURE: 82 MMHG

## 2022-05-09 DIAGNOSIS — F41.9 ANXIETY: ICD-10-CM

## 2022-05-09 DIAGNOSIS — Z13.220 SCREENING, LIPID: ICD-10-CM

## 2022-05-09 DIAGNOSIS — I10 PRIMARY HYPERTENSION: Primary | ICD-10-CM

## 2022-05-09 DIAGNOSIS — M20.002 FINGER DEFORMITY, LEFT: ICD-10-CM

## 2022-05-09 DIAGNOSIS — R41.3 MEMORY CHANGE: ICD-10-CM

## 2022-05-09 DIAGNOSIS — R45.86 MOOD SWINGS: ICD-10-CM

## 2022-05-09 DIAGNOSIS — E03.9 ACQUIRED HYPOTHYROIDISM: ICD-10-CM

## 2022-05-09 DIAGNOSIS — R40.4 EPISODES OF STARING: ICD-10-CM

## 2022-05-09 DIAGNOSIS — E55.9 VITAMIN D DEFICIENCY: ICD-10-CM

## 2022-05-09 DIAGNOSIS — Z86.59 HISTORY OF OCD (OBSESSIVE COMPULSIVE DISORDER): ICD-10-CM

## 2022-05-09 LAB
25(OH)D3 SERPL-MCNC: 41 NG/ML (ref 30–100)
ALBUMIN SERPL-MCNC: 3.9 G/DL (ref 3.5–5)
ALBUMIN/GLOB SERPL: 1.3 {RATIO} (ref 1.1–2.2)
ALP SERPL-CCNC: 60 U/L (ref 45–117)
ALT SERPL-CCNC: 20 U/L (ref 12–78)
ANION GAP SERPL CALC-SCNC: 4 MMOL/L (ref 5–15)
AST SERPL-CCNC: 22 U/L (ref 15–37)
BILIRUB SERPL-MCNC: 0.8 MG/DL (ref 0.2–1)
BUN SERPL-MCNC: 14 MG/DL (ref 6–20)
BUN/CREAT SERPL: 18 (ref 12–20)
CALCIUM SERPL-MCNC: 9.7 MG/DL (ref 8.5–10.1)
CHLORIDE SERPL-SCNC: 100 MMOL/L (ref 97–108)
CHOLEST SERPL-MCNC: 148 MG/DL
CO2 SERPL-SCNC: 32 MMOL/L (ref 21–32)
CREAT SERPL-MCNC: 0.8 MG/DL (ref 0.55–1.02)
ERYTHROCYTE [DISTWIDTH] IN BLOOD BY AUTOMATED COUNT: 12.3 % (ref 11.5–14.5)
GLOBULIN SER CALC-MCNC: 3 G/DL (ref 2–4)
GLUCOSE SERPL-MCNC: 92 MG/DL (ref 65–100)
HCT VFR BLD AUTO: 42.6 % (ref 35–47)
HDLC SERPL-MCNC: 59 MG/DL
HDLC SERPL: 2.5 {RATIO} (ref 0–5)
HGB BLD-MCNC: 14 G/DL (ref 11.5–16)
LDLC SERPL CALC-MCNC: 65 MG/DL (ref 0–100)
MCH RBC QN AUTO: 30 PG (ref 26–34)
MCHC RBC AUTO-ENTMCNC: 32.9 G/DL (ref 30–36.5)
MCV RBC AUTO: 91.4 FL (ref 80–99)
NRBC # BLD: 0 K/UL (ref 0–0.01)
NRBC BLD-RTO: 0 PER 100 WBC
PLATELET # BLD AUTO: 245 K/UL (ref 150–400)
PMV BLD AUTO: 10.8 FL (ref 8.9–12.9)
POTASSIUM SERPL-SCNC: 4.2 MMOL/L (ref 3.5–5.1)
PROT SERPL-MCNC: 6.9 G/DL (ref 6.4–8.2)
RBC # BLD AUTO: 4.66 M/UL (ref 3.8–5.2)
SODIUM SERPL-SCNC: 136 MMOL/L (ref 136–145)
T4 FREE SERPL-MCNC: 1.6 NG/DL (ref 0.8–1.5)
TRIGL SERPL-MCNC: 120 MG/DL (ref ?–150)
TSH SERPL DL<=0.05 MIU/L-ACNC: 0.31 UIU/ML (ref 0.36–3.74)
VLDLC SERPL CALC-MCNC: 24 MG/DL
WBC # BLD AUTO: 4.7 K/UL (ref 3.6–11)

## 2022-05-09 PROCEDURE — 1101F PT FALLS ASSESS-DOCD LE1/YR: CPT | Performed by: NURSE PRACTITIONER

## 2022-05-09 PROCEDURE — G8432 DEP SCR NOT DOC, RNG: HCPCS | Performed by: NURSE PRACTITIONER

## 2022-05-09 PROCEDURE — G0463 HOSPITAL OUTPT CLINIC VISIT: HCPCS | Performed by: NURSE PRACTITIONER

## 2022-05-09 PROCEDURE — G8420 CALC BMI NORM PARAMETERS: HCPCS | Performed by: NURSE PRACTITIONER

## 2022-05-09 PROCEDURE — G8753 SYS BP > OR = 140: HCPCS | Performed by: NURSE PRACTITIONER

## 2022-05-09 PROCEDURE — 99214 OFFICE O/P EST MOD 30 MIN: CPT | Performed by: NURSE PRACTITIONER

## 2022-05-09 PROCEDURE — 3017F COLORECTAL CA SCREEN DOC REV: CPT | Performed by: NURSE PRACTITIONER

## 2022-05-09 PROCEDURE — G8536 NO DOC ELDER MAL SCRN: HCPCS | Performed by: NURSE PRACTITIONER

## 2022-05-09 PROCEDURE — G8754 DIAS BP LESS 90: HCPCS | Performed by: NURSE PRACTITIONER

## 2022-05-09 PROCEDURE — G9899 SCRN MAM PERF RSLTS DOC: HCPCS | Performed by: NURSE PRACTITIONER

## 2022-05-09 PROCEDURE — G8427 DOCREV CUR MEDS BY ELIG CLIN: HCPCS | Performed by: NURSE PRACTITIONER

## 2022-05-09 PROCEDURE — 1090F PRES/ABSN URINE INCON ASSESS: CPT | Performed by: NURSE PRACTITIONER

## 2022-05-09 RX ORDER — AMLODIPINE BESYLATE 2.5 MG/1
2.5 TABLET ORAL DAILY
Qty: 90 TABLET | Refills: 1 | Status: SHIPPED | OUTPATIENT
Start: 2022-05-09 | End: 2022-09-16 | Stop reason: SDUPTHER

## 2022-05-09 RX ORDER — ESCITALOPRAM OXALATE 5 MG/1
5 TABLET ORAL DAILY
Qty: 90 TABLET | Refills: 1 | Status: SHIPPED | OUTPATIENT
Start: 2022-05-09 | End: 2022-09-16 | Stop reason: SDUPTHER

## 2022-05-09 NOTE — PROGRESS NOTES
Chief Complaint   Patient presents with    Follow-up     3 month med check     1. Have you been to the ER, urgent care clinic since your last visit? Hospitalized since your last visit? NO    2. Have you seen or consulted any other health care providers outside of the [address] [place] [address] since your last visit? Include any pap smears or colon screening.   No    Health Maintenance Due   Topic Date Due    DTaP/Tdap/Td series (1 - Tdap) Never done

## 2022-05-09 NOTE — PROGRESS NOTES
Subjective:     Chief Complaint   Patient presents with    Follow-up     3 month med check        HPI:  78 y.o.  presents for follow up appointment. BP little elevated today, she says that she always has a little elevated BP at doctors office. No new swelling in legs. Has had swelling in her legs for several years, \"been like that since I had my hysterectomy in 1996\", no pedal edema though. Feels that her moods and anxiety are stable on lexapro. Taking 5mg daily. Denies SI/HI       Left 2nd finger deformity at the DIP, noted about 2 months ago. No injury recalled. Says that the area of deformity hurts sometimes, \"feels sore and achy\". Never red and no real swelling. No hospital, ER or specialist visits since last primary care visit except as noted above. Past Medical History:   Diagnosis Date    Anxiety     Arrhythmia     Arthritis     Breast cancer ([facility] [address]     left side     GERD (gastroesophageal reflux disease)     High cholesterol     HTN (hypertension)     Hypothyroid     Memory deficit     Shingles outbreak        Social History     Tobacco Use    Smoking status: Never Smoker    Smokeless tobacco: Never Used   Vaping Use    Vaping Use: Never used   Substance Use Topics    Alcohol use: No    Drug use: Never       Outpatient Medications Marked as Taking for the 5/9/22 encounter (Office Visit) with Brittanie Floyd NP   Medication Sig Dispense Refill    escitalopram oxalate (LEXAPRO) 5 mg tablet Take 0.5 Tablets by mouth daily. 45 Tablet 1    alendronate (FOSAMAX) 70 mg tablet TAKE 1 TABLET EVERY 7 DAYS 12 Tablet 3    coenzyme q10 (Co Q-10) 10 mg cap Take  by mouth.  ascorbic acid, vitamin C, (Vitamin C) 500 mg tablet Take  by mouth.  b complex-vitamin c-folic acid (NEPHROCAPS) 1 mg capsule Take 1 Capsule by mouth daily.  aspirin 81 mg chewable tablet Take 81 mg by mouth daily.       pantoprazole (PROTONIX) 40 mg tablet TAKE 1 TABLET DAILY 90 Tablet 3    fexofenadine HCl (ALLEGRA PO) Take  by mouth. BID      letrozole (FEMARA) 2.5 mg tablet Take 1 Tablet by mouth daily. 90 Tablet 2    levothyroxine (SYNTHROID) 137 mcg tablet TAKE 1 TABLET DAILY BEFORE BREAKFAST 90 Tablet 3    atorvastatin (LIPITOR) 40 mg tablet TAKE 1 TABLET NIGHTLY FOR CHOLESTEROL 90 Tablet 3    atenoloL (TENORMIN) 25 mg tablet TAKE 2 TABLETS DAILY 180 Tablet 3    hydroCHLOROthiazide (HYDRODIURIL) 12.5 mg tablet TAKE 1 TABLET DAILY 90 Tablet 3    lutein 20 mg tab Take 20 mg by mouth daily.  cholecalciferol, vitamin d3, 400 unit cap Take  by mouth. Allergies   Allergen Reactions    Pneumococcal 23-Roxanne Ps Vaccine Swelling       Health Maintenance reviewed      ROS:  Gen: no fatigue, no fever, no chills, no unexplained weight loss or weight gain  Eyes: no excessive tearing, itching, or discharge  Nose: no rhinorrhea, no sinus pain  Mouth: no oral lesions, no sore throat, no difficulty swallowing  Resp: no shortness of breath, no wheezing, no cough  CV: no chest pain, no orthopnea, no paroxysmal nocturnal dyspnea, no lower extremity edema, no palpitations  Abd: no nausea, no heartburn, no diarrhea, no constipation, no abdominal pain  Neuro: no headaches, no syncope or presyncopal episodes  Endo: no polyuria, no polydipsia. : no hematuria, no dysuria, no frequency, no incontinence  Heme: no lymphadenopathy, no easy bruising or bleeding, no night sweats.   +lymphedema lower extremites      PE:  Visit Vitals  BP (!) 156/82   Pulse 63   Temp 98.8 °F (37.1 °C) (Temporal)   Resp 18   Ht 5' 1\" (1.549 m)   Wt 122 lb 12.8 oz (55.7 kg)   LMP 01/06/1996 (Exact Date)   SpO2 98%   BMI 23.20 kg/m²     Gen: alert, oriented, no acute distress  Head: normocephalic, atraumatic  Eyes: pupils equal round reactive to light, sclera clear, conjunctiva clear  Neck: symmetric normal sized thyroid, no carotid bruits, no jugular vein distention  Resp: no increase work of breathing, lungs clear to ausculation bilaterally, no wheezing, rales or rhonchi  CV: S1, S2 normal.  No murmurs, rubs, or gallops. Abd: soft, not tender, not distended. No hepatosplenomegaly. Normal bowel sounds. Neuro: cranial nerves intact, normal strength and movement in all extremities, and sensation intact and symmetric. Skin: no lesion or rash  Extremities: no cyanosis, no pedal edema. There is some lymphedema bilateral lower legs. No results found for this visit on 05/09/22. Assessment/Plan:  Differential diagnosis and treatment options reviewed with patient who is in agreement with treatment plan as outlined below. ICD-10-CM ICD-9-CM    1. Primary hypertension  I10 401.9 amLODIPine (NORVASC) 2.5 mg tablet      METABOLIC PANEL, COMPREHENSIVE      CBC W/O DIFF      CBC W/O DIFF      METABOLIC PANEL, COMPREHENSIVE   2. Anxiety  F41.9 300.00 escitalopram oxalate (LEXAPRO) 5 mg tablet   3. Mood swings  R45.86 799.24 escitalopram oxalate (LEXAPRO) 5 mg tablet   4. Memory change  R41.3 780.93 escitalopram oxalate (LEXAPRO) 5 mg tablet   5. Episodes of staring  R40.4 780.02 escitalopram oxalate (LEXAPRO) 5 mg tablet   6. History of OCD (obsessive compulsive disorder)  Z86.59 V11.2 escitalopram oxalate (LEXAPRO) 5 mg tablet   7. Vitamin D deficiency  E55.9 268.9 VITAMIN D, 25 HYDROXY      VITAMIN D, 25 HYDROXY   8. Acquired hypothyroidism  E03.9 244.9 TSH 3RD GENERATION      T4, FREE      T4, FREE      TSH 3RD GENERATION   9. Screening, lipid  Z13.220 V77.91 LIPID PANEL      LIPID PANEL   10. Finger deformity, left  M20.002 736.20 REFERRAL TO ORTHOPEDICS     BP remains elevated. DASH diet discussed. Will monitor at home. Will add on low dose amlodipine and follow up in two weeks for nurse visit for BP recheck. Discussed lymphedema, RADHA stockings   Routine labs today  Refer to hand specialist for finger evaluation. Continue to work on memory exercises  Discussed BMI and healthy weight.  Encouraged patient to work to implement changes including diet high in raw fruits and vegetables, lean protein and good fats. Limit refined, processed carbohydrates and sugar. Encouraged regular exercise. Recommended regular cardiovascular exercise 3-6 times per week for 30-60 minutes daily. I have discussed the diagnosis with the patient and the intended plan as seen in the above orders. The patient has received an after-visit summary and questions were answered concerning future plans. I have discussed medication side effects and warnings with the patient as well. The patient verbalizes understanding and agreement with the plan.

## 2022-05-09 NOTE — PATIENT INSTRUCTIONS
Lymphedema: Care Instructions  Your Care Instructions     Lymphedema is fluid that builds up in the arms or legs. It is often caused by surgery to remove lymph nodes during cancer treatment, especially breast cancer surgery, which can cause fluid to build up in the arm. It can happen after radiation treatment to an area that involves lymph nodes. It also can be caused by a fractured bone or surgery to fix a fracture. And some medicines also can cause lymphedema. Some people get it for unknown reasons. Normally, lymph nodes trap bacteria and other substances as fluid flows through them. Then, the white cells in the body's defense, or immune, system can destroy the substances. But if there are few or no lymph nodes--or if the lymph system in an arm or leg has been damaged--fluid can build up in the affected arm or leg. You can take simple steps at home to help treat or prevent fluid buildup. Treatment may include raising the arm or leg to let gravity drain the fluid. You also can wear compression stockings or sleeves. Follow-up care is a key part of your treatment and safety. Be sure to make and go to all appointments, and call your doctor if you are having problems. It's also a good idea to know your test results and keep a list of the medicines you take. How can you care for yourself at home? · Wear a compression stocking or sleeve as your doctor suggests. It can help keep fluid from pooling in an arm or leg. Wear it during air travel. · Prop up the swollen arm or leg on a pillow anytime you sit or lie down. Try to keep it above the level of your heart. This will help reduce swelling. · Avoid crossing your legs if your legs are swollen. · Get some exercise on most days of the week. Increase the intensity of exercise slowly. Water aerobics can help reduce swelling by helping fluid move around. Wear your compression stocking or sleeve during exercise, but not during water exercise.   · See a physical therapist. He or she can teach you how to do self-massage to help fluid move around. You also can learn what activities would be best for you. · Keep your feet clean and wear clean socks or stockings every day. Check your feet often for signs of infection, such as redness or heat. Do not walk barefoot. · If you have had lymph nodes removed from under your arm:  ? Do not have blood drawn from the arm on the side of the lymph node surgery. ? Do not allow a blood pressure cuff to be placed on that arm. If you are in the hospital, make sure your nurse and other hospital staff know of your condition. ? Wear gloves when gardening or doing other activities that may lead to cuts on your fingers or hands. · If you have had lymph nodes removed from your groin:  ? Bathe your feet daily in lukewarm, not hot, water. Use a mild soap that has a moisturizer, or use a moisturizer separately. ? Check your feet for blisters or cuts. ? Wear comfortable and supportive shoes that fit properly. ? Wear the correct size of panty hose and stockings. Avoid garters or knee-high or thigh-high stockings. · Ask your doctor how to treat any cuts, scratches, insect bites, or other injuries that may occur. · Use sunscreen and insect repellent when outdoors to protect your skin from sunburn and insect bites. · Wear medical alert jewelry that says you have lymphedema. You can buy these at most drugstores and on the Internet. When should you call for help? Call your doctor now or seek immediate medical care if:    · You have signs of infection, such as:  ? Increased pain, swelling, warmth, or redness. ? Red streaks leading from the area. ? Pus draining from the area. ? A fever. Watch closely for changes in your health, and be sure to contact your doctor if:    · You have new or worse symptoms from lymphedema.     · You do not get better as expected. Where can you learn more?   Go to http://www.gray.com/  Enter V398 in the search box to learn more about \"Lymphedema: Care Instructions. \"  Current as of: September 8, 2021               Content Version: 13.2  © 4711-7145 [employer]. Care instructions adapted under license by [employer] (which disclaims liability or warranty for this information). If you have questions about a medical condition or this instruction, always ask your healthcare professional. [address] any warranty or liability for your use of this information. DASH Diet: Care Instructions  Your Care Instructions     The DASH diet is an eating plan that can help lower your blood pressure. DASH stands for Dietary Approaches to Stop Hypertension. Hypertension is high blood pressure. The DASH diet focuses on eating foods that are high in calcium, potassium, and magnesium. These nutrients can lower blood pressure. The foods that are highest in these nutrients are fruits, vegetables, low-fat dairy products, nuts, seeds, and legumes. But taking calcium, potassium, and magnesium supplements instead of eating foods that are high in those nutrients does not have the same effect. The DASH diet also includes whole grains, fish, and poultry. The DASH diet is one of several lifestyle changes your doctor may recommend to lower your high blood pressure. Your doctor may also want you to decrease the amount of sodium in your diet. Lowering sodium while following the DASH diet can lower blood pressure even further than just the DASH diet alone. Follow-up care is a key part of your treatment and safety. Be sure to make and go to all appointments, and call your doctor if you are having problems. It's also a good idea to know your test results and keep a list of the medicines you take. How can you care for yourself at home? Following the DASH diet  · Eat 4 to 5 servings of fruit each day.  A serving is 1 medium-sized piece of fruit, ½ cup chopped or canned fruit, 1/4 cup dried fruit, or 4 ounces (½ cup) of fruit juice. Choose fruit more often than fruit juice. · Eat 4 to 5 servings of vegetables each day. A serving is 1 cup of lettuce or raw leafy vegetables, ½ cup of chopped or cooked vegetables, or 4 ounces (½ cup) of vegetable juice. Choose vegetables more often than vegetable juice. · Get 2 to 3 servings of low-fat and fat-free dairy each day. A serving is 8 ounces of milk, 1 cup of yogurt, or 1 ½ ounces of cheese. · Eat 6 to 8 servings of grains each day. A serving is 1 slice of bread, 1 ounce of dry cereal, or ½ cup of cooked rice, pasta, or cooked cereal. Try to choose whole-grain products as much as possible. · Limit lean meat, poultry, and fish to 2 servings each day. A serving is 3 ounces, about the size of a deck of cards. · Eat 4 to 5 servings of nuts, seeds, and legumes (cooked dried beans, lentils, and split peas) each week. A serving is 1/3 cup of nuts, 2 tablespoons of seeds, or ½ cup of cooked beans or peas. · Limit fats and oils to 2 to 3 servings each day. A serving is 1 teaspoon of vegetable oil or 2 tablespoons of salad dressing. · Limit sweets and added sugars to 5 servings or less a week. A serving is 1 tablespoon jelly or jam, ½ cup sorbet, or 1 cup of lemonade. · Eat less than 2,300 milligrams (mg) of sodium a day. If you limit your sodium to 1,500 mg a day, you can lower your blood pressure even more. · Be aware that all of these are the suggested number of servings for people who eat 1,800 to 2,000 calories a day. Your recommended number of servings may be different if you need more or fewer calories. Tips for success  · Start small. Do not try to make dramatic changes to your diet all at once. You might feel that you are missing out on your favorite foods and then be more likely to not follow the plan. Make small changes, and stick with them.  Once those changes become habit, add a few more changes. · Try some of the following:  ? Make it a goal to eat a fruit or vegetable at every meal and at snacks. This will make it easy to get the recommended amount of fruits and vegetables each day. ? Try yogurt topped with fruit and nuts for a snack or healthy dessert. ? Add lettuce, tomato, cucumber, and onion to sandwiches. ? Combine a ready-made pizza crust with low-fat mozzarella cheese and lots of vegetable toppings. Try using tomatoes, squash, spinach, broccoli, carrots, cauliflower, and onions. ? Have a variety of cut-up vegetables with a low-fat dip as an appetizer instead of chips and dip. ? Sprinkle sunflower seeds or chopped almonds over salads. Or try adding chopped walnuts or almonds to cooked vegetables. ? Try some vegetarian meals using beans and peas. Add garbanzo or kidney beans to salads. Make burritos and tacos with mashed mendiola beans or black beans. Where can you learn more? Go to http://www.erazo.com/  Enter H967 in the search box to learn more about \"DASH Diet: Care Instructions. \"  Current as of: January 10, 2022               Content Version: 13.2  © 1273-2138 [employer]. Care instructions adapted under license by [employer] (which disclaims liability or warranty for this information). If you have questions about a medical condition or this instruction, always ask your healthcare professional. [place] [address] any warranty or liability for your use of this information.

## 2022-05-10 DIAGNOSIS — E03.9 ACQUIRED HYPOTHYROIDISM: ICD-10-CM

## 2022-05-10 RX ORDER — LEVOTHYROXINE SODIUM 125 UG/1
125 TABLET ORAL
Qty: 90 TABLET | Refills: 1 | Status: SHIPPED | OUTPATIENT
Start: 2022-05-10 | End: 2022-08-10

## 2022-05-10 NOTE — PROGRESS NOTES
Please let patient know that her labs are back. Her thyroid level is off, will need to decrease her levothyroxine dose a little and have her repeat labs in 8 weeks. Rest of her labs look good.

## 2022-05-23 ENCOUNTER — CLINICAL SUPPORT (OUTPATIENT)
Dept: FAMILY MEDICINE CLINIC | Age: 77
End: 2022-05-23

## 2022-05-23 VITALS — OXYGEN SATURATION: 98 % | DIASTOLIC BLOOD PRESSURE: 76 MMHG | HEART RATE: 55 BPM | SYSTOLIC BLOOD PRESSURE: 145 MMHG

## 2022-05-23 DIAGNOSIS — I10 PRIMARY HYPERTENSION: Primary | ICD-10-CM

## 2022-05-23 NOTE — PROGRESS NOTES
Neurology Note    Patient ID:  Kelli Rg  [id number]  42 y.o.  1945      Date of Consultation:  May 24, 2022    Referring Physician: Dr. Colin Louis    Reason for Consultation:  Cognitive concerns      Assessment and Plan:    The patient is a pleasant 79-year-old female who presents with cognitive concerns. Her examination did reveal a Stepan cognitive assessment score of 20 out of 30. Memory loss/mild cognitive impairment:  The differential for this does include metabolic, degenerative (alzheimer vs vascular)  I will obtain serology including a vitamin B12 looking for possible reversible causes of memory loss. Head CT from earlier this year was reviewed and there was no space-occupying lesion. I will obtain neurocognitive testing to help better determine and degree and impact of cognitive impairment. Given the cognitive decline that I saw on the visit today, I will start her on Aricept 5 mg daily. Side effects and toxicity of the medication were reviewed. After above testing, will consider increasing dosing to 10 mg.  I discussed with the patient and family members in regards to the patient's cognitive limitations and need to ensure patient safety. Attempts to minimize opportunities of harm is important. Activities to be monitored, or avoided, would include cooking, ironing clothes, financial bill payments. Having structure to a day is important for the patient. Cognitive and physical activities should be considered daily. Hypertension  Dyslipidemia           Subjective: my memory     History of Present Illness:   Kelli Rg is a 78 y.o. female who was referred to the neurology clinic at [facility] for an evaluation. The patient does present with her  today who provides additional information due to her memory concerns    The patient reports that the reason she is here are due to her sister and her daughter being concerned about her memory.   They have noted decline over the past 2 years. Her  has begun to fill in blanks when she is having word finding difficulty. He has been doing this over the past year. Her  who is with her today, has noticed some of these memory concerns. He has noticed that she has lost the ability to plan and coordinate activities such as getting meals together. She still is quite active and does all the laundry and cleaning around the house. Her  does all the bills. She used to do a lot more of this. She does provide some support at times. She does get a good night sleep. She sleeps with 7 to 8 hours asleep at night. There is no dedicated exercise program or daily cognitively stimulating activity. The patient does not recognize some of the cognitive concerns that her  has mentioned. I was able to independently review the head CT performed on January 13, 2022. Head CT was normal.  CTA revealed no large vessel flow-limiting stenosis or any significant intracranial stenosis. Pertinent laboratory results that I was able to review include a TSH slightly low at 0.31. LDL of 65, LFTs normal.  Creatinine normal.  These blood tests were all performed on May 9, 2022.   Past Medical History:   Diagnosis Date    Anxiety     Arrhythmia     Arthritis     Breast cancer ([facility] [address]     left side     GERD (gastroesophageal reflux disease)     High cholesterol     HTN (hypertension)     Hypothyroid     Memory deficit     Shingles outbreak         Past Surgical History:   Procedure Laterality Date    COLONOSCOPY N/A 6/7/2019    COLONOSCOPY performed by Josué Lyn MD at [facility] ENDOSCOPY    HX BREAST LUMPECTOMY Right 12/16/2021    RIGHT BREAST LUMPECTOMY WITH ULTRASOUND AND RIGHT BREAST SENTINEL NODE BIOPSY performed by Warden Eisenmenger, MD at [facility] AMBULATORY OR    HX GYN      hysterectomy    HX GYN      vaginal birth x 1        Family History   Problem Relation Age of Onset    Hypertension Sister  Stroke Mother     Cancer Father         Social History     Tobacco Use    Smoking status: Never Smoker    Smokeless tobacco: Never Used   Substance Use Topics    Alcohol use: No        Allergies   Allergen Reactions    Pneumococcal 23-Roxanne Ps Vaccine Swelling        Prior to Admission medications    Medication Sig Start Date End Date Taking? Authorizing Provider   escitalopram oxalate (LEXAPRO) 5 mg tablet Take 1 Tablet by mouth daily. 5/9/22  Yes Brittanie Floyd NP   amLODIPine (NORVASC) 2.5 mg tablet Take 1 Tablet by mouth daily. 5/9/22  Yes Brittanie Floyd NP   alendronate (FOSAMAX) 70 mg tablet TAKE 1 TABLET EVERY 7 DAYS 2/11/22  Yes Molly Caceres MD   coenzyme q10 (Co Q-10) 10 mg cap Take  by mouth. Yes Provider, Historical   ascorbic acid, vitamin C, (Vitamin C) 500 mg tablet Take  by mouth. Yes Provider, Historical   b complex-vitamin c-folic acid (NEPHROCAPS) 1 mg capsule Take 1 Capsule by mouth daily. Yes Provider, Historical   aspirin 81 mg chewable tablet Take 81 mg by mouth daily. Yes Provider, Historical   pantoprazole (PROTONIX) 40 mg tablet TAKE 1 TABLET DAILY 1/25/22  Yes Molly Caceres MD   fexofenadine HCl (ALLEGRA PO) Take  by mouth. BID   Yes Provider, Historical   letrozole (FEMARA) 2.5 mg tablet Take 1 Tablet by mouth daily. 1/5/22  Yes Suzette BERUMEN NP   atorvastatin (LIPITOR) 40 mg tablet TAKE 1 TABLET NIGHTLY FOR CHOLESTEROL 10/27/21  Yes Molly Caceres MD   atenoloL (TENORMIN) 25 mg tablet TAKE 2 TABLETS DAILY 10/15/21  Yes Molly Caceres MD   hydroCHLOROthiazide (HYDRODIURIL) 12.5 mg tablet TAKE 1 TABLET DAILY 8/9/21  Yes Brandan Ann MD   lutein 20 mg tab Take 20 mg by mouth daily. Yes Provider, Historical   cholecalciferol, vitamin d3, 400 unit cap Take  by mouth. Yes Provider, Historical   levothyroxine (SYNTHROID) 125 mcg tablet Take 1 Tablet by mouth Daily (before breakfast).  5/10/22   Brittanie Floyd NP       Review of Systems:    General, constitutional: negative  Eyes, vision: negative  Ears, nose, throat: negative  Cardiovascular, heart: negative  Respiratory: negative  Gastrointestinal: negative  Genitourinary: negative  Musculoskeletal: negative  Skin and integumentary: negative  Psychiatric: negative  Endocrine: negative  Neurological: negative, except for HPI  Hematologic/lymphatic: negative  Allergy/immunology: negative      Objective:     Visit Vitals  BP (!) 160/80 (BP Patient Position: Sitting, BP Cuff Size: Adult) Comment: lt/rt/reg   Pulse 66   Temp 97.5 °F (36.4 °C) (Temporal)   Resp 16   Ht 5' 4\" (1.626 m)   Wt 123 lb (55.8 kg) Comment: per patient   LMP 01/06/1996 (Exact Date)   SpO2 99%   BMI 21.11 kg/m²       Physical Exam:    General:  appears well nourished in no acute distress  Neck: no carotid bruits  Lungs: clear to auscultation  Heart:  no murmurs, regular rate  Lower extremity: peripheral pulses palpable and no edema  Skin: intact    Neurological exam:    She was awake and alert. She did not know the year and the month. She did know the day of the week. She did not know the floor the other. Unremarkable cognitive assessment score she had a difficult time connecting the dots, drawing a cube or putting hands on the clock. She did know 2 out of 3 animals. She was also able to register 5 objects but could remember none of them at 5 minutes. She could do mathematics. She could name 6 words a begin with the letter F in 1 minute. [place] was 20/30    Cranial nerves:   II-XII were tested    Perrrla  Fundoscopic examination revealed venous pulsations and no clear abnormalities  Visual fields were full  Eomi, no evidence of nystagmus  Facial sensation:  normal and symmetric  Facial motor: normal and symmetric  Hearing intact  SCM strength intact  Tongue: midline without fasciculations    Motor: Tone normal  Pronator drift was absent  No evidence of fasciculations    Strength testing:   deltoid triceps biceps Wrist ext.  Wrist flex. intrinsics Hip flex. Hip ext. Knee ext. Knee flex Dorsi flex Plantar flex   Right 5 5 5 5 5 5 5 5 5 5 5 5   Left 5 5 5 5 5 5 5 5 5 5 5 5         Sensory:  Upper extremity: intact to pp, light touch, and vibration > 10 seconds  Lower extremity: intact to pp, light touch, and vibration > 10 seconds    Reflexes:    Right Left  Biceps  2 2  Triceps 2 2  Brachiorad. 2 2  Patella  2 2  Achilles 2 2    Plantar response:  flexor bilaterally    Cerebellar testing:  no tremor apparent, finger/nose and brandee were intact    Romberg: absent    Gait: steady. Labs:     Lab Results   Component Value Date/Time    Hemoglobin A1c 5.4 12/07/2011 12:00 AM    Sodium 136 05/09/2022 01:55 PM    Potassium 4.2 05/09/2022 01:55 PM    Chloride 100 05/09/2022 01:55 PM    Glucose 92 05/09/2022 01:55 PM    BUN 14 05/09/2022 01:55 PM    Creatinine 0.80 05/09/2022 01:55 PM    Calcium 9.7 05/09/2022 01:55 PM    WBC 4.7 05/09/2022 01:55 PM    HCT 42.6 05/09/2022 01:55 PM    HGB 14.0 05/09/2022 01:55 PM    PLATELET 681 54/50/8383 01:55 PM       Imaging:    Results from Hospital Encounter encounter on 11/10/21    MRI BREAST BI W WO CONT    Narrative  EXAM:  MRI BREAST BI W WO CONT    INDICATION: New diagnosis of right breast carcinoma in the 10:00 position. Evaluate extent of disease. COMPARISON: Right breast ultrasound on 10/6/2021. Mammography on 9/13/2021 and  10/6/2021. No comparison MRI. EXAM: MRI of right and left breast without and with IV contrast Gadavist .    TECHNIQUE: MRI breast without and with IV contrast. Bilateral breast MRI was  performed using a dedicated breast coil without compression with the patient in  the prone position. Precontrast T1-weighted images with fat suppression were  obtained followed by bolus injection of 5 mL of Gadavist  performed on the 3  [place]na magnet. Postcontrast dynamic and high-resolution images were acquired. Axial T2 fat-saturated imaging was also performed.  The images were analyzed  using CAD analysis, enhancement curves, digital subtraction, and 2 and 3  dimensional reconstructions. FINDINGS:    Background parenchymal enhancement: Mild. Lymph nodes: No lymphadenopathy. Right breast: 0.6 x 0.6 x 0.5 cm mildly irregular mass with persistent kinetics  is in the posterior third in the 11:00 position. Biopsy clip is at the posterior  margin of the mass. No extension to chest wall or skin. In the posterior third of the 9:00 position, an oval mass measures 0.6 x 0.4 x  0.3 cm. Persistent kinetics. No T2 characteristic. No adjacent blood vessel. This finding is located 2.4 cm inferior to the biopsy-proven carcinoma. There is  no correlate on the comparison mammograms. No other suspicious morphology or enhancement in the right breast. Normal skin  and nipple. Left breast: There is no suspicious focus of morphology or temporal enhancement. Normal skin and nipple. Miscellaneous: None. Impression  1. 0.6 cm biopsy-proven carcinoma in the posterior third of the right breast at  11:00.  2. 0.6 cm suspicious mass in the 9:00 position of the posterior third of the  right breast is located 2.4 cm inferior to the biopsy-proven carcinoma. 3. No MRI evidence of malignancy in the left breast.  4. No lymphadenopathy. Assessment: ACR BI-RADS category  Right breast: BI-RADS Assessment Category 6: Known biopsy proven malignancy-  Appropriate action should be taken. Left breast: BI-RADS Assessment Category 1: Negative. Recommendation: Second look ultrasound of the right breast in the 9:00 position. Depending on results, ultrasound-guided or MRI guided biopsy of suspicious  subcentimeter mass in the right breast in the 9:00 position. A negative breast MRI examination speaks strongly against invasive cancer down  to a detection threshold of 3 to 5 mm but may not detect some lower grade or in  situ carcinomas. Therefore, routine clinical and mammographic followup are  recommended.   A summary portfolio has been created in PACS. EPIC email sent to Dr. Fouzia Jones at the time of the dictation. Results from [facility] encounter on 01/13/22    CTA HEAD NECK W CONT    Narrative  INDICATION:    EXAMINATION:  CT NECK AND CHEST WITH CONTRAST    COMPARISON: INDICATION:    EXAMINATION:  CT ANGIOGRAPHY HEAD AND NECK and CT HEAD    COMPARISON: None    TECHNIQUE:  Routine noncontrast axial head CT was performed. Sagittal and  coronal reconstructions were generated. Following the uneventful administration  of iodinated contrast material, axial CT angiography of the head and neck was  performed. Delayed axial images through the head were also obtained. Coronal and  sagittal reconstructions were obtained. Manual postprocessing of images was  performed. 3-D  Sagittal maximal intensity projection images were obtained. 3-D  Coronal maximal intensity projections were obtained. CT dose reduction was  achieved through use of a standardized protocol tailored for this examination  and automatic exposure control for dose modulation. FINDINGS:    CT HEAD:    Ventricles: Midline, no hydrocephalus. Intracranial Hemorrhage: None. Brain Parenchyma/Brainstem: Normal for age. Basal Cisterns: Normal.  Paranasal Sinuses: Visualized sinuses are clear. Additional Comments: N/A.    CTA NECK:    Slightly suboptimal timing of contrast bolus with large amount of contrast in  the left subclavian vein, IVC and pulmonary artery. Aortic Arch: No significant abnormality. Right Common Carotid Artery: No significant abnormality. Right Internal Carotid Artery: No significant abnormality. NASCET Right: 0-25%. Left Common Carotid Artery: No significant abnormality. Left Internal Carotid Artery: No significant abnormality. NASCET Left: 0-25%. .  Carotid stenosis determined using NASCET criteria. Right Vertebral Artery: No significant abnormality. Left Vertebral Artery: No significant abnormality.   Cervical Soft Tissues: No significant abnormality. Lung Apices: No significant abnormality. Bones: No destructive bone lesion. Additional Comments: Slight motion artifact at the level of the common carotid  bifurcations. CTA HEAD:    Posterior Circulation: No flow limiting stenosis or occlusion. Anterior Circulation: No flow limiting stenosis or occlusion. Additional Comments: No evidence of aneurysm or vascular malformation. Impression  No acute process. No large vessel occlusion, arterial dissection, or  flow-limiting stenosis. Cerebral perfusion not performed. Patient Active Problem List   Diagnosis Code    Hyperlipidemia E78.5    Hypothyroidism E03.9    Hypertension I10    PVCs (premature ventricular contractions) I49.3    Age-related osteoporosis without current pathological fracture M81.0      The patient should return to clinic after neuropsych testing    Renewed medication: Yes, side effects and toxicity were reviewed with the patient    I spent  60  minutes on the day of the encounter preparing the office visit by reviewing medical records, obtaining a history, performing examination, counseling and educating the patient and family member on diagnosis, ordering medications and tests, documenting in the clinical medical record, and coordinating the care for the patient. The patient had the ability to ask questions and all questions were answered.                  Signed By:  Riaz Self DO FAAN    May 24, 2022

## 2022-05-23 NOTE — PROGRESS NOTES
Chief Complaint   Patient presents with    Blood Pressure Check     Per Brittanie 2 week follow-up        Per Minerva pt and her  was told to keep pt on her current medications and follow-up in 1 month

## 2022-05-24 ENCOUNTER — OFFICE VISIT (OUTPATIENT)
Dept: NEUROLOGY | Age: 77
End: 2022-05-24
Payer: MEDICARE

## 2022-05-24 VITALS
RESPIRATION RATE: 16 BRPM | BODY MASS INDEX: 21 KG/M2 | HEIGHT: 64 IN | SYSTOLIC BLOOD PRESSURE: 160 MMHG | OXYGEN SATURATION: 99 % | HEART RATE: 66 BPM | TEMPERATURE: 97.5 F | WEIGHT: 123 LBS | DIASTOLIC BLOOD PRESSURE: 80 MMHG

## 2022-05-24 DIAGNOSIS — R41.3 MEMORY LOSS: Primary | ICD-10-CM

## 2022-05-24 DIAGNOSIS — G31.84 MCI (MILD COGNITIVE IMPAIRMENT): ICD-10-CM

## 2022-05-24 PROCEDURE — G8420 CALC BMI NORM PARAMETERS: HCPCS | Performed by: PSYCHIATRY & NEUROLOGY

## 2022-05-24 PROCEDURE — G8510 SCR DEP NEG, NO PLAN REQD: HCPCS | Performed by: PSYCHIATRY & NEUROLOGY

## 2022-05-24 PROCEDURE — 3017F COLORECTAL CA SCREEN DOC REV: CPT | Performed by: PSYCHIATRY & NEUROLOGY

## 2022-05-24 PROCEDURE — G8754 DIAS BP LESS 90: HCPCS | Performed by: PSYCHIATRY & NEUROLOGY

## 2022-05-24 PROCEDURE — G8427 DOCREV CUR MEDS BY ELIG CLIN: HCPCS | Performed by: PSYCHIATRY & NEUROLOGY

## 2022-05-24 PROCEDURE — G8536 NO DOC ELDER MAL SCRN: HCPCS | Performed by: PSYCHIATRY & NEUROLOGY

## 2022-05-24 PROCEDURE — G8753 SYS BP > OR = 140: HCPCS | Performed by: PSYCHIATRY & NEUROLOGY

## 2022-05-24 PROCEDURE — 1090F PRES/ABSN URINE INCON ASSESS: CPT | Performed by: PSYCHIATRY & NEUROLOGY

## 2022-05-24 PROCEDURE — 1101F PT FALLS ASSESS-DOCD LE1/YR: CPT | Performed by: PSYCHIATRY & NEUROLOGY

## 2022-05-24 PROCEDURE — 99205 OFFICE O/P NEW HI 60 MIN: CPT | Performed by: PSYCHIATRY & NEUROLOGY

## 2022-05-24 PROCEDURE — G9899 SCRN MAM PERF RSLTS DOC: HCPCS | Performed by: PSYCHIATRY & NEUROLOGY

## 2022-05-24 RX ORDER — DONEPEZIL HYDROCHLORIDE 5 MG/1
5 TABLET, FILM COATED ORAL
Qty: 90 TABLET | Refills: 3 | Status: SHIPPED | OUTPATIENT
Start: 2022-05-24 | End: 2022-06-17

## 2022-05-24 NOTE — PROGRESS NOTES
Chief Complaint   Patient presents with   [address]     Cognitive decline     Patient answered her questions her spouse & sister have been assisting with meals since her cancer treatment 9/ 2021

## 2022-05-24 NOTE — LETTER
5/24/2022    Patient: Arsenio Blanca   YOB: 1945   Date of Visit: 5/24/2022     Angy Lo MD  [address] [place] [address] [address]    Dear Angy Lo MD,      Thank you for referring Ms. Scotty Hinson to [address] [place] [address] for evaluation. My notes for this consultation are attached. If you have questions, please do not hesitate to call me. I look forward to following your patient along with you.       Sincerely,    Po Arguello, DO

## 2022-05-27 ENCOUNTER — TELEPHONE (OUTPATIENT)
Dept: NEUROLOGY | Age: 77
End: 2022-05-27

## 2022-05-27 NOTE — TELEPHONE ENCOUNTER
----- Message from Elodia Jorgensen DO sent at 5/24/2022  2:52 PM EDT -----  Your vitamin b12 level was normal.  Thanks.    Dr. Iqbal Members

## 2022-06-14 NOTE — PROGRESS NOTES
Mery Olmedo is a 78 y.o. female here for 3 month follow up for right breast cancer. Pt started Letrozole at last visit. States it is going well. No concerns brought up. She recently stopped Aricept due to diarrhea. 1. Have you been to the ER, urgent care clinic since your last visit? Hospitalized since your last visit? no  2. Have you seen or consulted any other health care providers outside of the [address] [place] [address] since your last visit? Include any pap smears or colon screening.   Dentist

## 2022-06-16 ENCOUNTER — OFFICE VISIT (OUTPATIENT)
Dept: ONCOLOGY | Age: 77
End: 2022-06-16
Payer: MEDICARE

## 2022-06-16 ENCOUNTER — TELEPHONE (OUTPATIENT)
Dept: NEUROLOGY | Age: 77
End: 2022-06-16

## 2022-06-16 ENCOUNTER — NURSE TRIAGE (OUTPATIENT)
Dept: OTHER | Facility: CLINIC | Age: 77
End: 2022-06-16

## 2022-06-16 VITALS
HEIGHT: 64 IN | BODY MASS INDEX: 21.21 KG/M2 | DIASTOLIC BLOOD PRESSURE: 71 MMHG | HEART RATE: 53 BPM | SYSTOLIC BLOOD PRESSURE: 138 MMHG | WEIGHT: 124.2 LBS | OXYGEN SATURATION: 98 % | TEMPERATURE: 98.1 F

## 2022-06-16 DIAGNOSIS — C50.411 MALIGNANT NEOPLASM OF UPPER-OUTER QUADRANT OF RIGHT BREAST IN FEMALE, ESTROGEN RECEPTOR POSITIVE (HCC): Primary | ICD-10-CM

## 2022-06-16 DIAGNOSIS — Z17.0 MALIGNANT NEOPLASM OF UPPER-OUTER QUADRANT OF RIGHT BREAST IN FEMALE, ESTROGEN RECEPTOR POSITIVE (HCC): Primary | ICD-10-CM

## 2022-06-16 PROCEDURE — G0463 HOSPITAL OUTPT CLINIC VISIT: HCPCS | Performed by: INTERNAL MEDICINE

## 2022-06-16 PROCEDURE — 1090F PRES/ABSN URINE INCON ASSESS: CPT | Performed by: INTERNAL MEDICINE

## 2022-06-16 PROCEDURE — 1123F ACP DISCUSS/DSCN MKR DOCD: CPT | Performed by: INTERNAL MEDICINE

## 2022-06-16 PROCEDURE — G8536 NO DOC ELDER MAL SCRN: HCPCS | Performed by: INTERNAL MEDICINE

## 2022-06-16 PROCEDURE — G8754 DIAS BP LESS 90: HCPCS | Performed by: INTERNAL MEDICINE

## 2022-06-16 PROCEDURE — G8427 DOCREV CUR MEDS BY ELIG CLIN: HCPCS | Performed by: INTERNAL MEDICINE

## 2022-06-16 PROCEDURE — 99213 OFFICE O/P EST LOW 20 MIN: CPT | Performed by: INTERNAL MEDICINE

## 2022-06-16 PROCEDURE — G8432 DEP SCR NOT DOC, RNG: HCPCS | Performed by: INTERNAL MEDICINE

## 2022-06-16 PROCEDURE — G8420 CALC BMI NORM PARAMETERS: HCPCS | Performed by: INTERNAL MEDICINE

## 2022-06-16 PROCEDURE — G8752 SYS BP LESS 140: HCPCS | Performed by: INTERNAL MEDICINE

## 2022-06-16 PROCEDURE — 1101F PT FALLS ASSESS-DOCD LE1/YR: CPT | Performed by: INTERNAL MEDICINE

## 2022-06-16 PROCEDURE — 3017F COLORECTAL CA SCREEN DOC REV: CPT | Performed by: INTERNAL MEDICINE

## 2022-06-16 NOTE — PROGRESS NOTES
[address] [facility] [address], [address] [facility], [address]  [phone]      Oncology Note        Patient: Eugene Espinosa MRN: [id number]  SSN: [id number]    YOB: 1945  Age: 78 y.o. Sex: female        Diagnosis:     1. Right breast carcinoma: Dx: 10/2021  T1c N0 (Stage I) invasive lobular carcinoma, Tumor size 15 mm, LN -ve, grade 1, ER 99%, HI 0%, Her 2 2+, FISH -ve. DCIS present. Treatment:     1. S/P right breast lumpectomy 12/15/2021  2. Completed adjuvant radiation 02/21/2022  3. Adjuvant Letrozole, start 03/07/2022    Subjective:      Eugene Espinosa is a 78 y.o. female who I am seeing for a new diagnosis of right sided invasive lobular carcinoma. She underwent a routine screening mammogram. An abnormal mammogram led to a biopsy which revealed invasive lobular carcinoma. She then underwent a right breast lumpectomy 12/16/2021. She completed adjuvant radiation in Feb 2022. Review of Systems:    Constitutional: negative  Eyes: negative  Ears, Nose, Mouth, Throat, and Face: negative  Respiratory: negative  Cardiovascular: negative  Gastrointestinal: negative  Genitourinary:negative  Integument/Breast: negative  Hematologic/Lymphatic: negative  Musculoskeletal:negative  Neurological: negative    Review of systems was reviewed and updated as needed on 06/16/22.       Past Medical History:   Diagnosis Date    Anxiety     Arrhythmia     Arthritis     Breast cancer ([address]     left side     GERD (gastroesophageal reflux disease)     High cholesterol     HTN (hypertension)     Hypothyroid     Memory deficit     Shingles outbreak      Past Surgical History:   Procedure Laterality Date    COLONOSCOPY N/A 6/7/2019    COLONOS[facility] Charmayne Aw, [facility] at [facility] ENDOSCOPY    HX BREAST LUMPECTOMY Right 12/16/2021    RIGHT BREAST LUMPECTOMY WITH ULTRASOUND AND RIGHT BREAST SENTINEL NODE BIOPSY performed by Nolberto Leo MD at [facility] AMBULATORY OR    HX GYN      hysterectomy    HX GYN      vaginal birth x 1      Family History   Problem Relation Age of Onset    Hypertension Sister     Stroke Mother     Cancer Father      Social History     Tobacco Use    Smoking status: Never Smoker    Smokeless tobacco: Never Used   Substance Use Topics    Alcohol use: No      Prior to Admission medications    Medication Sig Start Date End Date Taking? Authorizing Provider   levothyroxine (SYNTHROID) 125 mcg tablet Take 1 Tablet by mouth Daily (before breakfast). 5/10/22  Yes Brittanie Floyd NP   escitalopram oxalate (LEXAPRO) 5 mg tablet Take 1 Tablet by mouth daily. 5/9/22  Yes Brittanie Floyd NP   amLODIPine (NORVASC) 2.5 mg tablet Take 1 Tablet by mouth daily. 5/9/22  Yes Brittanie Floyd NP   alendronate (FOSAMAX) 70 mg tablet TAKE 1 TABLET EVERY 7 DAYS 2/11/22  Yes Fei Kline MD   coenzyme q10 (Co Q-10) 10 mg cap Take  by mouth. Yes Provider, Historical   ascorbic acid, vitamin C, (Vitamin C) 500 mg tablet Take  by mouth. Yes Provider, Historical   b complex-vitamin c-folic acid (NEPHROCAPS) 1 mg capsule Take 1 Capsule by mouth daily. Yes Provider, Historical   aspirin 81 mg chewable tablet Take 81 mg by mouth daily. Yes Provider, Historical   pantoprazole (PROTONIX) 40 mg tablet TAKE 1 TABLET DAILY 1/25/22  Yes Fei Kline MD   fexofenadine HCl (ALLEGRA PO) Take  by mouth. BID   Yes Provider, Historical   letrozole (FEMARA) 2.5 mg tablet Take 1 Tablet by mouth daily. 1/5/22  Yes Panchito BERUMEN NP   atorvastatin (LIPITOR) 40 mg tablet TAKE 1 TABLET NIGHTLY FOR CHOLESTEROL 10/27/21  Yes Fei Kline MD   atenoloL (TENORMIN) 25 mg tablet TAKE 2 TABLETS DAILY 10/15/21  Yes Fei Kline MD   hydroCHLOROthiazide (HYDRODIURIL) 12.5 mg tablet TAKE 1 TABLET DAILY 8/9/21  Yes Sary Ann MD   lutein 20 mg tab Take 20 mg by mouth daily.    Yes Provider, Historical cholecalciferol, vitamin d3, 400 unit cap Take  by mouth. Yes Provider, Historical   donepeziL (ARICEPT) 5 mg tablet Take 1 Tablet by mouth nightly. Patient not taking: Reported on 6/16/2022 5/24/22   Sandi NGUYEN,               Allergies   Allergen Reactions    Pneumococcal 23-Roxanne Ps Vaccine Swelling           Objective:     Vitals:    06/16/22 1311   BP: 138/71   Pulse: (!) 53   Temp: 98.1 °F (36.7 °C)   TempSrc: Temporal   SpO2: 98%   Weight: 124 lb 3.2 oz (56.3 kg)   Height: 5' 4\" (1.626 m)            Physical Exam:    GENERAL: alert, cooperative, no distress, appears stated age  EYE: conjunctivae/corneas clear. LYMPHATIC: Cervical, supraclavicular, and axillary nodes normal.   THROAT & NECK: normal and no erythema or exudates noted. LUNG: clear to auscultation bilaterally  HEART: regular rate and rhythm  ABDOMEN: soft, non-tender. Bowel sounds normal. No masses,  no organomegaly  EXTREMITIES:  extremities normal, atraumatic, no cyanosis or edema  SKIN: Normal.  NEUROLOGIC: AOx3. Gait normal.    The above physical exam was reviewed and updated as needed on 06/16/22. Assessment:     1. Right breast carcinoma: Dx: 10/2021  T1c N0 (Stage I) invasive lobular carcinoma, Tumor size 15 mm, LN -ve, grade 1, ER 99%, OH 0%, Her 2 2+, FISH -ve. DCIS present. ECOG PS 0  Intent of Treatment - curative  Prognosis - excellent    S/P right breast lumpectomy 12/15/2021  Adjuvant radiation to the right breast on 03/21/2022  Letrozole. Tolerating treatment  In remission      2. Osteoporosis    On Fosamax. Plan:       1. Continue Letrozole. 2. Return in 6 months      Signed By: Jose Villa NP     June 16, 2022         I personally saw and evaluated the patient and performed the key components of medical decision making. The history, physical exam, and documentation were performed by Haris Shoemaker NP. I reviewed and verified the above documentation and modified it as needed.     Ms. Camelia Stuart is a women with early stage breast cancer. She is taking Letrozole and is doing well. I obtained history, performed physical exam, reviewed labs and imaging and communicated the treatment plan to the patient. Signed by: Jordyn Lee MD                     June 16, 2022        CC. Pranay Orr MD  CC.  Fredrick Scales MD

## 2022-06-16 NOTE — LETTER
6/16/2022    Patient: Jhonathan Ivy   YOB: 1945   Date of Visit: 6/16/2022     Silvia Ruboi MD  [address] [place] [address]  [facility] [address]    Dear Silvia Rubio MD,      Thank you for referring Ms. Yudi Mehta to [address] [place] [address] for evaluation. My notes for this consultation are attached. If you have questions, please do not hesitate to call me. I look forward to following your patient along with you.       Sincerely,    Zeke Harrison MD

## 2022-06-16 NOTE — TELEPHONE ENCOUNTER
Received call from [facility] at [facility] with Red Flag Complaint. Subjective: Caller states Nava Aguero is having frequent loose stools. 4 times over night. Has abdominal pain. \"     Current Symptoms:     Onset 1 week intermittent    Associated Symptoms: NA    Pain Severity: 5/10; dull; intermittent    Temperature: denies fever    What has been tried: Tums    LMP: NA Pregnant: NA    Recommended disposition: See in Office Today    Care advice provided, patient verbalizes understanding; denies any other questions or concerns; instructed to call back for any new or worsening symptoms. Patient/Caller agrees with recommended disposition; writer provided warm transfer to [facility] for appointment scheduling    Attention Provider: Thank you for allowing me to participate in the care of your patient. The patient was connected to triage in response to information provided to the [facility]. Please do not respond through this encounter as the response is not directed to a shared pool.     Reason for Disposition   MODERATE diarrhea (e.g., 4-6 times / day more than normal) and age > 79 years    Protocols used: DIARRHEA-ADULT-OH

## 2022-06-16 NOTE — TELEPHONE ENCOUNTER
is concerned that new Aricept script is causing some bowel issues. Wants to discuss possible dosage changes. Possibly lowering the dose and slowly increasing.  Please call [phone]

## 2022-06-17 RX ORDER — MEMANTINE HYDROCHLORIDE 10 MG/1
10 TABLET ORAL DAILY
Qty: 90 TABLET | Refills: 1 | Status: SHIPPED | OUTPATIENT
Start: 2022-06-17 | End: 2022-08-25 | Stop reason: ALTCHOICE

## 2022-06-17 NOTE — TELEPHONE ENCOUNTER
Pt's , Gena Boyle, called back to let Dr. Andrea De Anda know that Pt has stopped taking the Donezepil and did not have any bowel issues last night. Gena Montana would like a call back from Nurse to discuss what to do from here.  [phone]

## 2022-06-17 NOTE — TELEPHONE ENCOUNTER
Spouse informed on Hippa Verified patient with two identifiers. Will begin the Namenda once received from mail order and call if any adverse side effects.  Confirmed appt with PIPE Hanley 8/25/22 @ 900 am

## 2022-06-23 ENCOUNTER — OFFICE VISIT (OUTPATIENT)
Dept: FAMILY MEDICINE CLINIC | Age: 77
End: 2022-06-23
Payer: MEDICARE

## 2022-06-23 VITALS
HEIGHT: 64 IN | RESPIRATION RATE: 18 BRPM | WEIGHT: 123 LBS | DIASTOLIC BLOOD PRESSURE: 75 MMHG | OXYGEN SATURATION: 97 % | HEART RATE: 70 BPM | BODY MASS INDEX: 21 KG/M2 | TEMPERATURE: 98 F | SYSTOLIC BLOOD PRESSURE: 122 MMHG

## 2022-06-23 DIAGNOSIS — I10 PRIMARY HYPERTENSION: Primary | ICD-10-CM

## 2022-06-23 DIAGNOSIS — R41.3 MEMORY CHANGE: ICD-10-CM

## 2022-06-23 DIAGNOSIS — E03.9 ACQUIRED HYPOTHYROIDISM: ICD-10-CM

## 2022-06-23 DIAGNOSIS — Z86.59 HISTORY OF OCD (OBSESSIVE COMPULSIVE DISORDER): ICD-10-CM

## 2022-06-23 PROCEDURE — 1090F PRES/ABSN URINE INCON ASSESS: CPT | Performed by: NURSE PRACTITIONER

## 2022-06-23 PROCEDURE — G8752 SYS BP LESS 140: HCPCS | Performed by: NURSE PRACTITIONER

## 2022-06-23 PROCEDURE — G8420 CALC BMI NORM PARAMETERS: HCPCS | Performed by: NURSE PRACTITIONER

## 2022-06-23 PROCEDURE — 99214 OFFICE O/P EST MOD 30 MIN: CPT | Performed by: NURSE PRACTITIONER

## 2022-06-23 PROCEDURE — G8432 DEP SCR NOT DOC, RNG: HCPCS | Performed by: NURSE PRACTITIONER

## 2022-06-23 PROCEDURE — G8536 NO DOC ELDER MAL SCRN: HCPCS | Performed by: NURSE PRACTITIONER

## 2022-06-23 PROCEDURE — 3017F COLORECTAL CA SCREEN DOC REV: CPT | Performed by: NURSE PRACTITIONER

## 2022-06-23 PROCEDURE — G0463 HOSPITAL OUTPT CLINIC VISIT: HCPCS | Performed by: NURSE PRACTITIONER

## 2022-06-23 PROCEDURE — 1123F ACP DISCUSS/DSCN MKR DOCD: CPT | Performed by: NURSE PRACTITIONER

## 2022-06-23 PROCEDURE — G8754 DIAS BP LESS 90: HCPCS | Performed by: NURSE PRACTITIONER

## 2022-06-23 PROCEDURE — G8427 DOCREV CUR MEDS BY ELIG CLIN: HCPCS | Performed by: NURSE PRACTITIONER

## 2022-06-23 PROCEDURE — 1101F PT FALLS ASSESS-DOCD LE1/YR: CPT | Performed by: NURSE PRACTITIONER

## 2022-06-23 RX ORDER — GABAPENTIN 100 MG/1
100 CAPSULE ORAL 3 TIMES DAILY
COMMUNITY
End: 2022-06-23 | Stop reason: ALTCHOICE

## 2022-06-23 NOTE — PROGRESS NOTES
Subjective:     Chief Complaint   Patient presents with    Follow-up        HPI:  78 y.o.  presents for follow up appointment. At last appointment in May her thyroid level was a little off and I decreased her thyroid dose then. Also her BP was elevated then and I added on low dose amlodipine for BP control, here for follow up. No dizziness, no palpitations, no swelling. Was seen by DR Anastacia Ballard (neurology) for memory impairment. Started on Aricept but says she started having diarrhea so had to stop. She called Dr Anastacia Ballard and he stopped it and is starting her on namenda but she has not received that yet in the mail. Feels that her moods and anxiety are stable on lexapro. Taking 5mg daily. Denies SI/HI       Was seen by heme/onc 6/16/22 for right breast cancer follow up   Treatment:   1. S/P right breast lumpectomy 12/15/2021  2. Completed adjuvant radiation 02/21/2022  3. Adjuvant Letrozole, start 03/07/2022    A/P  S/P right breast lumpectomy 12/15/2021  Adjuvant radiation to the right breast on 03/21/2022  On Letrozole. -Tolerating treatment  In remission    Will follow up with heme/onc routine follow up in 6 months        No hospital, ER or specialist visits since last primary care visit except as noted above. Past Medical History:   Diagnosis Date    Anxiety     Arrhythmia     Arthritis     Breast cancer ([facility] [address]     left side     GERD (gastroesophageal reflux disease)     High cholesterol     HTN (hypertension)     Hypothyroid     Memory deficit     Shingles outbreak        Social History     Tobacco Use    Smoking status: Never Smoker    Smokeless tobacco: Never Used   Vaping Use    Vaping Use: Never used   Substance Use Topics    Alcohol use: No    Drug use: Never       Outpatient Medications Marked as Taking for the 6/23/22 encounter (Office Visit) with Brittanie Floyd NP   Medication Sig Dispense Refill    memantine (Namenda) 10 mg tablet Take 1 Tablet by mouth daily.  80 Tablet 1    levothyroxine (SYNTHROID) 125 mcg tablet Take 1 Tablet by mouth Daily (before breakfast). 90 Tablet 1    escitalopram oxalate (LEXAPRO) 5 mg tablet Take 1 Tablet by mouth daily. 90 Tablet 1    amLODIPine (NORVASC) 2.5 mg tablet Take 1 Tablet by mouth daily. 90 Tablet 1    alendronate (FOSAMAX) 70 mg tablet TAKE 1 TABLET EVERY 7 DAYS 12 Tablet 3    coenzyme q10 (Co Q-10) 10 mg cap Take  by mouth.  ascorbic acid, vitamin C, (Vitamin C) 500 mg tablet Take  by mouth.  b complex-vitamin c-folic acid (NEPHROCAPS) 1 mg capsule Take 1 Capsule by mouth daily.  aspirin 81 mg chewable tablet Take 81 mg by mouth daily.  pantoprazole (PROTONIX) 40 mg tablet TAKE 1 TABLET DAILY 90 Tablet 3    fexofenadine HCl (ALLEGRA PO) Take  by mouth. BID      letrozole (FEMARA) 2.5 mg tablet Take 1 Tablet by mouth daily. 90 Tablet 2    atorvastatin (LIPITOR) 40 mg tablet TAKE 1 TABLET NIGHTLY FOR CHOLESTEROL 90 Tablet 3    atenoloL (TENORMIN) 25 mg tablet TAKE 2 TABLETS DAILY 180 Tablet 3    hydroCHLOROthiazide (HYDRODIURIL) 12.5 mg tablet TAKE 1 TABLET DAILY 90 Tablet 3    lutein 20 mg tab Take 20 mg by mouth daily.  cholecalciferol, vitamin d3, 400 unit cap Take  by mouth. Allergies   Allergen Reactions    Aricept [Donepezil] Diarrhea    Gabapentin Diarrhea     Pt had severe diarrhea for 2 days after starting      Pneumococcal 23-Roxanne Ps Vaccine Swelling       Health Maintenance reviewed.       ROS:  Gen: no fatigue, no fever, no chills, no unexplained weight loss or weight gain  Eyes: no excessive tearing, itching, or discharge  Nose: no rhinorrhea, no sinus pain  Mouth: no oral lesions, no sore throat, no difficulty swallowing  Resp: no shortness of breath, no wheezing, no cough  CV: no chest pain, no orthopnea, no paroxysmal nocturnal dyspnea, no lower extremity edema, no palpitations  Abd: no nausea, no heartburn, no diarrhea, no constipation, no abdominal pain  Neuro: no headaches, no syncope or presyncopal episodes  Endo: no polyuria, no polydipsia. : no hematuria, no dysuria, no frequency, no incontinence  Heme: no lymphadenopathy, no easy bruising or bleeding, no night sweats  MSK: no joint pain or swelling    PE:  Visit Vitals  /75 (BP 1 Location: Right upper arm, BP Patient Position: Sitting, BP Cuff Size: Adult)   Pulse 70   Temp 98 °F (36.7 °C) (Temporal)   Resp 18   Ht 5' 4\" (1.626 m)   Wt 123 lb (55.8 kg)   LMP 01/06/1996 (Exact Date)   SpO2 97%   BMI 21.11 kg/m²     Gen: alert, oriented, no acute distress  Head: normocephalic, atraumatic  Eyes: pupils equal round reactive to light, sclera clear, conjunctiva clear  Neck: symmetric normal sized thyroid, no carotid bruits, no jugular vein distention  Resp: no increase work of breathing, lungs clear to ausculation bilaterally, no wheezing, rales or rhonchi  CV: S1, S2 normal.  No murmurs, rubs, or gallops. Abd: soft, not tender, not distended. No hepatosplenomegaly. Normal bowel sounds. No hernias. No abdominal or renal bruits. Neuro: cranial nerves intact, normal strength and movement in all extremities, and sensation intact and symmetric. Skin: no lesion or rash  Extremities: no cyanosis or edema    No results found for this visit on 06/23/22. Assessment/Plan:  Differential diagnosis and treatment options reviewed with patient who is in agreement with treatment plan as outlined below. ICD-10-CM ICD-9-CM    1. Primary hypertension  I10 401.9    2. Memory change  R41.3 780.93    3. Acquired hypothyroidism  E03.9 244.9    4. History of OCD (obsessive compulsive disorder)  Z86.59 V11.2      Doing well. BP at goal. No change in therapy  Doing fine on levothyroxine change, will recheck levels at follow up in August.  Will continue to follow with neurology and heme/onc as planned. Discussed BMI and healthy weight.  Encouraged patient to work to implement changes including diet high in raw fruits and vegetables, lean protein and good fats. Limit refined, processed carbohydrates and sugar. Encouraged regular exercise. Recommended regular cardiovascular exercise 3-6 times per week for 30-60 minutes daily. Removed gabapentin from her list of medicine, has not been on that medicine for a long time, was on post op for pain after breast surgery. I have discussed the diagnosis with the patient and the intended plan as seen in the above orders. The patient has received an after-visit summary and questions were answered concerning future plans. I have discussed medication side effects and warnings with the patient as well. The patient verbalizes understanding and agreement with the plan.

## 2022-06-23 NOTE — PROGRESS NOTES
Chief Complaint   Patient presents with    Follow-up     1. Have you been to the ER, urgent care clinic since your last visit? Hospitalized since your last visit? no    2. Have you seen or consulted any other health care providers outside of the [address] [place] [address] since your last visit? Include any pap smears or colon screening.  no      Health Maintenance Due   Topic Date Due    DTaP/Tdap/Td series (1 - Tdap) Never done

## 2022-06-24 RX ORDER — HYDROCHLOROTHIAZIDE 12.5 MG/1
TABLET ORAL
Qty: 90 TABLET | Refills: 3 | Status: SHIPPED | OUTPATIENT
Start: 2022-06-24

## 2022-07-05 PROBLEM — C50.911 BREAST CANCER, RIGHT (HCC): Status: ACTIVE | Noted: 2022-07-05

## 2022-07-06 ENCOUNTER — OFFICE VISIT (OUTPATIENT)
Dept: SURGERY | Age: 77
End: 2022-07-06
Payer: MEDICARE

## 2022-07-06 DIAGNOSIS — Z17.0 MALIGNANT NEOPLASM OF UPPER-OUTER QUADRANT OF RIGHT BREAST IN FEMALE, ESTROGEN RECEPTOR POSITIVE (HCC): Primary | ICD-10-CM

## 2022-07-06 DIAGNOSIS — C50.411 MALIGNANT NEOPLASM OF UPPER-OUTER QUADRANT OF RIGHT BREAST IN FEMALE, ESTROGEN RECEPTOR POSITIVE (HCC): Primary | ICD-10-CM

## 2022-07-06 PROCEDURE — G8420 CALC BMI NORM PARAMETERS: HCPCS | Performed by: SURGERY

## 2022-07-06 PROCEDURE — 1123F ACP DISCUSS/DSCN MKR DOCD: CPT | Performed by: SURGERY

## 2022-07-06 PROCEDURE — 1101F PT FALLS ASSESS-DOCD LE1/YR: CPT | Performed by: SURGERY

## 2022-07-06 PROCEDURE — 3017F COLORECTAL CA SCREEN DOC REV: CPT | Performed by: SURGERY

## 2022-07-06 PROCEDURE — G8432 DEP SCR NOT DOC, RNG: HCPCS | Performed by: SURGERY

## 2022-07-06 PROCEDURE — G8427 DOCREV CUR MEDS BY ELIG CLIN: HCPCS | Performed by: SURGERY

## 2022-07-06 PROCEDURE — G8536 NO DOC ELDER MAL SCRN: HCPCS | Performed by: SURGERY

## 2022-07-06 PROCEDURE — 1090F PRES/ABSN URINE INCON ASSESS: CPT | Performed by: SURGERY

## 2022-07-06 PROCEDURE — G8756 NO BP MEASURE DOC: HCPCS | Performed by: SURGERY

## 2022-07-06 PROCEDURE — 99213 OFFICE O/P EST LOW 20 MIN: CPT | Performed by: SURGERY

## 2022-07-06 NOTE — PATIENT INSTRUCTIONS

## 2022-07-06 NOTE — PROGRESS NOTES
HISTORY OF PRESENT ILLNESS  Yoon Ochoa is a 78 y.o. female. HPI ESTABLISHED patient here for 6 month follow up RIGHT breast cancer. Patient is doing well with no concerns today.      Right breast carcinoma: Dx: 10/2021  T1c N0 (Stage I) invasive lobular carcinoma, Tumor size 15 mm, LN -ve, grade 1, ER 99%, FL 0%, Her 2 2+, FISH -ve. DCIS present.      Treatment:      1. S/P right breast lumpectomy 12/15/2021  2. Completed adjuvant radiation 02/21/2022  3. Adjuvant Letrozole, start 03/07/2022    Family History:  Father- Colon cancer   ROS    Physical Exam  Vitals and nursing note reviewed. Chest:   Breasts: Breasts are symmetrical.      Right: No inverted nipple, mass, nipple discharge, skin change, tenderness, axillary adenopathy or supraclavicular adenopathy. Left: No inverted nipple, mass, nipple discharge, skin change, tenderness, axillary adenopathy or supraclavicular adenopathy. Lymphadenopathy:      Cervical: No cervical adenopathy. Upper Body:      Right upper body: No supraclavicular, axillary or pectoral adenopathy. Left upper body: No supraclavicular, axillary or pectoral adenopathy. ASSESSMENT and PLAN    ICD-10-CM ICD-9-CM    1. Malignant neoplasm of upper-outer quadrant of right breast in female, estrogen receptor positive ([facility] [address]  C50.411 174.4 NEGIN 3D AGUSTÍN W MAMMO BI DX INCL CAD    Z17.0 V86.0      - no evidence local recurrence  - f/u in 6 months with np after mammograms. 20 minutes was spent with patient on counseling and coordination of care.

## 2022-08-09 ENCOUNTER — OFFICE VISIT (OUTPATIENT)
Dept: FAMILY MEDICINE CLINIC | Age: 77
End: 2022-08-09
Payer: MEDICARE

## 2022-08-09 VITALS
WEIGHT: 125.4 LBS | RESPIRATION RATE: 18 BRPM | DIASTOLIC BLOOD PRESSURE: 65 MMHG | OXYGEN SATURATION: 98 % | HEART RATE: 57 BPM | BODY MASS INDEX: 21.41 KG/M2 | HEIGHT: 64 IN | SYSTOLIC BLOOD PRESSURE: 129 MMHG | TEMPERATURE: 98.5 F

## 2022-08-09 DIAGNOSIS — E03.9 ACQUIRED HYPOTHYROIDISM: ICD-10-CM

## 2022-08-09 DIAGNOSIS — R41.3 MEMORY CHANGE: ICD-10-CM

## 2022-08-09 DIAGNOSIS — I10 PRIMARY HYPERTENSION: Primary | ICD-10-CM

## 2022-08-09 DIAGNOSIS — C50.919 INVASIVE LOBULAR CARCINOMA OF BREAST IN FEMALE (HCC): ICD-10-CM

## 2022-08-09 PROCEDURE — 3017F COLORECTAL CA SCREEN DOC REV: CPT | Performed by: NURSE PRACTITIONER

## 2022-08-09 PROCEDURE — 1101F PT FALLS ASSESS-DOCD LE1/YR: CPT | Performed by: NURSE PRACTITIONER

## 2022-08-09 PROCEDURE — G8752 SYS BP LESS 140: HCPCS | Performed by: NURSE PRACTITIONER

## 2022-08-09 PROCEDURE — 1123F ACP DISCUSS/DSCN MKR DOCD: CPT | Performed by: NURSE PRACTITIONER

## 2022-08-09 PROCEDURE — G8427 DOCREV CUR MEDS BY ELIG CLIN: HCPCS | Performed by: NURSE PRACTITIONER

## 2022-08-09 PROCEDURE — 99214 OFFICE O/P EST MOD 30 MIN: CPT | Performed by: NURSE PRACTITIONER

## 2022-08-09 PROCEDURE — G8420 CALC BMI NORM PARAMETERS: HCPCS | Performed by: NURSE PRACTITIONER

## 2022-08-09 PROCEDURE — G0463 HOSPITAL OUTPT CLINIC VISIT: HCPCS | Performed by: NURSE PRACTITIONER

## 2022-08-09 PROCEDURE — G8536 NO DOC ELDER MAL SCRN: HCPCS | Performed by: NURSE PRACTITIONER

## 2022-08-09 PROCEDURE — G8432 DEP SCR NOT DOC, RNG: HCPCS | Performed by: NURSE PRACTITIONER

## 2022-08-09 PROCEDURE — 1090F PRES/ABSN URINE INCON ASSESS: CPT | Performed by: NURSE PRACTITIONER

## 2022-08-09 PROCEDURE — G8754 DIAS BP LESS 90: HCPCS | Performed by: NURSE PRACTITIONER

## 2022-08-09 NOTE — PROGRESS NOTES
Subjective:     Chief Complaint   Patient presents with    Follow-up     3 month follow up        HPI:  78 y.o.  presents for follow up appointment. HTN  Diet and Lifestyle: generally follows a low fat low cholesterol diet, generally follows a low sodium diet, nonsmoker  Home BP Monitoring: is not measured at home. Pertinent ROS: taking medications as instructed, no medication side effects noted, no TIA's, no chest pain on exertion, no dyspnea on exertion, + chronic swelling of ankles/ lower extremities but no pedal edema (Has had swelling in her legs for several years, \"been like that since I had my hysterectomy in 1996\") -trying to wear compression socks most days      Hypothyroid  Taking medicine daily. No palpitations, weight loss, new hair thinning. History of right breast cancer  S/P right breast lumpectomy 12/15/2021  *Completed adjuvant radiation 02/21/2022  * Adjuvant Letrozole, start 03/07/2022  Had follow up with breast surgery on 7/6/22, no evidence of recurrence. Follow up in 6 months for repeat mammogram  Has follow up with oncology in September      Has follow up with neurology later this month, no worsening of moods of memory. No hospital, ER or specialist visits since last primary care visit except as noted above.     Past Medical History:   Diagnosis Date    Anxiety     Arrhythmia     Arthritis     Breast cancer ([address]     left side     GERD (gastroesophageal reflux disease)     High cholesterol     HTN (hypertension)     Hypothyroid     Memory deficit     Shingles outbreak        Social History     Tobacco Use    Smoking status: Never    Smokeless tobacco: Never   Vaping Use    Vaping Use: Never used   Substance Use Topics    Alcohol use: No    Drug use: Never           Allergies   Allergen Reactions    Aricept [Donepezil] Diarrhea    Gabapentin Diarrhea     Pt had severe diarrhea for 2 days after starting      Pneumococcal 23-Roxanne Ps Vaccine Swelling       Health Maintenance reviewed - . ROS:  Gen: no fatigue, no fever, no chills, no unexplained weight loss or weight gain  Eyes: no excessive tearing, itching, or discharge  Nose: no rhinorrhea, no sinus pain  Mouth: no oral lesions, no sore throat, no difficulty swallowing  Resp: no shortness of breath, no wheezing, no cough  CV: no chest pain, no orthopnea, no paroxysmal nocturnal dyspnea, no lower extremity edema, no palpitations  Abd: no nausea, no heartburn, no diarrhea, no constipation, no abdominal pain  Neuro: no headaches, no syncope or presyncopal episodes  Endo: no polyuria, no polydipsia. : no hematuria, no dysuria, no frequency, no incontinence  Heme: no lymphadenopathy, no easy bruising or bleeding, no night sweats  MSK: no joint pain or swelling    PE:  Visit Vitals  /65   Pulse (!) 57   Temp 98.5 °F (36.9 °C) (Temporal)   Resp 18   Ht 5' 4\" (1.626 m)   Wt 125 lb 6.4 oz (56.9 kg)   LMP 01/06/1996 (Exact Date)   SpO2 98%   BMI 21.52 kg/m²     Gen: alert, oriented, no acute distress  Head: normocephalic, atraumatic  Eyes: pupils equal round reactive to light, sclera clear, conjunctiva clear  Oral: moist mucus membranes, no oral lesions, no pharyngeal inflammation or exudate  Neck: symmetric normal sized thyroid, no carotid bruits, no jugular vein distention  Resp: no increase work of breathing, lungs clear to ausculation bilaterally, no wheezing, rales or rhonchi  CV: S1, S2 normal.  No murmurs, rubs, or gallops. Abd: soft, not tender, not distended. No hepatosplenomegaly. Normal bowel sounds. No hernias. No abdominal or renal bruits. Neuro: cranial nerves intact, normal strength and movement in all extremities, and sensation intact and symmetric. Skin: no lesion or rash  Extremities: no cyanosis. Chronic lymphedema bilateral lower extremity    No results found for this visit on 08/09/22.     Assessment/Plan:  Differential diagnosis and treatment options reviewed with patient who is in agreement with treatment plan as outlined below. ICD-10-CM ICD-9-CM    1. Primary hypertension  W46 756.5 METABOLIC PANEL, COMPREHENSIVE      CBC WITH AUTOMATED DIFF      2. Acquired hypothyroidism  C78.7 060.0 METABOLIC PANEL, COMPREHENSIVE      CBC WITH AUTOMATED DIFF      TSH 3RD GENERATION      T4, FREE      3. [facility] ([facility]  C50.919 174.9       4. Memory change  R41.3 780.93           BP at goal. No change in therapy. Continue care with oncology/breast specialists. Continue care with neurology. Moods and memory stable. Routine labs to check thyroid. Discussed BMI and healthy weight. Encouraged patient to work to implement changes including diet high in raw fruits and vegetables, lean protein and good fats. Limit refined, processed carbohydrates and sugar. Encouraged regular exercise. Recommended regular cardiovascular exercise 3-6 times per week for 30-60 minutes daily. I have discussed the diagnosis with the patient and the intended plan as seen in the above orders. The patient has received an after-visit summary and questions were answered concerning future plans. I have discussed medication side effects and warnings with the patient as well. The patient verbalizes understanding and agreement with the plan.

## 2022-08-10 ENCOUNTER — TELEPHONE (OUTPATIENT)
Dept: FAMILY MEDICINE CLINIC | Age: 77
End: 2022-08-10

## 2022-08-10 DIAGNOSIS — E03.9 ACQUIRED HYPOTHYROIDISM: ICD-10-CM

## 2022-08-10 LAB
ALBUMIN SERPL-MCNC: 3.7 G/DL (ref 3.5–5)
ALBUMIN/GLOB SERPL: 1.3 {RATIO} (ref 1.1–2.2)
ALP SERPL-CCNC: 59 U/L (ref 45–117)
ALT SERPL-CCNC: 29 U/L (ref 12–78)
ANION GAP SERPL CALC-SCNC: 8 MMOL/L (ref 5–15)
AST SERPL-CCNC: 20 U/L (ref 15–37)
BASOPHILS # BLD: 0 K/UL (ref 0–0.1)
BASOPHILS NFR BLD: 1 % (ref 0–1)
BILIRUB SERPL-MCNC: 0.9 MG/DL (ref 0.2–1)
BUN SERPL-MCNC: 13 MG/DL (ref 6–20)
BUN/CREAT SERPL: 19 (ref 12–20)
CALCIUM SERPL-MCNC: 9.1 MG/DL (ref 8.5–10.1)
CHLORIDE SERPL-SCNC: 102 MMOL/L (ref 97–108)
CO2 SERPL-SCNC: 29 MMOL/L (ref 21–32)
CREAT SERPL-MCNC: 0.7 MG/DL (ref 0.55–1.02)
DIFFERENTIAL METHOD BLD: ABNORMAL
EOSINOPHIL # BLD: 0.1 K/UL (ref 0–0.4)
EOSINOPHIL NFR BLD: 2 % (ref 0–7)
ERYTHROCYTE [DISTWIDTH] IN BLOOD BY AUTOMATED COUNT: 13 % (ref 11.5–14.5)
GLOBULIN SER CALC-MCNC: 2.9 G/DL (ref 2–4)
GLUCOSE SERPL-MCNC: 102 MG/DL (ref 65–100)
HCT VFR BLD AUTO: 38.9 % (ref 35–47)
HGB BLD-MCNC: 12.9 G/DL (ref 11.5–16)
IMM GRANULOCYTES # BLD AUTO: 0 K/UL (ref 0–0.04)
IMM GRANULOCYTES NFR BLD AUTO: 0 % (ref 0–0.5)
LYMPHOCYTES # BLD: 0.7 K/UL (ref 0.8–3.5)
LYMPHOCYTES NFR BLD: 17 % (ref 12–49)
MCH RBC QN AUTO: 31.1 PG (ref 26–34)
MCHC RBC AUTO-ENTMCNC: 33.2 G/DL (ref 30–36.5)
MCV RBC AUTO: 93.7 FL (ref 80–99)
MONOCYTES # BLD: 0.4 K/UL (ref 0–1)
MONOCYTES NFR BLD: 10 % (ref 5–13)
NEUTS SEG # BLD: 2.9 K/UL (ref 1.8–8)
NEUTS SEG NFR BLD: 70 % (ref 32–75)
NRBC # BLD: 0 K/UL (ref 0–0.01)
NRBC BLD-RTO: 0 PER 100 WBC
PLATELET # BLD AUTO: 217 K/UL (ref 150–400)
PMV BLD AUTO: 10.6 FL (ref 8.9–12.9)
POTASSIUM SERPL-SCNC: 3.9 MMOL/L (ref 3.5–5.1)
PROT SERPL-MCNC: 6.6 G/DL (ref 6.4–8.2)
RBC # BLD AUTO: 4.15 M/UL (ref 3.8–5.2)
RBC MORPH BLD: ABNORMAL
SODIUM SERPL-SCNC: 139 MMOL/L (ref 136–145)
T4 FREE SERPL-MCNC: 1.5 NG/DL (ref 0.8–1.5)
TSH SERPL DL<=0.05 MIU/L-ACNC: 0.07 UIU/ML (ref 0.36–3.74)
WBC # BLD AUTO: 4.1 K/UL (ref 3.6–11)

## 2022-08-10 RX ORDER — LEVOTHYROXINE SODIUM 100 UG/1
100 TABLET ORAL
Qty: 90 TABLET | Refills: 1 | Status: SHIPPED | OUTPATIENT
Start: 2022-08-10

## 2022-08-10 NOTE — TELEPHONE ENCOUNTER
verified. Informed patient of message from the provider. Patient verified understanding and had no further questions.

## 2022-08-10 NOTE — TELEPHONE ENCOUNTER
Pt's 's is calling wife behalf;  pt's  states he is returning a call; Please call pt's  back     Thank You

## 2022-08-10 NOTE — PROGRESS NOTES
Labs all look pretty good except her TSH is still low. Will lower the dose of her thyroid medicine to 100 mcg daily, sent to her mail order today, should recheck levels in 2 months.

## 2022-08-25 ENCOUNTER — OFFICE VISIT (OUTPATIENT)
Dept: NEUROLOGY | Age: 77
End: 2022-08-25
Payer: MEDICARE

## 2022-08-25 VITALS
WEIGHT: 123.4 LBS | DIASTOLIC BLOOD PRESSURE: 80 MMHG | OXYGEN SATURATION: 99 % | BODY MASS INDEX: 23.3 KG/M2 | RESPIRATION RATE: 16 BRPM | TEMPERATURE: 98 F | SYSTOLIC BLOOD PRESSURE: 140 MMHG | HEART RATE: 64 BPM | HEIGHT: 61 IN

## 2022-08-25 DIAGNOSIS — R41.3 MEMORY LOSS: Primary | ICD-10-CM

## 2022-08-25 DIAGNOSIS — G31.84 MCI (MILD COGNITIVE IMPAIRMENT): ICD-10-CM

## 2022-08-25 DIAGNOSIS — F41.9 ANXIETY: ICD-10-CM

## 2022-08-25 PROBLEM — G30.9 ALZHEIMER'S DISEASE, UNSPECIFIED (CODE) (HCC): Status: ACTIVE | Noted: 2022-08-25

## 2022-08-25 PROCEDURE — 99213 OFFICE O/P EST LOW 20 MIN: CPT | Performed by: NURSE PRACTITIONER

## 2022-08-25 PROCEDURE — 1123F ACP DISCUSS/DSCN MKR DOCD: CPT | Performed by: NURSE PRACTITIONER

## 2022-08-25 RX ORDER — MEMANTINE HYDROCHLORIDE 10 MG/1
10 TABLET ORAL 2 TIMES DAILY
Qty: 180 TABLET | Refills: 2 | Status: SHIPPED | OUTPATIENT
Start: 2022-08-25

## 2022-08-25 NOTE — PROGRESS NOTES
[facility]  [address] [facility] [address]  [address] [facility] [place]  Tel: [phone]  Fax: [phone]      Date:  22     Name:  Madison Gill  :  1945  MRN:  [id number]     PCP:  Bear Chilel MD    Chief Complaint   Patient presents with    Memory Loss       HISTORY OF PRESENT ILLNESS:  Patient presents today for memory. Last seen with Dr Sofi Lacy May 2022. She is scheduled for neuropsych testing however this is not until 2023. Labs ordered: B 12 was normal, all other labs monitored by PCP. Head CT reviewed. Aricept was started 5 mg nightly:  but patient had some GI issues. Namenda 10mg nightly: pt's  helps set up the meds, has to remind her sometimes. Main thing she struggles with is remembering peoples names. Pt's  picks up a lot of the slack, she will load the washer but forget to start it. She used to put the silverware away it was all mismatches but now that is better. Drives well. No nightmares, sleeps well. No hallucinations. Attends Baptism, enjoys some housework. Loves her cat and shopping with her sister. Some anxiety, takes a pill for that. Sister is a counselor. Worsened when she can't remember things. Recap from last visit  with Dr Sofi Lacy:  Memory loss/mild cognitive impairment:  The differential for this does include metabolic, degenerative (alzheimer vs vascular)  I will obtain serology including a vitamin B12 looking for possible reversible causes of memory loss. Head CT from earlier this year was reviewed and there was no space-occupying lesion. I will obtain neurocognitive testing to help better determine and degree and impact of cognitive impairment. Given the cognitive decline that I saw on the visit today, I will start her on Aricept 5 mg daily. Side effects and toxicity of the medication were reviewed.   After above testing, will consider increasing dosing to 10 mg.  I discussed with the patient and family members in regards to the patient's cognitive limitations and need to ensure patient safety. Attempts to minimize opportunities of harm is important. Activities to be monitored, or avoided, would include cooking, ironing clothes, financial bill payments. Having structure to a day is important for the patient. Cognitive and physical activities should be considered daily. REVIEW OF SYSTEMS:     Review of Systems   Eyes:  Positive for pain (left eye at times). Negative for blurred vision and double vision. Musculoskeletal: Negative. Negative for falls. More stiff, takes glucosamine   Neurological:  Positive for tremors (right hand at times while eating it can shake a little). Negative for dizziness, tingling, sensory change, seizures and headaches. Psychiatric/Behavioral:  Positive for memory loss. Negative for depression and hallucinations. The patient is nervous/anxious. The patient does not have insomnia (up and down alot at night). All other systems reviewed and are negative. Current Outpatient Medications   Medication Sig    memantine (Namenda) 10 mg tablet Take 1 Tablet by mouth two (2) times a day. levothyroxine (SYNTHROID) 100 mcg tablet Take 1 Tablet by mouth Daily (before breakfast). hydroCHLOROthiazide (HYDRODIURIL) 12.5 mg tablet TAKE 1 TABLET DAILY    memantine (Namenda) 10 mg tablet Take 1 Tablet by mouth daily. escitalopram oxalate (LEXAPRO) 5 mg tablet Take 1 Tablet by mouth daily. amLODIPine (NORVASC) 2.5 mg tablet Take 1 Tablet by mouth daily. alendronate (FOSAMAX) 70 mg tablet TAKE 1 TABLET EVERY 7 DAYS    coenzyme q10 10 mg cap Take  by mouth. ascorbic acid, vitamin C, (VITAMIN C) 500 mg tablet Take  by mouth.    b complex-vitamin c-folic acid (NEPHROCAPS) 1 mg capsule Take 1 Capsule by mouth daily. aspirin 81 mg chewable tablet Take 81 mg by mouth daily.     pantoprazole (PROTONIX) 40 mg tablet TAKE 1 TABLET DAILY    fexofenadine HCl (ALLEGRA PO) Take by mouth. BID    letrozole (FEMARA) 2.5 mg tablet Take 1 Tablet by mouth daily. atorvastatin (LIPITOR) 40 mg tablet TAKE 1 TABLET NIGHTLY FOR CHOLESTEROL    atenoloL (TENORMIN) 25 mg tablet TAKE 2 TABLETS DAILY    lutein 20 mg tab Take 20 mg by mouth daily. cholecalciferol, vitamin d3, 400 unit cap Take  by mouth. No current facility-administered medications for this visit.      Allergies   Allergen Reactions    Aricept [Donepezil] Diarrhea    Gabapentin Diarrhea     Pt had severe diarrhea for 2 days after starting      Pneumococcal 23-Roxanne Ps Vaccine Swelling     Past Medical History:   Diagnosis Date    Anxiety     Arrhythmia     Arthritis     Breast cancer (HCC)     left side     GERD (gastroesophageal reflux disease)     High cholesterol     HTN (hypertension)     Hypothyroid     Memory deficit     Shingles outbreak      Past Surgical History:   Procedure Laterality Date    COLONOSCOPY N/A 6/7/2019    COLONOSCOPY performed by Marco Schaeffer MD at [facility] ENDOSCOPY    HX BREAST LUMPECTOMY Right 12/16/2021    RIGHT BREAST LUMPECTOMY WITH ULTRASOUND AND RIGHT BREAST SENTINEL NODE BIOPSY performed by Brady Zamora MD at [facility] AMBULATORY OR    HX GYN      hysterectomy    HX GYN      vaginal birth x 1     Social History     Socioeconomic History    Marital status:      Spouse name: Not on file    Number of children: Not on file    Years of education: Not on file    Highest education level: Not on file   Occupational History    Not on file   Tobacco Use    Smoking status: Never    Smokeless tobacco: Never   Vaping Use    Vaping Use: Never used   Substance and Sexual Activity    Alcohol use: No    Drug use: Never    Sexual activity: Yes     Partners: Male   Other Topics Concern     Service Not Asked    Blood Transfusions Not Asked    Caffeine Concern Not Asked    Occupational Exposure Not Asked    Hobby Hazards Not Asked    Sleep Concern Not Asked    Stress Concern Not Asked    Weight Concern Not Asked    Special Diet Not Asked    Back Care Not Asked    Exercise Not Asked    Bike Helmet Not Asked    Seat Belt Not Asked    Self-Exams Not Asked   Social History Narrative    . Retired. Social Determinants of Health     Financial Resource Strain: Not on file   Food Insecurity: Not on file   Transportation Needs: Not on file   Physical Activity: Not on file   Stress: Not on file   Social Connections: Not on file   Intimate Partner Violence: Not on file   Housing Stability: Not on file     Family History   Problem Relation Age of Onset    Hypertension Sister     Stroke Mother     Cancer Father      EXAMINATION:  CT NECK AND CHEST WITH CONTRAST     COMPARISON: INDICATION:     EXAMINATION:  CT ANGIOGRAPHY HEAD AND NECK and CT HEAD     COMPARISON: None      TECHNIQUE:  Routine noncontrast axial head CT was performed. Sagittal and  coronal reconstructions were generated. Following the uneventful administration  of iodinated contrast material, axial CT angiography of the head and neck was  performed. Delayed axial images through the head were also obtained. Coronal and  sagittal reconstructions were obtained. Manual postprocessing of images was  performed. 3-D  Sagittal maximal intensity projection images were obtained. 3-D  Coronal maximal intensity projections were obtained. CT dose reduction was  achieved through use of a standardized protocol tailored for this examination  and automatic exposure control for dose modulation. FINDINGS:     CT HEAD:     Ventricles: Midline, no hydrocephalus. Intracranial Hemorrhage: None. Brain Parenchyma/Brainstem: Normal for age. Basal Cisterns: Normal.  Paranasal Sinuses: Visualized sinuses are clear. Additional Comments: N/A.     CTA NECK:     Slightly suboptimal timing of contrast bolus with large amount of contrast in  the left subclavian vein, IVC and pulmonary artery. Aortic Arch: No significant abnormality.   Right Common Carotid Artery: No significant abnormality. Right Internal Carotid Artery: No significant abnormality. NASCET Right: 0-25%. Left Common Carotid Artery: No significant abnormality. Left Internal Carotid Artery: No significant abnormality. NASCET Left: 0-25%. .  Carotid stenosis determined using NASCET criteria. Right Vertebral Artery: No significant abnormality. Left Vertebral Artery: No significant abnormality. Cervical Soft Tissues: No significant abnormality. Lung Apices: No significant abnormality. Bones: No destructive bone lesion. Additional Comments: Slight motion artifact at the level of the common carotid  bifurcations. CTA HEAD:     Posterior Circulation: No flow limiting stenosis or occlusion. Anterior Circulation: No flow limiting stenosis or occlusion. Additional Comments: No evidence of aneurysm or vascular malformation. IMPRESSION     No acute process. No large vessel occlusion, arterial dissection, or  flow-limiting stenosis. Cerebral perfusion not performed. PHYSICAL EXAMINATION:    Visit Vitals  BP (!) 140/80 (BP 1 Location: Left upper arm, BP Patient Position: Sitting, BP Cuff Size: Adult)   Pulse 64   Temp 98 °F (36.7 °C) (Temporal)   Resp 16   Ht 5' 1\" (1.549 m)   Wt 123 lb 6.4 oz (56 kg)   SpO2 99%   BMI 23.32 kg/m²     General:  Well defined, nourished, and well groomed individual in no acute distress. Neck: Supple, nontender, normal range of motion. Musculoskeletal:  Extremities revealed no edema and had full range of motion of joints. Psych:  Good mood and bright affect    NEUROLOGICAL EXAMINATION:     Mental Status:   Alert and oriented to person, place, but unable to name the month or the year. Spelled world backwards correctly. Attention span and concentration are normal. Clear speech. Fund of knowledge preserved. Cranial Nerves:   PERRLA.   Visual fields were full  EOM: no evidence of nystagmus  Facial sensation:  normal and symmetric  Facial motor: normal and symmetric, no facial droop noted. Hearing intact  SCM strength intact  Tongue: midline without fasciculations    Motor Examination: Normal tone. 5/5 muscle strength in bilateral upper and lower extremities. No cogwheel rigidity. No muscle wasting, no twitching or fasciculation noted. Sensory exam:  Normal throughout to light touch in BUE and BLE    Coordination:   Finger to nose and rapid arm movement testing was normal.  No resting or intention tremor. Negative  pronator drift, Negative Romberg. Gait and Station:  Relatively steady gait. Reflexes:  DTRs 2+ in bilateral biceps, brachioradialis, patella. ASSESSMENT AND PLAN      ICD-10-CM ICD-9-CM    1. Memory loss  R41.3 780.93 memantine (Namenda) 10 mg tablet      2. MCI (mild cognitive impairment)  G31.84 331.83       3. Anxiety  F41.9 300.00         1. Memory loss: As patient is tolerating the Namenda 10mg nightly, we will go ahead and increase her dosage to Namenda 10mg BID, side effects discussed with patient/. Healthy lifestyle encouraged. Pt is scheduled for Neuropsych testing 3/2023, can modify plan of care based on results of testing. Pt/ to notify us if any changes or worsening of memory, cognition, safety concerns etc.   -     memantine (Namenda) 10 mg tablet; Take 1 Tablet by mouth two (2) times a day., Normal, Disp-180 Tablet, R-2  2. MCI (mild cognitive impairment): Increase made to the Namenda to 10mg BID. Encouraged healthy lifestyle, good sleep wake cycle, various activities encouraged to help maintain brain function  3. Anxiety: Continue Medications as rx'ed by PCP, pt notes family member is a counselor, continue to speak with her. Decrease stressors, encouraged pt and  to resume walking for exercise. Patient and/or family verbalized understand of all instructions and all questions/concerns were addressed. Safety/side effects of medications discussed.     Patient remains a complex patient secondary to polypharmacy, significant comorbid conditions, and use of multiple medications which complicate the decision making process related to patient's neurologic diagnosis. I will see the patient back in 7 months, after she has completed the Neuropsych testing.        Mich Maddox, EVEP-BC

## 2022-08-25 NOTE — PROGRESS NOTES
Chief Complaint   Patient presents with    Memory Loss     1. Have you been to the ER, urgent care clinic since your last visit? No Hospitalized since your last visit? No    2. Have you seen or consulted any other health care providers outside of the [address] [place] [address] since your last visit? No  Include any pap smears or colon screening.  No

## 2022-09-13 DIAGNOSIS — Z17.0 MALIGNANT NEOPLASM OF UPPER-OUTER QUADRANT OF RIGHT BREAST IN FEMALE, ESTROGEN RECEPTOR POSITIVE (HCC): ICD-10-CM

## 2022-09-13 DIAGNOSIS — C50.411 MALIGNANT NEOPLASM OF UPPER-OUTER QUADRANT OF RIGHT BREAST IN FEMALE, ESTROGEN RECEPTOR POSITIVE (HCC): ICD-10-CM

## 2022-09-14 ENCOUNTER — HOSPITAL ENCOUNTER (EMERGENCY)
Age: 77
Discharge: HOME | End: 2022-09-14
Attending: STUDENT IN AN ORGANIZED HEALTH CARE EDUCATION/TRAINING PROGRAM | Admitting: STUDENT IN AN ORGANIZED HEALTH CARE EDUCATION/TRAINING PROGRAM
Payer: MEDICARE

## 2022-09-14 VITALS
WEIGHT: 122 LBS | OXYGEN SATURATION: 97 % | DIASTOLIC BLOOD PRESSURE: 64 MMHG | SYSTOLIC BLOOD PRESSURE: 139 MMHG | TEMPERATURE: 97.9 F | HEART RATE: 58 BPM | BODY MASS INDEX: 23.03 KG/M2 | HEIGHT: 61 IN | RESPIRATION RATE: 11 BRPM

## 2022-09-14 DIAGNOSIS — R55 SYNCOPE AND COLLAPSE: Primary | ICD-10-CM

## 2022-09-14 DIAGNOSIS — E87.6 HYPOKALEMIA: ICD-10-CM

## 2022-09-14 LAB
ALBUMIN SERPL-MCNC: 3.6 G/DL (ref 3.5–5)
ALBUMIN/GLOB SERPL: 1.1 {RATIO} (ref 1.1–2.2)
ALP SERPL-CCNC: 57 U/L (ref 45–117)
ALT SERPL-CCNC: 25 U/L (ref 12–78)
ANION GAP SERPL CALC-SCNC: 7 MMOL/L (ref 5–15)
AST SERPL-CCNC: 23 U/L (ref 15–37)
ATRIAL RATE: 59 BPM
BASOPHILS # BLD: 0 K/UL (ref 0–0.1)
BASOPHILS NFR BLD: 1 % (ref 0–1)
BILIRUB SERPL-MCNC: 0.6 MG/DL (ref 0.2–1)
BUN SERPL-MCNC: 12 MG/DL (ref 6–20)
BUN/CREAT SERPL: 16 (ref 12–20)
CALCIUM SERPL-MCNC: 8.4 MG/DL (ref 8.5–10.1)
CALCULATED P AXIS, ECG09: 48 DEGREES
CALCULATED R AXIS, ECG10: 22 DEGREES
CALCULATED T AXIS, ECG11: 10 DEGREES
CHLORIDE SERPL-SCNC: 106 MMOL/L (ref 97–108)
CO2 SERPL-SCNC: 25 MMOL/L (ref 21–32)
CREAT SERPL-MCNC: 0.76 MG/DL (ref 0.55–1.02)
DIAGNOSIS, 93000: NORMAL
DIFFERENTIAL METHOD BLD: ABNORMAL
EOSINOPHIL # BLD: 0.1 K/UL (ref 0–0.4)
EOSINOPHIL NFR BLD: 3 % (ref 0–7)
ERYTHROCYTE [DISTWIDTH] IN BLOOD BY AUTOMATED COUNT: 12.6 % (ref 11.5–14.5)
GLOBULIN SER CALC-MCNC: 3.4 G/DL (ref 2–4)
GLUCOSE SERPL-MCNC: 104 MG/DL (ref 65–100)
HCT VFR BLD AUTO: 40.3 % (ref 35–47)
HGB BLD-MCNC: 13.7 G/DL (ref 11.5–16)
IMM GRANULOCYTES # BLD AUTO: 0 K/UL (ref 0–0.04)
IMM GRANULOCYTES NFR BLD AUTO: 0 % (ref 0–0.5)
LYMPHOCYTES # BLD: 0.7 K/UL (ref 0.8–3.5)
LYMPHOCYTES NFR BLD: 15 % (ref 12–49)
MCH RBC QN AUTO: 30.6 PG (ref 26–34)
MCHC RBC AUTO-ENTMCNC: 34 G/DL (ref 30–36.5)
MCV RBC AUTO: 90.2 FL (ref 80–99)
MONOCYTES # BLD: 0.5 K/UL (ref 0–1)
MONOCYTES NFR BLD: 10 % (ref 5–13)
NEUTS SEG # BLD: 3.5 K/UL (ref 1.8–8)
NEUTS SEG NFR BLD: 71 % (ref 32–75)
NRBC # BLD: 0 K/UL (ref 0–0.01)
NRBC BLD-RTO: 0 PER 100 WBC
P-R INTERVAL, ECG05: 150 MS
PLATELET # BLD AUTO: 213 K/UL (ref 150–400)
PMV BLD AUTO: 9.7 FL (ref 8.9–12.9)
POTASSIUM SERPL-SCNC: 3.2 MMOL/L (ref 3.5–5.1)
PROT SERPL-MCNC: 7 G/DL (ref 6.4–8.2)
Q-T INTERVAL, ECG07: 448 MS
QRS DURATION, ECG06: 94 MS
QTC CALCULATION (BEZET), ECG08: 443 MS
RBC # BLD AUTO: 4.47 M/UL (ref 3.8–5.2)
RBC MORPH BLD: ABNORMAL
SODIUM SERPL-SCNC: 138 MMOL/L (ref 136–145)
VENTRICULAR RATE, ECG03: 59 BPM
WBC # BLD AUTO: 4.8 K/UL (ref 3.6–11)

## 2022-09-14 PROCEDURE — 36415 COLL VENOUS BLD VENIPUNCTURE: CPT

## 2022-09-14 PROCEDURE — 93005 ELECTROCARDIOGRAM TRACING: CPT

## 2022-09-14 PROCEDURE — 99284 EMERGENCY DEPT VISIT MOD MDM: CPT

## 2022-09-14 PROCEDURE — 74011250637 HC RX REV CODE- 250/637: Performed by: STUDENT IN AN ORGANIZED HEALTH CARE EDUCATION/TRAINING PROGRAM

## 2022-09-14 PROCEDURE — 80053 COMPREHEN METABOLIC PANEL: CPT

## 2022-09-14 PROCEDURE — 85025 COMPLETE CBC W/AUTO DIFF WBC: CPT

## 2022-09-14 RX ORDER — LETROZOLE 2.5 MG/1
TABLET, FILM COATED ORAL
Qty: 90 TABLET | Refills: 3 | Status: SHIPPED | OUTPATIENT
Start: 2022-09-14

## 2022-09-14 RX ORDER — POTASSIUM CHLORIDE 750 MG/1
40 TABLET, FILM COATED, EXTENDED RELEASE ORAL
Status: COMPLETED | OUTPATIENT
Start: 2022-09-14 | End: 2022-09-14

## 2022-09-14 RX ADMIN — POTASSIUM CHLORIDE 40 MEQ: 750 TABLET, FILM COATED, EXTENDED RELEASE ORAL at 07:02

## 2022-09-14 NOTE — ED NOTES
Verbal shift change report given to Roya (oncoming nurse) by Mauricio Lcay (offgoing nurse). Report included the following information SBAR, Kardex, ED Summary, and MAR.

## 2022-09-14 NOTE — ED PROVIDER NOTES
EMERGENCY DEPARTMENT HISTORY AND PHYSICAL EXAM      Date: 9/14/2022  Patient Name: Allen Acosta    History of Presenting Illness     Chief Complaint   Patient presents with    Syncope     Pt arrives via EMS. Per EMS, Pt experienced syncopal episode at home after seeing 's blood from nose bleed. Per Pt, she did not experience syncope or a fall; she describes a period of 5-10 minutes when she \"zoned out\". Witnessed by  and neighbor. HPI: Allen Acosta, 78 y.o. female presents to the ED with cc of passing out. She woke up this morning, she said that she slept poorly, she has been under some stress recently also planning an 82th birthday party for her . She came down to the table, and he had a \"terrible\" nosebleed, and there was a lot of blood. She then remembers passing out. She did not fall or hit her head. She denies any injuries. She denies any chest pain or shortness of breath or palpitations. She was feeling in her normal state of health otherwise prior to this, no recent illnesses, no fevers or coughing, no abdominal pain vomiting or diarrhea. She denies any headache, no weakness numbness or tingling in her arms or legs. She takes 81 mg aspirin daily, no other blood thinners. There are no other complaints, changes, or physical findings at this time. PCP: Blanca Kelley MD    No current facility-administered medications on file prior to encounter. Current Outpatient Medications on File Prior to Encounter   Medication Sig Dispense Refill    memantine (Namenda) 10 mg tablet Take 1 Tablet by mouth two (2) times a day. 180 Tablet 2    levothyroxine (SYNTHROID) 100 mcg tablet Take 1 Tablet by mouth Daily (before breakfast). 90 Tablet 1    hydroCHLOROthiazide (HYDRODIURIL) 12.5 mg tablet TAKE 1 TABLET DAILY 90 Tablet 3    escitalopram oxalate (LEXAPRO) 5 mg tablet Take 1 Tablet by mouth daily.  90 Tablet 1    amLODIPine (NORVASC) 2.5 mg tablet Take 1 Tablet by mouth daily. 90 Tablet 1    alendronate (FOSAMAX) 70 mg tablet TAKE 1 TABLET EVERY 7 DAYS 12 Tablet 3    coenzyme q10 10 mg cap Take  by mouth. ascorbic acid, vitamin C, (VITAMIN C) 500 mg tablet Take  by mouth.      b complex-vitamin c-folic acid (NEPHROCAPS) 1 mg capsule Take 1 Capsule by mouth daily. aspirin 81 mg chewable tablet Take 81 mg by mouth daily. pantoprazole (PROTONIX) 40 mg tablet TAKE 1 TABLET DAILY 90 Tablet 3    fexofenadine HCl (ALLEGRA PO) Take  by mouth. BID      letrozole (FEMARA) 2.5 mg tablet Take 1 Tablet by mouth daily. 90 Tablet 2    atorvastatin (LIPITOR) 40 mg tablet TAKE 1 TABLET NIGHTLY FOR CHOLESTEROL 90 Tablet 3    atenoloL (TENORMIN) 25 mg tablet TAKE 2 TABLETS DAILY 180 Tablet 3    lutein 20 mg tab Take 20 mg by mouth daily. cholecalciferol, vitamin d3, 400 unit cap Take  by mouth. Past History     Past Medical History:  Past Medical History:   Diagnosis Date    Anxiety     Arrhythmia     Arthritis     Breast cancer ([facility] [address]     left side     GERD (gastroesophageal reflux disease)     High cholesterol     HTN (hypertension)     Hypothyroid     Memory deficit     Shingles outbreak        Past Surgical History:  Past Surgical History:   Procedure Laterality Date    COLONOSCOPY N/A 6/7/2019    COLONOSCOPY performed by Van Davison MD at [facility] ENDOSCOPY    HX BREAST LUMPECTOMY Right 12/16/2021    RIGHT BREAST LUMPECTOMY WITH ULTRASOUND AND RIGHT BREAST SENTINEL NODE BIOPSY performed by Reese Mohamud MD at [facility] AMBULATORY OR    HX GYN      hysterectomy    HX GYN      vaginal birth x 1       Family History:  Family History   Problem Relation Age of Onset    Hypertension Sister     Stroke Mother     Cancer Father        Social History:  Social History     Tobacco Use    Smoking status: Never    Smokeless tobacco: Never   Vaping Use    Vaping Use: Never used   Substance Use Topics    Alcohol use: No    Drug use: Never       Allergies:   Allergies   Allergen Reactions    Aricept [Donepezil] Diarrhea    Gabapentin Diarrhea     Pt had severe diarrhea for 2 days after starting      Pneumococcal 23-Roxanne Ps Vaccine Swelling         Review of Systems   no fever  No ear pain  No eye pain  no shortness of breath  no chest pain  no abdominal pain  no dysuria  no leg pain  No rash  No lymphadenopathy  No weight loss    Physical Exam   Physical Exam  Constitutional:       General: She is not in acute distress. Appearance: She is not toxic-appearing. HENT:      Head: Normocephalic. Eyes:      Extraocular Movements: Extraocular movements intact. Cardiovascular:      Rate and Rhythm: Normal rate and regular rhythm. Pulmonary:      Effort: Pulmonary effort is normal.      Breath sounds: Normal breath sounds. Abdominal:      Palpations: Abdomen is soft. Tenderness: There is no abdominal tenderness. Musculoskeletal:         General: No tenderness or deformity. Cervical back: Neck supple. Skin:     General: Skin is warm and dry. Neurological:      General: No focal deficit present. Mental Status: She is alert and oriented to person, place, and time. Cranial Nerves: No cranial nerve deficit. Comments: 5/5 strength with bicep flexion and extension bilaterally, 5/5 strength with ankle flexion and extension bilaterally. Sensation to light touch intact over upper and lower extremities bilaterally. Normal straight arm and straight leg raise bilaterally.    Psychiatric:         Mood and Affect: Mood normal.       Diagnostic Study Results     Labs -     Recent Results (from the past 24 hour(s))   CBC WITH AUTOMATED DIFF    Collection Time: 09/14/22  6:09 AM   Result Value Ref Range    WBC 4.8 3.6 - 11.0 K/uL    RBC 4.47 3.80 - 5.20 M/uL    HGB 13.7 11.5 - 16.0 g/dL    HCT 40.3 35.0 - 47.0 %    MCV 90.2 80.0 - 99.0 FL    MCH 30.6 26.0 - 34.0 PG    MCHC 34.0 30.0 - 36.5 g/dL    RDW 12.6 11.5 - 14.5 %    PLATELET 104 381 - 472 K/uL    MPV 9.7 8.9 - 12.9 FL    NRBC 0.0 0  WBC    ABSOLUTE NRBC 0.00 0.00 - 0.01 K/uL    NEUTROPHILS 71 32 - 75 %    LYMPHOCYTES 15 12 - 49 %    MONOCYTES 10 5 - 13 %    EOSINOPHILS 3 0 - 7 %    BASOPHILS 1 0 - 1 %    IMMATURE GRANULOCYTES 0 0.0 - 0.5 %    ABS. NEUTROPHILS 3.5 1.8 - 8.0 K/UL    ABS. LYMPHOCYTES 0.7 (L) 0.8 - 3.5 K/UL    ABS. MONOCYTES 0.5 0.0 - 1.0 K/UL    ABS. EOSINOPHILS 0.1 0.0 - 0.4 K/UL    ABS. BASOPHILS 0.0 0.0 - 0.1 K/UL    ABS. IMM. GRANS. 0.0 0.00 - 0.04 K/UL    DF AUTOMATED      RBC COMMENTS NORMOCYTIC, NORMOCHROMIC     METABOLIC PANEL, COMPREHENSIVE    Collection Time: 09/14/22  6:09 AM   Result Value Ref Range    Sodium 138 136 - 145 mmol/L    Potassium 3.2 (L) 3.5 - 5.1 mmol/L    Chloride 106 97 - 108 mmol/L    CO2 25 21 - 32 mmol/L    Anion gap 7 5 - 15 mmol/L    Glucose 104 (H) 65 - 100 mg/dL    BUN 12 6 - 20 MG/DL    Creatinine 0.76 0.55 - 1.02 MG/DL    BUN/Creatinine ratio 16 12 - 20      GFR est AA >60 >60 ml/min/1.73m2    GFR est non-AA >60 >60 ml/min/1.73m2    Calcium 8.4 (L) 8.5 - 10.1 MG/DL    Bilirubin, total 0.6 0.2 - 1.0 MG/DL    ALT (SGPT) 25 12 - 78 U/L    AST (SGOT) 23 15 - 37 U/L    Alk. phosphatase 57 45 - 117 U/L    Protein, total 7.0 6.4 - 8.2 g/dL    Albumin 3.6 3.5 - 5.0 g/dL    Globulin 3.4 2.0 - 4.0 g/dL    A-G Ratio 1.1 1.1 - 2.2         Radiologic Studies -   No orders to display     CT Results  (Last 48 hours)      None          CXR Results  (Last 48 hours)      None              Medical Decision Making   I am the first provider for this patient. I reviewed the vital signs, available nursing notes, past medical history, past surgical history, family history and social history. Vital Signs-Reviewed the patient's vital signs.   Patient Vitals for the past 24 hrs:   Temp Pulse Resp BP SpO2   09/14/22 0645 -- (!) 58 11 139/64 97 %   09/14/22 0631 -- (!) 55 13 (!) 144/72 95 %   09/14/22 0616 -- (!) 58 17 (!) 157/83 98 %   09/14/22 0601 -- 61 20 (!) 149/69 98 %   09/14/22 0547 97.9 °F (36.6 °C) (!) 57 15 (!) 166/70 96 %         Provider Notes (Medical Decision Making):   62-year-old female presenting with syncope. This was in the setting of her  having nosebleed. Differential includes situational syncope, vasovagal syncope, orthostatic hypotension, electrolyte or metabolic abnormalities, arrhythmia less likely. She otherwise denies chest pain or shortness of breath, she is not tachycardic, saturating well on room air, unlikely ACS, PE or any other acute cardiopulmonary emergency. She did not hit her head, her neurologic exam is unremarkable, she denies any complaints currently, unlikely acute intracranial emergency. ED Course:     Initial assessment performed. The patients presenting problems have been discussed, and they are in agreement with the care plan formulated and outlined with them. I have encouraged them to ask questions as they arise throughout their visit. EKG is performed at 5: 37, shows sinus bradycardia rate of 59, , QRS 94, QTc 443, axis upright, no ST segment elevation or depression concerning for ACS, this is interpreted as sinus bradycardia. CBC negative for leukocytosis or anemia, basic metabolic panel with normal renal function, hypokalemia with potassium 3.2 which will be replaced orally. On reevaluation, patient is resting comfortably, able to ambulate without recurrence of symptoms, she continues to deny complaints. Patient is counseled on supportive care and return precautions. Will return to the ED for any worsening lightheadedness, syncope, pain, or any new or worrisome symptoms. Will followup with primary care doctor within 2 days. Critical Care Time:         Disposition:  Home    PLAN:  1. Current Discharge Medication List        2.    Follow-up Information       Follow up With Specialties Details Why Brooke Srivastava MD Family Medicine Schedule an appointment as soon as possible for a visit in 2 days  [phone] [address]  [address] [place] [address]  [phone]      [facility] EMERGENCY DEPT Emergency Medicine  As needed, If symptoms worsen [address] [facility]  [address] [facility]  [phone]          Return to ED if worse     Diagnosis     Clinical Impression: Acute syncope, acute hypokalemia

## 2022-09-16 DIAGNOSIS — Z86.59 HISTORY OF OCD (OBSESSIVE COMPULSIVE DISORDER): ICD-10-CM

## 2022-09-16 DIAGNOSIS — F41.9 ANXIETY: ICD-10-CM

## 2022-09-16 DIAGNOSIS — R41.3 MEMORY CHANGE: ICD-10-CM

## 2022-09-16 DIAGNOSIS — R40.4 EPISODES OF STARING: ICD-10-CM

## 2022-09-16 DIAGNOSIS — I10 PRIMARY HYPERTENSION: ICD-10-CM

## 2022-09-16 DIAGNOSIS — R45.86 MOOD SWINGS: ICD-10-CM

## 2022-09-16 RX ORDER — AMLODIPINE BESYLATE 2.5 MG/1
2.5 TABLET ORAL DAILY
Qty: 90 TABLET | Refills: 1 | Status: SHIPPED | OUTPATIENT
Start: 2022-09-16

## 2022-09-16 RX ORDER — ESCITALOPRAM OXALATE 5 MG/1
5 TABLET ORAL DAILY
Qty: 90 TABLET | Refills: 1 | Status: SHIPPED | OUTPATIENT
Start: 2022-09-16

## 2022-09-16 NOTE — TELEPHONE ENCOUNTER
Refill Request (patient's  calling)   -Patient's  is requesting 80 day supply   . Requested Prescriptions     Pending Prescriptions Disp Refills    amLODIPine (NORVASC) 2.5 mg tablet 90 Tablet 1     Sig: Take 1 Tablet by mouth daily. escitalopram oxalate (LEXAPRO) 5 mg tablet 90 Tablet 1     Sig: Take 1 Tablet by mouth daily.        Thank You

## 2022-09-20 NOTE — PROGRESS NOTES
Leonidas Perkins is a 78 y.o. female here for 3 month follow up for right breast cancer. She is taking Letrozole. Pt states she has been doing well. No concerns brought up.     ,1. Have you been to the ER, urgent care clinic since your last visit? Hospitalized since your last visit? 9/14/22 syncope  - low potasium and dehydrated. 2. Have you seen or consulted any other health care providers outside of the [address] [place] [address] since your last visit? Include any pap smears or colon screening.  no

## 2022-09-21 ENCOUNTER — OFFICE VISIT (OUTPATIENT)
Dept: ONCOLOGY | Age: 77
End: 2022-09-21
Payer: MEDICARE

## 2022-09-21 VITALS
DIASTOLIC BLOOD PRESSURE: 69 MMHG | BODY MASS INDEX: 23.41 KG/M2 | OXYGEN SATURATION: 96 % | HEIGHT: 61 IN | WEIGHT: 124 LBS | TEMPERATURE: 97.7 F | HEART RATE: 57 BPM | SYSTOLIC BLOOD PRESSURE: 123 MMHG

## 2022-09-21 DIAGNOSIS — C50.411 MALIGNANT NEOPLASM OF UPPER-OUTER QUADRANT OF RIGHT BREAST IN FEMALE, ESTROGEN RECEPTOR POSITIVE (HCC): Primary | ICD-10-CM

## 2022-09-21 DIAGNOSIS — Z17.0 MALIGNANT NEOPLASM OF UPPER-OUTER QUADRANT OF RIGHT BREAST IN FEMALE, ESTROGEN RECEPTOR POSITIVE (HCC): Primary | ICD-10-CM

## 2022-09-21 PROCEDURE — G8754 DIAS BP LESS 90: HCPCS | Performed by: INTERNAL MEDICINE

## 2022-09-21 PROCEDURE — 3017F COLORECTAL CA SCREEN DOC REV: CPT | Performed by: INTERNAL MEDICINE

## 2022-09-21 PROCEDURE — 99213 OFFICE O/P EST LOW 20 MIN: CPT | Performed by: INTERNAL MEDICINE

## 2022-09-21 PROCEDURE — G0463 HOSPITAL OUTPT CLINIC VISIT: HCPCS | Performed by: INTERNAL MEDICINE

## 2022-09-21 PROCEDURE — G8420 CALC BMI NORM PARAMETERS: HCPCS | Performed by: INTERNAL MEDICINE

## 2022-09-21 PROCEDURE — G8427 DOCREV CUR MEDS BY ELIG CLIN: HCPCS | Performed by: INTERNAL MEDICINE

## 2022-09-21 PROCEDURE — 1123F ACP DISCUSS/DSCN MKR DOCD: CPT | Performed by: INTERNAL MEDICINE

## 2022-09-21 PROCEDURE — G8536 NO DOC ELDER MAL SCRN: HCPCS | Performed by: INTERNAL MEDICINE

## 2022-09-21 PROCEDURE — 1101F PT FALLS ASSESS-DOCD LE1/YR: CPT | Performed by: INTERNAL MEDICINE

## 2022-09-21 PROCEDURE — 1090F PRES/ABSN URINE INCON ASSESS: CPT | Performed by: INTERNAL MEDICINE

## 2022-09-21 PROCEDURE — G8432 DEP SCR NOT DOC, RNG: HCPCS | Performed by: INTERNAL MEDICINE

## 2022-09-21 PROCEDURE — G8752 SYS BP LESS 140: HCPCS | Performed by: INTERNAL MEDICINE

## 2022-09-21 NOTE — LETTER
9/21/2022    Patient: Catrachita Mauricio   YOB: 1945   Date of Visit: 9/21/2022     Taya Sorensen MD  [address] [place] [address]  [address] [facility] [address]    Dear Taya Sorensen MD,      Thank you for referring Ms. Ernesto Carbajal to [address] [place] [address] for evaluation. My notes for this consultation are attached. If you have questions, please do not hesitate to call me. I look forward to following your patient along with you.       Sincerely,    Emilia Michelle MD

## 2022-09-21 NOTE — PROGRESS NOTES
[address] [facility] [address], [address] [facility], 45 [facility], [address] [place] [address]  [phone]    Oncology Note        Patient: Nelda Reynoso MRN: [id number]  SSN: [id number]    YOB: 1945  Age: 78 y.o. Sex: female        Diagnosis:     1. Right breast carcinoma: Dx: 10/2021  T1c N0 (Stage I) invasive lobular carcinoma, Tumor size 15 mm, LN -ve, grade 1, ER 99%, VA 0%, Her 2 2+, FISH -ve. DCIS present. Treatment:     1. S/P right breast lumpectomy 12/15/2021  2. Completed adjuvant radiation 02/21/2022  3. Adjuvant Letrozole, start 03/07/2022    Subjective:      Nelda Reynoso is a 78 y.o. female who I am seeing for a new diagnosis of right sided invasive lobular carcinoma. She underwent a routine screening mammogram. An abnormal mammogram led to a biopsy which revealed invasive lobular carcinoma. She then underwent a right breast lumpectomy 12/16/2021. She completed adjuvant radiation in Feb 2022. Review of Systems:    Constitutional: negative  Eyes: negative  Ears, Nose, Mouth, Throat, and Face: negative  Respiratory: negative  Cardiovascular: negative  Gastrointestinal: negative  Genitourinary:negative  Integument/Breast: negative  Hematologic/Lymphatic: negative  Musculoskeletal:negative  Neurological: negative    Review of systems was reviewed and updated as needed on 09/21/22.       Past Medical History:   Diagnosis Date    Anxiety     Arrhythmia     Arthritis     Breast cancer ([address]     left side     GERD (gastroesophageal reflux disease)     High cholesterol     HTN (hypertension)     Hypothyroid     Memory deficit     Shingles outbreak      Past Surgical History:   Procedure Laterality Date    COLONOSCOPY N/A 6/7/2019    COLONOSCOPY performed by Randy Buck MD at \[facility]\"" ENDOSCOPY    HX BREAST LUMPECTOMY Right 12/16/2021    RIGHT BREAST LUMPECTOMY WITH ULTRASOUND AND RIGHT BREAST SENTINEL NODE BIOPSY performed by Delfino Douglas MD at [facility] AMBULATORY OR    HX GYN      hysterectomy    HX GYN      vaginal birth x 1      Family History   Problem Relation Age of Onset    Hypertension Sister     Stroke Mother     Cancer Father      Social History     Tobacco Use    Smoking status: Never    Smokeless tobacco: Never   Substance Use Topics    Alcohol use: No      Prior to Admission medications    Medication Sig Start Date End Date Taking? Authorizing Provider   amLODIPine (NORVASC) 2.5 mg tablet Take 1 Tablet by mouth daily. 9/16/22  Yes Brittanie Floyd NP   escitalopram oxalate (LEXAPRO) 5 mg tablet Take 1 Tablet by mouth daily. 9/16/22  Yes Brittanie Floyd NP   letrozole (FEMARA) 2.5 mg tablet TAKE 1 TABLET DAILY 9/14/22  Yes Emily Nogueira NP   memantine (Namenda) 10 mg tablet Take 1 Tablet by mouth two (2) times a day. 8/25/22  Yes Diamond Hanley NP   levothyroxine (SYNTHROID) 100 mcg tablet Take 1 Tablet by mouth Daily (before breakfast). 8/10/22  Yes Brittanie Floyd NP   hydroCHLOROthiazide (HYDRODIURIL) 12.5 mg tablet TAKE 1 TABLET DAILY 6/24/22  Yes Brittanie lFoyd NP   alendronate (FOSAMAX) 70 mg tablet TAKE 1 TABLET EVERY 7 DAYS 2/11/22  Yes Jaime Astudillo MD   coenzyme q10 10 mg cap Take  by mouth. Yes Provider, Historical   ascorbic acid, vitamin C, (VITAMIN C) 500 mg tablet Take  by mouth. Yes Provider, Historical   b complex-vitamin c-folic acid (NEPHROCAPS) 1 mg capsule Take 1 Capsule by mouth daily. Yes Provider, Historical   aspirin 81 mg chewable tablet Take 81 mg by mouth daily. Yes Provider, Historical   pantoprazole (PROTONIX) 40 mg tablet TAKE 1 TABLET DAILY 1/25/22  Yes Jaime Astudillo MD   fexofenadine HCl (ALLEGRA PO) Take  by mouth.  BID   Yes Provider, Historical   atorvastatin (LIPITOR) 40 mg tablet TAKE 1 TABLET NIGHTLY FOR CHOLESTEROL 10/27/21  Yes Jaime Astudillo MD   atenoloL (TENORMIN) 25 mg tablet TAKE 2 TABLETS DAILY 10/15/21  Yes Jaime Astudillo MD   lutein 20 mg tab Take 20 mg by mouth daily. Yes Provider, Historical   cholecalciferol, vitamin d3, 400 unit cap Take  by mouth. Yes Provider, Historical              Allergies   Allergen Reactions    Aricept [Donepezil] Diarrhea    Gabapentin Diarrhea     Pt had severe diarrhea for 2 days after starting      Pneumococcal 23-Roxanne Ps Vaccine Swelling           Objective:     Vitals:    09/21/22 1310   BP: 123/69   Pulse: (!) 57   Temp: 97.7 °F (36.5 °C)   TempSrc: Temporal   SpO2: 96%   Weight: 124 lb (56.2 kg)   Height: 5' 1\" (1.549 m)      Pain Scale: 0 - No pain/10    Physical Exam:    GENERAL: alert, cooperative, no distress, appears stated age  EYE: conjunctivae/corneas clear. LYMPHATIC: Cervical, supraclavicular, and axillary nodes normal.   THROAT & NECK: normal and no erythema or exudates noted. LUNG: clear to auscultation bilaterally  HEART: regular rate and rhythm  ABDOMEN: soft, non-tender. Bowel sounds normal. No masses,  no organomegaly  EXTREMITIES:  extremities normal, atraumatic, no cyanosis or edema  SKIN: Normal.  NEUROLOGIC: AOx3. Gait normal.    The above physical exam was reviewed and updated as needed on 09/21/22. Assessment:     1. Right breast carcinoma: Dx: 10/2021  T1c N0 (Stage I) invasive lobular carcinoma, Tumor size 15 mm, LN -ve, grade 1, ER 99%, HI 0%, Her 2 2+, FISH -ve. DCIS present. ECOG PS 0  Intent of Treatment - curative  Prognosis - excellent    S/P right breast lumpectomy 12/15/2021  Adjuvant radiation to the right breast on 03/21/2022  Letrozole. Tolerating treatment  In remission      2. Osteoporosis    On Fosamax. Plan:     1. Continue Letrozole. 2. Return in 6 months  3. Mammogram scheduled for 10/6/2022      Signed By: Greer Man NP     September 21, 2022         I personally saw and evaluated the patient and performed the key components of medical decision making with Constantine Eckert NP. I reviewed and verified the above documentation and modified it as needed.     MsBailey Elisa Vidal is a women with ER +ve IDC. She is taking Letrozole and will continue today. I obtained history, performed physical exam, reviewed labs and imaging and communicated the treatment plan to the patient. Signed by: Deep Heredia MD                     September 21, 2022        CC. Virginia Palmer MD  CC.  Tanvi Mireles MD

## 2022-09-23 ENCOUNTER — OFFICE VISIT (OUTPATIENT)
Dept: FAMILY MEDICINE CLINIC | Age: 77
End: 2022-09-23
Payer: MEDICARE

## 2022-09-23 VITALS
HEART RATE: 64 BPM | HEIGHT: 61 IN | SYSTOLIC BLOOD PRESSURE: 145 MMHG | WEIGHT: 123.2 LBS | TEMPERATURE: 97.9 F | BODY MASS INDEX: 23.26 KG/M2 | OXYGEN SATURATION: 97 % | RESPIRATION RATE: 16 BRPM | DIASTOLIC BLOOD PRESSURE: 77 MMHG

## 2022-09-23 DIAGNOSIS — G30.9 ALZHEIMER'S DISEASE, UNSPECIFIED (CODE) (HCC): ICD-10-CM

## 2022-09-23 DIAGNOSIS — R41.3 MEMORY CHANGE: ICD-10-CM

## 2022-09-23 DIAGNOSIS — E03.9 ACQUIRED HYPOTHYROIDISM: ICD-10-CM

## 2022-09-23 DIAGNOSIS — I10 PRIMARY HYPERTENSION: ICD-10-CM

## 2022-09-23 DIAGNOSIS — M81.0 AGE-RELATED OSTEOPOROSIS WITHOUT CURRENT PATHOLOGICAL FRACTURE: ICD-10-CM

## 2022-09-23 DIAGNOSIS — Z00.00 ROUTINE GENERAL MEDICAL EXAMINATION AT A HEALTH CARE FACILITY: Primary | ICD-10-CM

## 2022-09-23 DIAGNOSIS — Z23 ENCOUNTER FOR IMMUNIZATION: ICD-10-CM

## 2022-09-23 LAB
T4 FREE SERPL-MCNC: 1.2 NG/DL (ref 0.8–1.5)
TSH SERPL DL<=0.05 MIU/L-ACNC: 0.57 UIU/ML (ref 0.36–3.74)

## 2022-09-23 PROCEDURE — 90694 VACC AIIV4 NO PRSRV 0.5ML IM: CPT | Performed by: FAMILY MEDICINE

## 2022-09-23 PROCEDURE — G0463 HOSPITAL OUTPT CLINIC VISIT: HCPCS | Performed by: FAMILY MEDICINE

## 2022-09-23 PROCEDURE — G8427 DOCREV CUR MEDS BY ELIG CLIN: HCPCS | Performed by: FAMILY MEDICINE

## 2022-09-23 PROCEDURE — 1101F PT FALLS ASSESS-DOCD LE1/YR: CPT | Performed by: FAMILY MEDICINE

## 2022-09-23 PROCEDURE — 99214 OFFICE O/P EST MOD 30 MIN: CPT | Performed by: FAMILY MEDICINE

## 2022-09-23 PROCEDURE — G8754 DIAS BP LESS 90: HCPCS | Performed by: FAMILY MEDICINE

## 2022-09-23 PROCEDURE — G8536 NO DOC ELDER MAL SCRN: HCPCS | Performed by: FAMILY MEDICINE

## 2022-09-23 PROCEDURE — G8753 SYS BP > OR = 140: HCPCS | Performed by: FAMILY MEDICINE

## 2022-09-23 PROCEDURE — 1090F PRES/ABSN URINE INCON ASSESS: CPT | Performed by: FAMILY MEDICINE

## 2022-09-23 PROCEDURE — 3017F COLORECTAL CA SCREEN DOC REV: CPT | Performed by: FAMILY MEDICINE

## 2022-09-23 PROCEDURE — 1123F ACP DISCUSS/DSCN MKR DOCD: CPT | Performed by: FAMILY MEDICINE

## 2022-09-23 PROCEDURE — G8420 CALC BMI NORM PARAMETERS: HCPCS | Performed by: FAMILY MEDICINE

## 2022-09-23 PROCEDURE — G8510 SCR DEP NEG, NO PLAN REQD: HCPCS | Performed by: FAMILY MEDICINE

## 2022-09-23 PROCEDURE — G0439 PPPS, SUBSEQ VISIT: HCPCS | Performed by: FAMILY MEDICINE

## 2022-09-23 NOTE — PROGRESS NOTES
1. \"Have you been to the ER, urgent care clinic since your last visit? Hospitalized since your last visit? \" Yes Where: [address], on file for syncope    2. \"Have you seen or consulted any other health care providers outside of the [address] [place] [address] since your last visit? \" No     3. For patients aged 39-70: Has the patient had a colonoscopy / FIT/ Cologuard? Yes - Care Gap present. Most recent result on file      If the patient is female:    4. For patients aged 41-77: Has the patient had a mammogram within the past 2 years? NA - based on age or sex      11. For patients aged 21-65: Has the patient had a pap smear?  NA - based on age or sex    Health Maintenance Due   Topic Date Due    DTaP/Tdap/Td series (1 - Tdap) Never done    COVID-19 Vaccine (4 - Booster for [employer] series) 02/06/2022    Flu Vaccine (1) 08/01/2022    Medicare Yearly Exam  09/22/2022     Chief Complaint   Patient presents with    Annual Wellness Visit Pt has appt 6/29/22 first opening to see Susana Taylor PA-C  Requesting to be filled until appt. Pt said she is doing wonderful on the medication.  [facility]

## 2022-09-23 NOTE — PROGRESS NOTES
Medicare Annual Wellness Visit    I have reviewed the patient's medical history in detail and updated the computerized patient record. History   Ervin Dalton is a 78 y.o. female who presents to follow-up on chronic medical issues. She is seeing oncology for breast cancer and is getting a good report. Her only concern today is her memory which she does not really see but her  and sister are telling her that she is repeating herself and asking a lot of same questions over again. She is driving and family is not concerned about her driving. She is tolerating Namenda. Saw neurology 8/25. Has neuropsych testing scheduled but that is a ways off. Diagnosis listed in note is MCI. She is here by herself today. Participatory with the interview. Past Medical History:   Diagnosis Date    Anxiety     Arrhythmia     Arthritis     Breast cancer ([address]     left side     GERD (gastroesophageal reflux disease)     High cholesterol     HTN (hypertension)     Hypothyroid     Memory deficit     Shingles outbreak       Past Surgical History:   Procedure Laterality Date    COLONOSCOPY N/A 6/7/2019    COLONOSCOPY performed by Baldemar Funez MD at [facility] ENDOSCOPY    HX BREAST LUMPECTOMY Right 12/16/2021    RIGHT BREAST LUMPECTOMY WITH ULTRASOUND AND RIGHT BREAST SENTINEL NODE BIOPSY performed by Quique Berry MD at [facility] AMBULATORY OR    HX GYN      hysterectomy    HX GYN      vaginal birth x 1     Current Outpatient Medications   Medication Sig Dispense Refill    amLODIPine (NORVASC) 2.5 mg tablet Take 1 Tablet by mouth daily. 90 Tablet 1    escitalopram oxalate (LEXAPRO) 5 mg tablet Take 1 Tablet by mouth daily. 90 Tablet 1    letrozole (FEMARA) 2.5 mg tablet TAKE 1 TABLET DAILY 90 Tablet 3    memantine (Namenda) 10 mg tablet Take 1 Tablet by mouth two (2) times a day. 180 Tablet 2    levothyroxine (SYNTHROID) 100 mcg tablet Take 1 Tablet by mouth Daily (before breakfast).  90 Tablet 1 hydroCHLOROthiazide (HYDRODIURIL) 12.5 mg tablet TAKE 1 TABLET DAILY 90 Tablet 3    alendronate (FOSAMAX) 70 mg tablet TAKE 1 TABLET EVERY 7 DAYS 12 Tablet 3    coenzyme q10 10 mg cap Take  by mouth. ascorbic acid, vitamin C, (VITAMIN C) 500 mg tablet Take  by mouth.      b complex-vitamin c-folic acid (NEPHROCAPS) 1 mg capsule Take 1 Capsule by mouth daily. aspirin 81 mg chewable tablet Take 81 mg by mouth daily. pantoprazole (PROTONIX) 40 mg tablet TAKE 1 TABLET DAILY 90 Tablet 3    fexofenadine HCl (ALLEGRA PO) Take  by mouth. BID      atorvastatin (LIPITOR) 40 mg tablet TAKE 1 TABLET NIGHTLY FOR CHOLESTEROL 90 Tablet 3    atenoloL (TENORMIN) 25 mg tablet TAKE 2 TABLETS DAILY 180 Tablet 3    lutein 20 mg tab Take 20 mg by mouth daily. cholecalciferol, vitamin d3, 400 unit cap Take  by mouth.        Allergies   Allergen Reactions    Aricept [Donepezil] Diarrhea    Gabapentin Diarrhea     Pt had severe diarrhea for 2 days after starting      Pneumococcal 23-Roxanne Ps Vaccine Swelling     Family History   Problem Relation Age of Onset    Hypertension Sister     Stroke Mother     Cancer Father      Social History     Tobacco Use    Smoking status: Never    Smokeless tobacco: Never   Substance Use Topics    Alcohol use: No     Patient Active Problem List   Diagnosis Code    Hyperlipidemia E78.5    Hypothyroidism E03.9    Hypertension I10    PVCs (premature ventricular contractions) I49.3    Age-related osteoporosis without current pathological fracture M81.0    Breast cancer, right ([facility] C50.911    Alzheimer's disease, unspecified G30.9       Depression Risk Factor Screening:     3 most recent PHQ Screens 9/23/2022   Little interest or pleasure in doing things Not at all   Feeling down, depressed, irritable, or hopeless Not at all   Total Score PHQ 2 0   Trouble falling or staying asleep, or sleeping too much -   Feeling tired or having little energy -   Poor appetite, weight loss, or overeating - Feeling bad about yourself - or that you are a failure or have let yourself or your family down -   Trouble concentrating on things such as school, work, reading, or watching TV -   Moving or speaking so slowly that other people could have noticed; or the opposite being so fidgety that others notice -   Thoughts of being better off dead, or hurting yourself in some way -   PHQ 9 Score -     Alcohol Risk Factor Screening: On any occasion during the past 3 months, have you had more than 3 drinks containing alcohol?  no    Do you average more than 7 drinks per week?  no      Functional Ability and Level of Safety:     Hearing Loss   none    Activities of Daily Living   Self-care. Requires assistance with: no ADLs    Fall Risk     Fall Risk Assessment, last 12 mths 9/23/2022   Able to walk? Yes   Fall in past 12 months? 0   Do you feel unsteady? 0   Are you worried about falling 0     Abuse Screen   Patient is not abused    Review of Systems   ROS:  As listed in HPI. In addition:  Constitutional:   No headache, fever, fatigue, weight loss or weight gain      Eyes:   No redness, pruritis, pain, visual changes, swelling, or discharge      Ears:    No pain, loss or changes in hearing     Cardiac:    No chest pain      Resp:   No cough or shortness of breath      Neuro   No loss of consciousness, dizziness, seizure    Physical Examination     Evaluation of Cognitive Function:  Mood/affect:  happy  Appearance: age appropriate  Family member/caregiver input:     Physical Exam:  Blood pressure (!) 145/77, pulse 64, temperature 97.9 °F (36.6 °C), temperature source Temporal, resp. rate 16, height 5' 1\" (1.549 m), weight 123 lb 3.2 oz (55.9 kg), last menstrual period 01/06/1996, SpO2 97 %. GEN: No apparent distress. Alert and oriented and responds to all questions appropriately.    NECK:  Supple; no masses; thyroid normal           LUNGS: Respirations unlabored; clear to auscultation bilaterally  CARDIOVASCULAR: Regular, rate, and rhythm without murmurs   ABDOMEN: Soft; nontender; nondistended; normoactive bowel sounds; no masses or organomegaly  NEUROLOGIC:  No focal neurologic deficits. Strength and sensation grossly intact. Coordination and gait grossly intact. EXT: Well perfused. No edema. SKIN: No obvious rashes. Patient Care Team:  Clinton Mortensen MD as PCP - General (Family Medicine)  Clinton Mortensen MD as PCP - [facility]  Shira Truong MD as Physician (Sleep Medicine Physician)  Andie Pineda MD as Physician (Hematology and Oncology)  Corinne Munguia MD as Physician (Radiation Oncology)  Sharon Lynch MD as Physician (Surgery Physician)  Tiffani Fuller NP (Nurse Practitioner)    Advice/Referrals/Counseling   Education and counseling provided:    DEXA scan  Last done 2017. Has completed 5 years of Fosamax. Check a DEXA scan and consider stopping Fosamax. Flu shot today. COVID booster when available    Assessment/Plan     Hypertension  Little elevated today. Typically well controlled. Has a blood pressure cuff at home but has not been using it  Was in the emergency room for syncope last week and only significant finding was bradycardia. Hesitate to make any changes to her medication today. The emergency room visit was likely either a vagal response or dehydration. Consider the possibility of dose decreased on atenolol (PVCs)    Hyperlipidemia  Atorvastatin. Hypothyroid  Synthroid dose changed in August.  Check TSH and T4 today. Memory problem  Care of neurology  Neuropsych testing pending  [address] [facility]      ICD-10-CM ICD-9-CM    1. Routine general medical examination at a health care facility  Z00.00 V70.0       2. Alzheimer's disease, unspecified (CODE) ([facility]  G30.9 331.0       3. Primary hypertension  I10 401.9       4. Memory change  R41.3 780.93       5.  Acquired hypothyroidism  E03.9 244.9 TSH 3RD GENERATION      T4, FREE      6. Age-related osteoporosis without current pathological fracture  M81.0 733.01 DEXA BONE DENSITY STUDY AXIAL      7.  Encounter for immunization  Z23 V03.89 INFLUENZA, FLUAD, (AGE 72 Y+), IM, PF, 0.5 ML      ADMIN INFLUENZA VIRUS VAC

## 2022-10-06 ENCOUNTER — HOSPITAL ENCOUNTER (OUTPATIENT)
Dept: MAMMOGRAPHY | Age: 77
Discharge: HOME | End: 2022-10-06
Attending: SURGERY
Payer: MEDICARE

## 2022-10-06 DIAGNOSIS — Z17.0 MALIGNANT NEOPLASM OF UPPER-OUTER QUADRANT OF RIGHT BREAST IN FEMALE, ESTROGEN RECEPTOR POSITIVE (HCC): ICD-10-CM

## 2022-10-06 DIAGNOSIS — C50.411 MALIGNANT NEOPLASM OF UPPER-OUTER QUADRANT OF RIGHT BREAST IN FEMALE, ESTROGEN RECEPTOR POSITIVE (HCC): ICD-10-CM

## 2022-10-06 PROCEDURE — 77062 BREAST TOMOSYNTHESIS BI: CPT

## 2022-10-17 ENCOUNTER — NURSE TRIAGE (OUTPATIENT)
Dept: OTHER | Facility: CLINIC | Age: 77
End: 2022-10-17

## 2022-10-17 NOTE — TELEPHONE ENCOUNTER
[place]    Received call from Valeri at [facility] with Red Flag Complaint. Subjective: Caller states \"Low blood pressure\"     Current Symptoms:   Caller reports the patient's diastolic blood pressure has been around 60 after taking her HTN meds which is unusual for her. BP today is 162/75 and 135/72. Patient has not taken her HTN meds yet today. Fatigue  Patient takes Amlodipine, Atenolol, and HCTZ. Onset: 10 days ago; unchanged    Pain Severity: NA    Temperature: Denies    Recommended disposition: See PCP within 3 Days    Care advice provided, patient verbalizes understanding; denies any other questions or concerns; instructed to call back for any new or worsening symptoms. Patient/Caller agrees with recommended disposition; writer provided warm transfer to [employer] at [facility] for appointment scheduling    Attention Provider: Thank you for allowing me to participate in the care of your patient. The patient was connected to triage in response to information provided to the [facility]. Please do not respond through this encounter as the response is not directed to a shared pool.   Reason for Disposition   Wants doctor to measure BP    Protocols used: Blood Pressure - Low-ADULT-OH

## 2022-10-18 ENCOUNTER — VIRTUAL VISIT (OUTPATIENT)
Dept: FAMILY MEDICINE CLINIC | Age: 77
End: 2022-10-18
Payer: MEDICARE

## 2022-10-18 DIAGNOSIS — I10 PRIMARY HYPERTENSION: Primary | ICD-10-CM

## 2022-10-18 PROCEDURE — 3017F COLORECTAL CA SCREEN DOC REV: CPT | Performed by: NURSE PRACTITIONER

## 2022-10-18 PROCEDURE — G8756 NO BP MEASURE DOC: HCPCS | Performed by: NURSE PRACTITIONER

## 2022-10-18 PROCEDURE — G8427 DOCREV CUR MEDS BY ELIG CLIN: HCPCS | Performed by: NURSE PRACTITIONER

## 2022-10-18 PROCEDURE — 1101F PT FALLS ASSESS-DOCD LE1/YR: CPT | Performed by: NURSE PRACTITIONER

## 2022-10-18 PROCEDURE — 1123F ACP DISCUSS/DSCN MKR DOCD: CPT | Performed by: NURSE PRACTITIONER

## 2022-10-18 PROCEDURE — 1090F PRES/ABSN URINE INCON ASSESS: CPT | Performed by: NURSE PRACTITIONER

## 2022-10-18 PROCEDURE — G8432 DEP SCR NOT DOC, RNG: HCPCS | Performed by: NURSE PRACTITIONER

## 2022-10-18 PROCEDURE — 99213 OFFICE O/P EST LOW 20 MIN: CPT | Performed by: NURSE PRACTITIONER

## 2022-10-18 PROCEDURE — G0463 HOSPITAL OUTPT CLINIC VISIT: HCPCS | Performed by: NURSE PRACTITIONER

## 2022-10-18 NOTE — PROGRESS NOTES
Elicia Avilez (: 1945) is a 78 y.o. female, established patient, here for evaluation of the following chief complaint(s):   Medication Evaluation and Hypertension       ASSESSMENT/PLAN:  Below is the assessment and plan developed based on review of pertinent history, labs, studies, and medications. 1. Primary hypertension    No symptoms. Will have her come in tomorrow for BP check as nurse visit at 1 pm.  She will bring her machine in with her then as well. DASH diet discussed, take all medicines as prescribed. Avoid too much caffeine and NSAID use     Return in about 1 day (around 10/19/2022), or if symptoms worsen or fail to improve. SUBJECTIVE/OBJECTIVE:  HPI    Patient called yesterday for concerns about her BP being a little elevated. She reports that her BP was 165/70 at 1120 am  this AM,  Not eating extra salt or changes in her diet.   No HA or palpitations or CP, no swelling or changes in vision          Review of Systems   Gen: mild fatigue, -fever, -chills  Eyes: no excessive tearing, itching, or discharge  Nose: no rhinorrhea, no sinus pain  Mouth: no oral lesions, no sore throat  Resp: no shortness of breath, no wheezing, no cough  CV: no chest pain, no paroxysmal nocturnal dyspnea  Abd: no nausea, no heartburn, no diarrhea, no constipation, no abdominal pain  Neuro: no headaches, no syncope or presyncopal episodes  Endo: no polyuria, no polydipsia  Heme: no lymphadenopathy, no easy bruising or bleeding    No data recorded     Physical Exam    [INSTRUCTIONS:  \"[x]\" Indicates a positive item  \"[]\" Indicates a negative item  -- DELETE ALL ITEMS NOT EXAMINED]    Constitutional: [x] Appears well-developed and well-nourished [x] No apparent distress      [] Abnormal -     Mental status: [x] Alert and awake  [x] Oriented to person/place/time [x] Able to follow commands    [] Abnormal -     Eyes:   EOM    [x]  Normal    [] Abnormal -   Sclera  [x]  Normal    [] Abnormal -          Discharge [x]  None visible   [] Abnormal -     HENT: [x] Normocephalic, atraumatic  [] Abnormal -   [x] Mouth/Throat: Mucous membranes are moist    External Ears [x] Normal  [] Abnormal -    Neck: [x] No visualized mass [] Abnormal -     Pulmonary/Chest: [x] Respiratory effort normal   [x] No visualized signs of difficulty breathing or respiratory distress        [] Abnormal -      Musculoskeletal:   [x] Normal gait with no signs of ataxia         [x] Normal range of motion of neck        [] Abnormal -     Neurological:        [x] No Facial Asymmetry (Cranial nerve 7 motor function) (limited exam due to video visit)          [x] No gaze palsy        [] Abnormal -          Skin:        [x] No significant exanthematous lesions or discoloration noted on facial skin         [] Abnormal -            Psychiatric:       [x] Normal Affect [] Abnormal -        [x] No Hallucinations        Nava Mash, was evaluated through a synchronous (real-time) audio-video encounter. The patient (or guardian if applicable) is aware that this is a billable service, which includes applicable co-pays. This Virtual Visit was conducted with patient's (and/or legal guardian's) consent. The visit was conducted pursuant to the emergency declaration under the [address] [place] [address] authority and the [employer] and [employer] General Act. Patient identification was verified, and a caregiver was present when appropriate. The patient was located at: Home: [address] [place] [id number]  The provider was located at: [facility] [address]       An electronic signature was used to authenticate this note.   -- Rodney Huff NP

## 2022-10-18 NOTE — PROGRESS NOTES
Chief Complaint   Patient presents with    Medication Evaluation     1. Have you been to the ER, urgent care clinic since your last visit? Hospitalized since your last visit? No    2. Have you seen or consulted any other health care providers outside of the [address] [place] [address] since your last visit? Include any pap smears or colon screening.  Oncology, Dentist, [address] [id number] Maintenance Due   Topic Date Due    DTaP/Tdap/Td series (1 - Tdap) Never done    COVID-19 Vaccine (4 - Booster for [employer] series) 02/06/2022

## 2022-10-19 ENCOUNTER — CLINICAL SUPPORT (OUTPATIENT)
Dept: FAMILY MEDICINE CLINIC | Age: 77
End: 2022-10-19

## 2022-10-19 VITALS — DIASTOLIC BLOOD PRESSURE: 68 MMHG | SYSTOLIC BLOOD PRESSURE: 126 MMHG

## 2022-10-19 DIAGNOSIS — I10 PRIMARY HYPERTENSION: Primary | ICD-10-CM

## 2022-10-19 NOTE — PROGRESS NOTES
Blood pressure check and pt's BP machine checked. Per Brittanie, BP is good and no change in medications.

## 2022-11-09 ENCOUNTER — TELEPHONE (OUTPATIENT)
Dept: FAMILY MEDICINE CLINIC | Age: 77
End: 2022-11-09

## 2022-11-09 NOTE — TELEPHONE ENCOUNTER
verified. She states that it was making her sleep too much or \"drugged out\". She has stopped taking it since last Monday. No illness from the Rx.

## 2022-11-09 NOTE — TELEPHONE ENCOUNTER
Try to get more information. Lexapro was started January 2022 and I would not expect it to start giving her side effects recently. This medication was very low-dose and may not have been helping her anxiety.   If she is having intolerable anxiety we may want to go up on the dose

## 2022-11-09 NOTE — TELEPHONE ENCOUNTER
Pt's  is calling on Pt's behalf;   -pt stopped medication last Monday because the pt wasn't having any anxiety right now; Per Pt's  \"please advise\"   -pt was not herself; medication was making her sick (anxiety medication)  -Please call pt back preferably before the EOD      Name of medication: escitalopram oxalate (LEXAPRO) 5 mg tablet    Thank You

## 2022-11-14 RX ORDER — ATORVASTATIN CALCIUM 40 MG/1
TABLET, FILM COATED ORAL
Qty: 90 TABLET | Refills: 3 | Status: SHIPPED | OUTPATIENT
Start: 2022-11-14

## 2022-11-23 RX ORDER — ATENOLOL 25 MG/1
TABLET ORAL
Qty: 180 TABLET | Refills: 3 | Status: SHIPPED | OUTPATIENT
Start: 2022-11-23

## 2023-01-09 ENCOUNTER — OFFICE VISIT (OUTPATIENT)
Dept: SURGERY | Age: 78
End: 2023-01-09
Payer: MEDICARE

## 2023-01-09 VITALS
HEIGHT: 61 IN | HEART RATE: 65 BPM | WEIGHT: 130 LBS | SYSTOLIC BLOOD PRESSURE: 168 MMHG | DIASTOLIC BLOOD PRESSURE: 85 MMHG | BODY MASS INDEX: 24.55 KG/M2

## 2023-01-09 DIAGNOSIS — Z98.890 S/P LUMPECTOMY OF BREAST: ICD-10-CM

## 2023-01-09 DIAGNOSIS — C50.411 MALIGNANT NEOPLASM OF UPPER-OUTER QUADRANT OF RIGHT BREAST IN FEMALE, ESTROGEN RECEPTOR POSITIVE (HCC): Primary | ICD-10-CM

## 2023-01-09 DIAGNOSIS — Z92.3 S/P RADIATION THERAPY: ICD-10-CM

## 2023-01-09 DIAGNOSIS — Z85.3 HISTORY OF BREAST CANCER IN FEMALE: ICD-10-CM

## 2023-01-09 DIAGNOSIS — Z17.0 MALIGNANT NEOPLASM OF UPPER-OUTER QUADRANT OF RIGHT BREAST IN FEMALE, ESTROGEN RECEPTOR POSITIVE (HCC): Primary | ICD-10-CM

## 2023-01-09 PROCEDURE — G8432 DEP SCR NOT DOC, RNG: HCPCS | Performed by: NURSE PRACTITIONER

## 2023-01-09 PROCEDURE — 3077F SYST BP >= 140 MM HG: CPT | Performed by: NURSE PRACTITIONER

## 2023-01-09 PROCEDURE — 99213 OFFICE O/P EST LOW 20 MIN: CPT | Performed by: NURSE PRACTITIONER

## 2023-01-09 PROCEDURE — 1090F PRES/ABSN URINE INCON ASSESS: CPT | Performed by: NURSE PRACTITIONER

## 2023-01-09 PROCEDURE — 3079F DIAST BP 80-89 MM HG: CPT | Performed by: NURSE PRACTITIONER

## 2023-01-09 PROCEDURE — 1101F PT FALLS ASSESS-DOCD LE1/YR: CPT | Performed by: NURSE PRACTITIONER

## 2023-01-09 PROCEDURE — 1123F ACP DISCUSS/DSCN MKR DOCD: CPT | Performed by: NURSE PRACTITIONER

## 2023-01-09 PROCEDURE — 3017F COLORECTAL CA SCREEN DOC REV: CPT | Performed by: NURSE PRACTITIONER

## 2023-01-09 PROCEDURE — G8427 DOCREV CUR MEDS BY ELIG CLIN: HCPCS | Performed by: NURSE PRACTITIONER

## 2023-01-09 PROCEDURE — G8536 NO DOC ELDER MAL SCRN: HCPCS | Performed by: NURSE PRACTITIONER

## 2023-01-09 PROCEDURE — G8420 CALC BMI NORM PARAMETERS: HCPCS | Performed by: NURSE PRACTITIONER

## 2023-01-09 NOTE — PROGRESS NOTES
HISTORY OF PRESENT ILLNESS  Yumiko Owen is a 78 y.o. female. HPI Established patient presents for follow-up for RIGHT breast cancer. Denies breast mass, skin changes, nipple discharge and pain. Breast history -   Referring - Dr. Savannah Spencer  10/13/21 - RIGHT breast biopsy - ILC - grade 1, ER 99, MI 0, Ki 67 5%, Her2 equivocal, FISH negative   11/18/21 - RIGHT breast core biopsy -   Benign breast tissue. No discrete lesion or mass identified   12/15/21 - RIGHT breast lumpectomy and SLNB - Dr. Chino Montoya  1.5cm [facility]; node negative 0/2; T1cN0  2/21/22 - Completed adjuvant radiation   3/7/22 - started adjuvant letrozole - Dr. Natalie Stahl      Family history -   Father - colon cancer      OB History    No obstetric history on file. Obstetric Comments   Menarche 17, LMP 58, # of children 1, age of 4st delivery 23, Hysterectomy/oophorectomy Yes/Yes, Breast bx No, history of breast feeding No, BCP No, Hormone therapy No                 Past Surgical History:   Procedure Laterality Date    COLONOSCOPY N/A 06/07/2019    COLONOSCOPY performed by Iona Olmos MD at [facility]    HX BREAST LUMPECTOMY Right 12/16/2021    RIGHT BREAST LUMPECTOMY WITH ULTRASOUND AND RIGHT BREAST SENTINEL NODE BIOPSY performed by Kaylin Powers MD at [facility] AMBULATORY OR    HX GYN      hysterectomy    HX GYN      vaginal birth x 1    HX HYSTERECTOMY           NEGIN Results (most recent):  Results from Hospital Encounter encounter on 10/06/22    NEGIN 3D AGUSTÍN W MAMMO BI DX INCL CAD    Narrative  STUDY:  Bilateral Diagnostic Digital Mammography    INDICATION:  Right lumpectomy for carcinoma December 2021    COMPARISON:  Prior mammograms 2021 and 2019    BREAST COMPOSITION: There are scattered fibroglandular densities. FINDINGS: Bilateral digital diagnostic mammography was performed, and is  interpreted in conjunction with a computer assisted detection (CAD) system.   Additionally, tomosynthesis of both breasts in the CC and MLO projections was  performed. Lumpectomy changes are noted in the right breast upper outer  quadrant. No new masses or suspicious calcifications are identified. In the  left breast there are no suspicious masses or microcalcifications. Impression  1. BI-RADS Assessment Category 2: Benign finding. Right lumpectomy scar is  benign. Recommendations:  Bilateral diagnostic mammograms in one year. The findings of this exam and the recommendation were directly discussed with  the patient at the time of exam by myself. ROS    Physical Exam  Constitutional:       Appearance: Normal appearance. Chest:   Breasts:     Right: No mass, nipple discharge, skin change or tenderness. Left: No mass, nipple discharge, skin change or tenderness. Comments: RIGHT breast and axilla - well healed surgical incisions  Musculoskeletal:      Comments: FROM - UE x 2   Lymphadenopathy:      Upper Body:      Right upper body: No supraclavicular or axillary adenopathy. Left upper body: No supraclavicular or axillary adenopathy. Neurological:      Mental Status: She is alert. Psychiatric:         Attention and Perception: Attention normal.         Mood and Affect: Mood normal.         Speech: Speech normal.         Behavior: Behavior normal.       Visit Vitals  BP (!) 168/85 (BP 1 Location: Right arm, BP Patient Position: Sitting, BP Cuff Size: Small adult)   Pulse 65   Ht 5' 1\" (1.549 m)   Wt 130 lb (59 kg)   LMP 01/06/1996 (Exact Date)   BMI 24.56 kg/m²       ASSESSMENT and PLAN    ICD-10-CM ICD-9-CM    1. Malignant neoplasm of upper-outer quadrant of right breast in female, estrogen receptor positive (HCC)  C50.411 174.4     Z17.0 V86.0       2. S/P lumpectomy of breast  Z98.890 V45.89       3. S/P radiation therapy  Z92.3 V66.1       4. History of breast cancer in female  Z80.3 V10.3 NEGIN 3D AGUSTÍN W MAMMO BI DX INCL CAD        Normal exam with no evidence of breast malignancy.   Continues on letrozole and is tolerating well. BDmammogram 3D in 10/2023 - ordered. RTC here in 6 months or sooner PRN. She is comfortable with this plan. All questions answered and she stated understanding. Total time spent for this patient - 20 minutes.

## 2023-01-19 ENCOUNTER — OFFICE VISIT (OUTPATIENT)
Dept: ONCOLOGY | Age: 78
End: 2023-01-19
Payer: MEDICARE

## 2023-01-19 VITALS
SYSTOLIC BLOOD PRESSURE: 133 MMHG | DIASTOLIC BLOOD PRESSURE: 75 MMHG | HEIGHT: 61 IN | BODY MASS INDEX: 24.66 KG/M2 | WEIGHT: 130.6 LBS | OXYGEN SATURATION: 98 % | TEMPERATURE: 98.2 F | HEART RATE: 60 BPM

## 2023-01-19 DIAGNOSIS — C50.411 MALIGNANT NEOPLASM OF UPPER-OUTER QUADRANT OF RIGHT BREAST IN FEMALE, ESTROGEN RECEPTOR POSITIVE (HCC): Primary | ICD-10-CM

## 2023-01-19 DIAGNOSIS — Z17.0 MALIGNANT NEOPLASM OF UPPER-OUTER QUADRANT OF RIGHT BREAST IN FEMALE, ESTROGEN RECEPTOR POSITIVE (HCC): Primary | ICD-10-CM

## 2023-01-19 PROCEDURE — 1090F PRES/ABSN URINE INCON ASSESS: CPT | Performed by: INTERNAL MEDICINE

## 2023-01-19 PROCEDURE — 99213 OFFICE O/P EST LOW 20 MIN: CPT | Performed by: INTERNAL MEDICINE

## 2023-01-19 PROCEDURE — 3017F COLORECTAL CA SCREEN DOC REV: CPT | Performed by: INTERNAL MEDICINE

## 2023-01-19 PROCEDURE — G8427 DOCREV CUR MEDS BY ELIG CLIN: HCPCS | Performed by: INTERNAL MEDICINE

## 2023-01-19 PROCEDURE — 3078F DIAST BP <80 MM HG: CPT | Performed by: INTERNAL MEDICINE

## 2023-01-19 PROCEDURE — 3075F SYST BP GE 130 - 139MM HG: CPT | Performed by: INTERNAL MEDICINE

## 2023-01-19 PROCEDURE — 1101F PT FALLS ASSESS-DOCD LE1/YR: CPT | Performed by: INTERNAL MEDICINE

## 2023-01-19 PROCEDURE — G8432 DEP SCR NOT DOC, RNG: HCPCS | Performed by: INTERNAL MEDICINE

## 2023-01-19 PROCEDURE — G8420 CALC BMI NORM PARAMETERS: HCPCS | Performed by: INTERNAL MEDICINE

## 2023-01-19 PROCEDURE — G0463 HOSPITAL OUTPT CLINIC VISIT: HCPCS | Performed by: INTERNAL MEDICINE

## 2023-01-19 PROCEDURE — G8536 NO DOC ELDER MAL SCRN: HCPCS | Performed by: INTERNAL MEDICINE

## 2023-01-19 PROCEDURE — 1123F ACP DISCUSS/DSCN MKR DOCD: CPT | Performed by: INTERNAL MEDICINE

## 2023-01-19 NOTE — PROGRESS NOTES
Humaira Spears is a 78 y.o. female here for follow up for right breast cancer. She is taking Letrozole. She is taking everyday. Pt states she is doing well. Her memory seems to have improved. No concerns brought up. 1. Have you been to the ER, urgent care clinic since your last visit? Hospitalized since your last visit? no    2. Have you seen or consulted any other health care providers outside of the [address] [place] [address] since your last visit? Include any pap smears or colon screening.   no

## 2023-01-19 NOTE — LETTER
1/19/2023    Patient: Bala Perez   YOB: 1945   Date of Visit: 1/19/2023     Kodi Webb MD  [address]  [address] [facility] [address]    Dear Kodi Webb MD,      Thank you for referring Ms. Marie Cleveland to [address] [place] [address] for evaluation. My notes for this consultation are attached. If you have questions, please do not hesitate to call me. I look forward to following your patient along with you.       Sincerely,    Romana Chance, MD

## 2023-01-19 NOTE — PROGRESS NOTES
[address] [facility] [address], [address] [facility], [address] [facility], [address] [facility]  [phone]    Oncology Note        Patient: Chico Gonzalez MRN: [id number]  SSN: [id number]    YOB: 1945  Age: 78 y.o. Sex: female        Diagnosis:     1. Right breast carcinoma: Dx: 10/2021  T1c N0 (Stage I) invasive lobular carcinoma, Tumor size 15 mm, LN -ve, grade 1, ER 99%, CT 0%, Her 2 2+, FISH -ve. DCIS present. Treatment:     1. S/P right breast lumpectomy 12/15/2021  2. Completed adjuvant radiation 02/21/2022  3. Adjuvant Letrozole, start 03/07/2022    Subjective:      Chico Gonzalez is a 78 y.o. female who I am seeing for a new diagnosis of right sided invasive lobular carcinoma. She underwent a routine screening mammogram. An abnormal mammogram led to a biopsy which revealed invasive lobular carcinoma. She then underwent a right breast lumpectomy 12/16/2021. She completed adjuvant radiation in Feb 2022. She is taking Letrozole. Review of Systems:    Constitutional: negative  Eyes: negative  Ears, Nose, Mouth, Throat, and Face: negative  Respiratory: negative  Cardiovascular: negative  Gastrointestinal: negative  Genitourinary:negative  Integument/Breast: negative  Hematologic/Lymphatic: negative  Musculoskeletal:negative  Neurological: negative    Review of systems was reviewed and updated as needed on 01/19/23.       Past Medical History:   Diagnosis Date    Anxiety     Arrhythmia     Arthritis     Breast cancer ([facility] [address]     right side    GERD (gastroesophageal reflux disease)     High cholesterol     HTN (hypertension)     Hypothyroid     Memory deficit     Menopause     Radiation therapy complication     Shingles outbreak      Past Surgical History:   Procedure Laterality Date    COLONOSCOPY N/A 06/07/2019    COLONOSCOPY performed by Michoacano Campos MD at [facility] ENDOSCOPY    HX BREAST LUMPECTOMY Right 12/16/2021    RIGHT BREAST LUMPECTOMY WITH ULTRASOUND AND RIGHT BREAST SENTINEL NODE BIOPSY performed by Terra James MD at [facility] AMBULATORY OR    HX GYN      hysterectomy    HX GYN      vaginal birth x 1    HX HYSTERECTOMY        Family History   Problem Relation Age of Onset    Hypertension Sister     Stroke Mother     Cancer Father      Social History     Tobacco Use    Smoking status: Never    Smokeless tobacco: Never   Substance Use Topics    Alcohol use: No      Prior to Admission medications    Medication Sig Start Date End Date Taking? Authorizing Provider   atenoloL (TENORMIN) 25 mg tablet TAKE 2 TABLETS DAILY 11/23/22  Yes Petrona Vazquez MD   atorvastatin (LIPITOR) 40 mg tablet TAKE 1 TABLET NIGHTLY FOR CHOLESTEROL 11/14/22  Yes Petrona Vazquez MD   amLODIPine (NORVASC) 2.5 mg tablet Take 1 Tablet by mouth daily. 9/16/22  Yes Brittanie Floyd NP   escitalopram oxalate (LEXAPRO) 5 mg tablet Take 1 Tablet by mouth daily. 9/16/22  Yes Brittanie Floyd NP   letrozole (FEMARA) 2.5 mg tablet TAKE 1 TABLET DAILY 9/14/22  Yes Emily Nogueira NP   memantine (Namenda) 10 mg tablet Take 1 Tablet by mouth two (2) times a day. 8/25/22  Yes Diamond Hanley NP   levothyroxine (SYNTHROID) 100 mcg tablet Take 1 Tablet by mouth Daily (before breakfast). 8/10/22  Yes Brittanie Floyd NP   hydroCHLOROthiazide (HYDRODIURIL) 12.5 mg tablet TAKE 1 TABLET DAILY 6/24/22  Yes Brittanie Floyd NP   alendronate (FOSAMAX) 70 mg tablet TAKE 1 TABLET EVERY 7 DAYS 2/11/22  Yes Petrona Vazquez MD   coenzyme q10 10 mg cap Take  by mouth. Yes Provider, Historical   ascorbic acid, vitamin C, (VITAMIN C) 500 mg tablet Take  by mouth. Yes Provider, Historical   b complex-vitamin c-folic acid (NEPHROCAPS) 1 mg capsule Take 1 Capsule by mouth daily. Yes Provider, Historical   aspirin 81 mg chewable tablet Take 81 mg by mouth daily.    Yes Provider, Historical   pantoprazole (PROTONIX) 40 mg tablet TAKE 1 TABLET DAILY 1/25/22  Yes Petrona Vazquez MD   fexofenadine HCl (ALLEGRA PO) Take  by mouth. BID   Yes Provider, Historical   lutein 20 mg tab Take 20 mg by mouth daily. Yes Provider, Historical   cholecalciferol, vitamin d3, 400 unit cap Take  by mouth. Yes Provider, Historical              Allergies   Allergen Reactions    Aricept [Donepezil] Diarrhea    Gabapentin Diarrhea     Pt had severe diarrhea for 2 days after starting      Pneumococcal 23-Roxanne Ps Vaccine Swelling           Objective:     Vitals:    01/19/23 1312   BP: 133/75   Pulse: 60   Temp: 98.2 °F (36.8 °C)   TempSrc: Temporal   SpO2: 98%   Weight: 130 lb 9.6 oz (59.2 kg)   Height: 5' 1\" (1.549 m)      Pain Scale: 0 - No pain/10    Physical Exam:    GENERAL: alert, cooperative, no distress, appears stated age  EYE: conjunctivae/corneas clear. LYMPHATIC: Cervical, supraclavicular, and axillary nodes normal.   THROAT & NECK: normal and no erythema or exudates noted. LUNG: clear to auscultation bilaterally  HEART: regular rate and rhythm  ABDOMEN: soft, non-tender. Bowel sounds normal. No masses,  no organomegaly  EXTREMITIES:  extremities normal, atraumatic, no cyanosis or edema  SKIN: Normal.  NEUROLOGIC: AOx3. Gait normal.    The above physical exam was reviewed and updated as needed on 01/19/23. Assessment:     1. Right breast carcinoma: Dx: 10/2021  T1c N0 (Stage I) invasive lobular carcinoma, Tumor size 15 mm, LN -ve, grade 1, ER 99%, CT 0%, Her 2 2+, FISH -ve. DCIS present. ECOG PS 0  Intent of Treatment - curative  Prognosis - excellent    S/P right breast lumpectomy 12/15/2021  Adjuvant radiation to the right breast on 03/21/2022  Letrozole. Tolerating treatment  In remission      2. Osteoporosis    On Fosamax. Plan:     1. Continue Letrozole. 2. Return in 6 months  3. Mammogram scheduled for 10/6/2022      Signed by: Nina Pedraza MD                     January 19, 2023        CC. Maya Yeager MD  CC.  Flor Bonilla MD

## 2023-01-23 RX ORDER — ALENDRONATE SODIUM 70 MG/1
TABLET ORAL
Qty: 12 TABLET | Refills: 3 | Status: SHIPPED | OUTPATIENT
Start: 2023-01-23

## 2023-01-23 NOTE — TELEPHONE ENCOUNTER
Refill Request (pt's  calling on pt's behalf)   -pt is out of medication   Requested Prescriptions     Pending Prescriptions Disp Refills    alendronate (FOSAMAX) 70 mg tablet 12 Tablet 3       Thank You

## 2023-01-30 RX ORDER — PANTOPRAZOLE SODIUM 40 MG/1
TABLET, DELAYED RELEASE ORAL
Qty: 90 TABLET | Refills: 3 | Status: SHIPPED | OUTPATIENT
Start: 2023-01-30

## 2023-02-05 DIAGNOSIS — E03.9 ACQUIRED HYPOTHYROIDISM: ICD-10-CM

## 2023-02-06 RX ORDER — LEVOTHYROXINE SODIUM 100 UG/1
TABLET ORAL
Qty: 90 TABLET | Refills: 3 | Status: SHIPPED | OUTPATIENT
Start: 2023-02-06

## 2023-03-01 DIAGNOSIS — I10 PRIMARY HYPERTENSION: ICD-10-CM

## 2023-03-01 RX ORDER — AMLODIPINE BESYLATE 2.5 MG/1
2.5 TABLET ORAL DAILY
Qty: 90 TABLET | Refills: 1 | Status: SHIPPED | OUTPATIENT
Start: 2023-03-01

## 2023-03-01 NOTE — TELEPHONE ENCOUNTER
PCP: Iris Tomlin MD    Last appt: 10/19/2022  Future Appointments   Date Time Provider Boni Murphy   3/2/2023 10:00 AM Pauline Santillan [facility]   3/2/2023  1:00 PM Pauline Santillan [facility]   3/16/2023  9:00 AM Tico Lucy, PIPE GARZA BS AMB   7/10/2023 10:00 AM Thomas Spence, PIPE [facility]   10/2/2023 10:30 AM [address]       Requested Prescriptions     Pending Prescriptions Disp Refills    amLODIPine (NORVASC) 2.5 mg tablet 90 Tablet 1     Sig: Take 1 Tablet by mouth daily.        Prior labs and Blood pressures:  BP Readings from Last 3 Encounters:   01/19/23 133/75   01/09/23 (!) 168/85   10/19/22 126/68     Lab Results   Component Value Date/Time    Sodium 138 09/14/2022 06:09 AM    Potassium 3.2 (L) 09/14/2022 06:09 AM    Chloride 106 09/14/2022 06:09 AM    CO2 25 09/14/2022 06:09 AM    Anion gap 7 09/14/2022 06:09 AM    Glucose 104 (H) 09/14/2022 06:09 AM    BUN 12 09/14/2022 06:09 AM    Creatinine 0.76 09/14/2022 06:09 AM    BUN/Creatinine ratio 16 09/14/2022 06:09 AM    GFR est AA >60 09/14/2022 06:09 AM    GFR est non-AA >60 09/14/2022 06:09 AM    Calcium 8.4 (L) 09/14/2022 06:09 AM     Lab Results   Component Value Date/Time    Hemoglobin A1c 5.4 12/07/2011 12:00 AM     Lab Results   Component Value Date/Time    Cholesterol, total 148 05/09/2022 01:55 PM    HDL Cholesterol 59 05/09/2022 01:55 PM    LDL, calculated 65 05/09/2022 01:55 PM    VLDL, calculated 24 05/09/2022 01:55 PM    Triglyceride 120 05/09/2022 01:55 PM    CHOL/HDL Ratio 2.5 05/09/2022 01:55 PM     Lab Results   Component Value Date/Time    Vitamin D 25-Hydroxy 41.0 05/09/2022 01:55 PM       Lab Results   Component Value Date/Time    TSH 0.57 09/23/2022 10:23 AM

## 2023-03-02 ENCOUNTER — OFFICE VISIT (OUTPATIENT)
Dept: NEUROLOGY | Age: 78
End: 2023-03-02
Payer: MEDICARE

## 2023-03-02 DIAGNOSIS — G30.9 ALZHEIMER'S DISEASE, UNSPECIFIED (CODE) (HCC): Primary | ICD-10-CM

## 2023-03-02 DIAGNOSIS — G30.1 LATE ONSET ALZHEIMER'S DEMENTIA WITH ANXIETY, UNSPECIFIED DEMENTIA SEVERITY (HCC): Primary | ICD-10-CM

## 2023-03-02 DIAGNOSIS — F02.84 LATE ONSET ALZHEIMER'S DEMENTIA WITH ANXIETY, UNSPECIFIED DEMENTIA SEVERITY (HCC): Primary | ICD-10-CM

## 2023-03-02 PROCEDURE — 96139 PSYCL/NRPSYC TST TECH EA: CPT | Performed by: CLINICAL NEUROPSYCHOLOGIST

## 2023-03-02 PROCEDURE — G8432 DEP SCR NOT DOC, RNG: HCPCS | Performed by: CLINICAL NEUROPSYCHOLOGIST

## 2023-03-02 PROCEDURE — 96138 PSYCL/NRPSYC TECH 1ST: CPT | Performed by: CLINICAL NEUROPSYCHOLOGIST

## 2023-03-02 PROCEDURE — 96136 PSYCL/NRPSYC TST PHY/QHP 1ST: CPT | Performed by: CLINICAL NEUROPSYCHOLOGIST

## 2023-03-02 PROCEDURE — G8420 CALC BMI NORM PARAMETERS: HCPCS | Performed by: CLINICAL NEUROPSYCHOLOGIST

## 2023-03-02 PROCEDURE — G8536 NO DOC ELDER MAL SCRN: HCPCS | Performed by: CLINICAL NEUROPSYCHOLOGIST

## 2023-03-02 PROCEDURE — G8428 CUR MEDS NOT DOCUMENT: HCPCS | Performed by: CLINICAL NEUROPSYCHOLOGIST

## 2023-03-02 PROCEDURE — 90791 PSYCH DIAGNOSTIC EVALUATION: CPT | Performed by: CLINICAL NEUROPSYCHOLOGIST

## 2023-03-02 PROCEDURE — 1123F ACP DISCUSS/DSCN MKR DOCD: CPT | Performed by: CLINICAL NEUROPSYCHOLOGIST
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
## 2023-03-09 ENCOUNTER — DOCUMENTATION ONLY (OUTPATIENT)
Dept: SURGERY | Age: 78
End: 2023-03-09

## 2023-03-09 NOTE — PROGRESS NOTES
Letter mailed on 3/9 notifying patient that appointment with NP raul for 7/10 has been rescheduled for 7/24 at 10:15am.

## 2023-03-13 ENCOUNTER — DOCUMENTATION ONLY (OUTPATIENT)
Dept: NEUROLOGY | Age: 78
End: 2023-03-13

## 2023-03-13 PROBLEM — F02.84: Status: ACTIVE | Noted: 2023-03-13

## 2023-03-13 PROBLEM — G30.1: Status: ACTIVE | Noted: 2023-03-13

## 2023-03-13 NOTE — PROGRESS NOTES
Received fax from [address] for clarification on Donepezil. Faxed note back stating do not dispense this medication. Put note caused diarrhea, not a current medication. Faxed to [phone] and received fax confirmation.

## 2023-03-14 NOTE — PROGRESS NOTES
Neuropsychological Evaluation Report      Patient Name: Lenny Mitchell  YOB: 1945    Age: 78 y.o. Date of Intake: 3/2/2023   Education: 13 Date of Testing: 3/2/2023   Gender: Female Ethnicity: White     Referring Provider: Dr. Diay Taylor:  Lenny Mitchell  was referred for neuropsychological evaluation by her Neurologist to obtain a quantitative assessment of her current level of neurocognitive functioning, assist in differential diagnosis, and aid in individualized treatment planning. The patient understood the rationale and procedures for evaluation, as well as the limits to confidentiality, and they agreed to participate. The patient consented to have this report made available to her  treating providers through her  electronic medical records. This evaluation was completed with the patient by Miguel Bryan PsyD with the exception of testing by technician, which was completed by RACHELE Segovia under the supervision of Dr. Greyson Xiong. History Sources: Patient, Spouse, Medical Records, Rating Scales, and Assessment Instruments    SUMMARY AND IMPRESSION:  The following section is a summary of the patient's pertinent test results and impressions. A more thorough review of the patient's background and test scores can be found below. Results from the patient's neuropsychological evaluation revealed an amnestic memory profile characterized by moderate to advanced impairment (2-3 standard deviations below the mean) on measures of learning and memory with minimal benefit from recognition cueing. Executive functioning and expressive language (particularly semantically related measures) were also noted for moderate to advanced impairment. Mental processing speed was moderately impaired. Visuospatial perception/construction was variable but generally mildly impaired (~1.5 standard deviations below the mean).   Brief attention was within normal limits while working memory was mildly impaired. Simulated assessment of daily functioning and practical judgment were impaired. Brief assessment of psychopathology revealed mild symptoms of anxiety. Diagnostically, the magnitude of impairment revealed by testing and report of declines in daily functioning (corroborated by simulated testing) indicate the patient meets criteria for the diagnosis of dementia. Regarding the etiology of the patient's cognitive impairment, the presence of an amnestic memory profile in addition to expressive language impairment (particularly semantically related tasks) indicate the presence of temporal lobe dysfunction, consistent with Alzheimer's disease. Unfortunately, as both the patient and her  did not appear to have adequate insight into the presence of cognitive difficulties, outside assistance may be needed to assist with making decisions. ASSESSMENT:  Dementia due to Alzheimer's disease    RECOMMENDATIONS:  The patient currently has a feedback session scheduled for 3/15/2023. During this appointment, the results of the evaluation will be reviewed, recommendations will be offered (see below), and the patient will be provided with the opportunity to ask questions. The patient will be encouraged to review the results of this evaluation with her providers and discuss potential implications for treatment. If not already addressed, the patient and her family are encouraged to discuss legal issues such as power of  to assist the patient in future financial and healthcare decisions. Information regarding these issues can be found at www.caringinfo. org or www.agingwithdignity.org/5wishes.html  Tests empirically associated with driving performance were impaired and I would recommend the patient discontinue driving. At a minimum, a formal driving evaluation is recommended and could be obtained through [address] [place] [address].  For more information, please call [phone]. Daily supervision and assistance is recommended. In general, the patient will benefit from a structured, routine environment that will provide assistance with complex activities requiring memory, organization, and planning (e.g., meal preparation, medication management) while allowing them to maintain independence in other basic activities of daily living (e.g., cleaning, dressing, feeding) for as long as possible. Given the patient's current level of functioning, it is anticipated they presently need less than 24-hour care; however, as the disease progresses, their needs for supervision should be reassessed. Continued monitoring and treatment of the patient's neuropsychiatric symptoms/dementia is recommended. Due to their level of cognitive impairment, they will benefit from a combination of psychopharmacology, environmental management, and concrete behaviorally-focused interventions. Examples of environmental strategies include:  Keep a steady routine environment and using visual cues, such as whiteboards, to help provide reminders of tasks. Take additional time to complete tasks and limit the amount of external distracters in the environment when having to focus on a task;  Break larger tasks into smaller ones and take frequent, regularly scheduled breaks; Write down information as soon as possible and to utilize a day planner/calendar, especially when recording doctor's appointments and medicine regimens;  Development of a memory book may be beneficial. Memory books typically contain a day planner/calendar component; however, they also contain organized sections for recording frequently asked questions and information about routines that the patient will be able to access and use independently. Pair behaviors or items to be remembered with well learned patterns or routines.  For example, place medications by your toothbrush so that brushing your teeth will cue you to take your medication; and,  Designate a memory place in which you keep all of your important personal belongings (e.g. wallet, keys) when not in use. The patient will be provided with psychoeducation regarding empirically determined modifiable risk and protective factors for cognitive decline. Specifically:  The patient will be encouraged to engage in approximately 2.5 hours of physician approved physical activity on a weekly basis. The patient will be encouraged to maintain a healthy diet. they will also be informed of the Mediterranean diet, which has been associated with reduced risk for neurodegenerative conditions as well as heart disease. The patient will be encouraged to maintain a cognitively stimulated lifestyle, also incorporating social interaction. The patient's family and caretakers may benefit from reaching out to local support groups in the community. These resources may be able to provide medical assistance or support and reduce the risk of caregiver burnout. The Alzheimer's Association (Phone: 626.349.7335; Website: Jesenia Schofield) provides general information about cognitive decline in late life, along with local resources. The book The 36 Hour Day: A Family Guide to Caring for Persons with Alzheimer's Disease, Related Dementing Illnesses, and Memory Loss in Later Life, by Parish Berger and Andrew Nguyen. HISTORY OF PRESENT ILLNESS:  The patient is a 79-year-old female with pertinent medical history noted for hyperlipidemia, hypothyroidism, hypertension, and Alzheimer's disease. She presented for clinical interview accompanied by her ; however, both the patient and her  appear to be somewhat limited historians. In the patient's perspective, she has not experienced any changes in her cognitive functioning. According to her , he had not been concerned about any changes either until the patient's sister came to him with her concerns.   The patient's  indicated that the patient's sister expressed concerns regarding the patient's short-term episodic memory and he also stated there was an episode where the patient \"exploded\" on her daughter, which is significantly out of character for her. With regard to daily functioning, the patient's  denied concerns related to the patient's driving. The patient and her  also denied problems related to financial management, though he manages bills. The patient's  did indicate the patient has difficulty remembering to take her medications and she requires daily reminders. He also stated she forgets recipes she has known for a long time but does not leave the stove on. She reportedly remains independent with other ADLs and IADLs. Pertaining to the patient's previous psychiatric history, she denied previous clinically significant symptomatology, diagnosis, or treatment. She described her recent mood to be \"not bad. \"  The patient denied history of auditory or visual hallucinations and she denied history of passive or active suicidal ideation, intent, or plan. PERTINENT MEDICAL HISTORY:       Patient Active Problem List   Diagnosis Code    Hyperlipidemia E78.5    Hypothyroidism E03.9    Hypertension I10    PVCs (premature ventricular contractions) I49.3    Age-related osteoporosis without current pathological fracture M81.0    Breast cancer, right (RUSTca 75.) C50.911    Alzheimer's disease, unspecified G30.9      Pertaining to the patient's other medical and physical functioning, she described her sleep to be \"pretty good. \"  She denied problems initiating or sustaining sleep and reported she generally feels rested upon waking. The patient denied the presence of physical problems suggestive of parkinsonism, autonomic dysfunction, or sensory loss. Family neurological history: Positive for CVA in the patient's mother. Positive for dementia and paternal aunts.      Current Outpatient Medications:     amLODIPine (NORVASC) 2.5 mg tablet, Take 1 Tablet by mouth daily. , Disp: 90 Tablet, Rfl: 1    levothyroxine (SYNTHROID) 100 mcg tablet, TAKE 1 TABLET DAILY BEFORE BREAKFAST, Disp: 90 Tablet, Rfl: 3    pantoprazole (PROTONIX) 40 mg tablet, TAKE 1 TABLET DAILY, Disp: 90 Tablet, Rfl: 3    alendronate (FOSAMAX) 70 mg tablet, TAKE 1 TABLET EVERY 7 DAYS, Disp: 12 Tablet, Rfl: 3    atenoloL (TENORMIN) 25 mg tablet, TAKE 2 TABLETS DAILY, Disp: 180 Tablet, Rfl: 3    atorvastatin (LIPITOR) 40 mg tablet, TAKE 1 TABLET NIGHTLY FOR CHOLESTEROL, Disp: 90 Tablet, Rfl: 3    escitalopram oxalate (LEXAPRO) 5 mg tablet, Take 1 Tablet by mouth daily. , Disp: 90 Tablet, Rfl: 1    letrozole (FEMARA) 2.5 mg tablet, TAKE 1 TABLET DAILY, Disp: 90 Tablet, Rfl: 3    memantine (Namenda) 10 mg tablet, Take 1 Tablet by mouth two (2) times a day., Disp: 180 Tablet, Rfl: 2    hydroCHLOROthiazide (HYDRODIURIL) 12.5 mg tablet, TAKE 1 TABLET DAILY, Disp: 90 Tablet, Rfl: 3    coenzyme q10 10 mg cap, Take  by mouth., Disp: , Rfl:     ascorbic acid, vitamin C, (VITAMIN C) 500 mg tablet, Take  by mouth., Disp: , Rfl:     b complex-vitamin c-folic acid (NEPHROCAPS) 1 mg capsule, Take 1 Capsule by mouth daily. , Disp: , Rfl:     aspirin 81 mg chewable tablet, Take 81 mg by mouth daily. , Disp: , Rfl:     fexofenadine HCl (ALLEGRA PO), Take  by mouth. BID, Disp: , Rfl:     lutein 20 mg tab, Take 20 mg by mouth daily. , Disp: , Rfl:     cholecalciferol, vitamin d3, 400 unit cap, Take  by mouth., Disp: , Rfl:      Pertinent Neurological History:  Seizure: Never  Stroke: Never  TBI: Never     Neurodiagnostic Findings:  Imaging: CT/CTA head (1/13/2022) was interpreted as \"no acute process. No large vessel occlusion, arterial dissection, or flow-limiting stenosis. \"     Pertinent Labs:  Lab Date Result   TSH 9/23/2022 Within reference range   Vitamin B12 5/20/2022 Within reference range   T4, free 9/23/2022 Within reference range   Vitamin D 5/9/2022 Within reference range PSYCHOSOCIAL HISTORY:  Birth/Development: Born and raised in [place]  Language: [employer]  Education: 1 year of college  Academic Problems: Denied  Occupation:    Service: None  Relationship Status: . Patient was unable to recall when she got  or how long they have been . Children: 1 adult daughter  Housing: Private residence with   Legal: Denied. Substance Use:  Alcohol: Denied  Nicotine: Denied  Recreational/Illicit Substances: Denied  Treatment: Denied     BEHAVIORAL OBSERVATIONS:  Appearance: Casually dressed, Well-groomed, and Appeared stated age  Orientation: Reported the year to be 2021 but correctly identified the month and day of the week. Nearly identified the correct date. Not oriented to location or circumstance. Oriented to person  Motor: Ambulated independently, Adequate gait, Adequate posture, No involuntary movements, and Adequate strength  Thought Processes: Clear, Coherent, Intact, Logical, and Organized  Hearing and Vision: Adequate  Speech: shows no evidence of impairment  Comprehension: Adequate  Interpersonal Skills: Adequate  Affect: Appropriate  Ability as Historian: Inadequate. Unable to recall autobiographical information  Insight: Inadequate. Denies the presence of impairment  Judgment: Inadequate  Testing Behaviors: Rapport was adequately established between the patient and the examiner. The patient remained alert and focused throughout testing. They maintained a cooperative attitude and appeared to approach measures and a goal oriented fashion. TESTING  Measures administered by provider:  Test of Premorbid Functioning (TOPF) and Clock drawing test    Measures administered by technician:  Charly Hart; Wechsler Adult Intelligence Scale - Fourth Edition (WAIS-IV: [employer]); Trail Making Test A&B; Dinorah-Woods Executive Function System (D-KEFS: [employer] Comp[employer]); Modified [employer] (M-WCST);  Judgment of Line Orientation (Maggi Hidalgo); Dez Complex Figure Test - Copy (RCFT: Copy); Verbal Fluency (FAS & Animals); Neuropsychological Assessment Battery (NAB: Select Language Components); Taylor Verbal Learning Test - Revised (HVLT-R); Brief Visuospatial Memory Test - Revised (BVMT-R); Wechsler Memory Scale - Fourth Edition (WMS-IV: Select Components); Pillbox Test; Test of Practical Judgment (TOP-J: Form B); Winf[place] Sleepiness Scale (ESS); Tornillo Sleep Quality Inventory (PSQI); Geriatric Depression Scale - Short Form (GDS-SF); and Generalized Anxiety Disorder - 7 (TIMI-7). Results: For standardized tests, performance was compared to a demographically matched sample of neurocognitively healthy adults using the following classification system to indicate deviation from mean (or average) test performance:    Normally Distributed Not-Normally Distributed   Descriptor Percentile Descriptor Percentile   \"Exceptionally High\" >97th \"Within Normal Limits\" >24th   \"Above Average\" 91st - 97th \"Low Average\" 9th - 24th   \"High Average\" 75th - 90th \"Below Average\" 2nd - 8th   \"Average\" 25th - 74th \"Exceptionally Low\" <2nd   \"Low Average\" 9th - 24th    \"Below Average\" 2nd - 8th    \"Exceptionally Low\" <2nd      Performance and symptom validity are analyzed in a number of ways, including administration of neuropsychological measures that have been empirically shown to identify suboptimal performance or purposeful exaggeration. These tests and their scores have been redacted from this report in order to ensure test security, but are available to formally trained neuropsychologists upon request. In addition, when possible, the overall pattern of performance is analyzed for consistency between measures, consistency with the expected severity of impairment, and the presenting symptoms are compared against base rates of symptoms in other patients with similar problems.  The patient's performances were within acceptable limits, indicating the results of the present evaluation are believed to be valid and reliable. Premorbid Functioning:   Average  Attention:   Brief auditory attention: High average  Auditory working memory: Average to exceptionally low with the patient's exceptionally low performance related to perseverating on previously administered instructions  Processing Speed:  Exceptionally low to low average but generally in the below average range  Executive Functioning:   Mental set switching: Discontinued during training due to excessive errors  Problem Solving: Exceptionally low  Inhibition: Below average for speed and exceptionally low for accuracy  Inhibition/Switching: Discontinued during training due to excessive errors  Visuospatial:  Basic visuoperception: Low average  Clock drawing: Impaired drawing and orientation of hands. Otherwise within normal limits  Pattern reconstruction: Low average  Complex figure copy: Exceptionally low. While elements of the patient's reconstruction were recognizable, the overall gestalt was significantly impaired and disorganized.   Language:  Confrontation naming: Exceptionally low with limited benefit from cueing  Letter fluency: Below average  Semantic Fluency: Exceptionally low  Learning   List learning: Exceptionally low  Story learning: Below average  Basic figure learning: Exceptionally low  Progressively complex figure learning: Exceptionally low  Memory:  List recall: Exceptionally low  Story recall: Exceptionally low  Basic figure recall: Exceptionally low  Progressively complex figure recall: Exceptionally low  Recognition:  List recognition: Exceptionally low  Story recognition: Low average  Basic figure recognition: Exceptionally low  Progressively complex figure recognition: Below average  Functional:   Pillbox organization: Pass  Bill pay: Exceptionally low  Practical judgment: Exceptionally low  Psychiatric/Sleep:   Depression: Within normal limits  Anxiety: Mild  Daytime sleepiness: Within normal limits  Sleep quality: Within normal limits    TIME:  Clinical Interview: 09 - 1000 = 25 minutes  Testing and scoring by provider: 16 minutes  Testing by technician: 0082 - 7111 = 149 minutes  Scoring by technician: 34 minutes    BILLING:  58734 x 1 Unit  96136 x 1 Unit  96138 x 1 Unit  96139 x 5 Units

## 2023-03-15 ENCOUNTER — OFFICE VISIT (OUTPATIENT)
Dept: NEUROLOGY | Age: 78
End: 2023-03-15
Payer: MEDICARE

## 2023-03-15 DIAGNOSIS — G30.1 LATE ONSET ALZHEIMER'S DEMENTIA WITH ANXIETY, UNSPECIFIED DEMENTIA SEVERITY (HCC): Primary | ICD-10-CM

## 2023-03-15 DIAGNOSIS — F02.84 LATE ONSET ALZHEIMER'S DEMENTIA WITH ANXIETY, UNSPECIFIED DEMENTIA SEVERITY (HCC): Primary | ICD-10-CM

## 2023-03-15 PROCEDURE — 96133 NRPSYC TST EVAL PHYS/QHP EA: CPT | Performed by: CLINICAL NEUROPSYCHOLOGIST

## 2023-03-15 PROCEDURE — 96132 NRPSYC TST EVAL PHYS/QHP 1ST: CPT | Performed by: CLINICAL NEUROPSYCHOLOGIST

## 2023-03-15 NOTE — PROGRESS NOTES
Prior to seeing the patient I reviewed pertinent records, including the previously completed report, the records in [place], and any updated visits from other providers since the patient's last visit. Today, I engaged in a psychoeducational and supportive feedback session with the patient. I provided psychotherapy in the form of psychoeducation and support with respect to the results of the recent Neuropsychological Evaluation, including discussing individual tests as well as patient's areas of neurocognitive strength versus weakness. We discussed, in detail, the following:  Testing revealed moderate to advanced impairment across several domains of cognitive functioning most notable for an amnestic memory profile  Due to the degree of impairment and reported declines in daily functioning, the patient meets criteria for the diagnosis of dementia. Etiology of dementia suspected to be Alzheimer's disease  Reviewed recommendations outlined in report  Answered questions to the best of my ability    Education was provided regarding my diagnostic impressions, and we discussed treatment plan/options. I also answered numerous questions related to the clinical findings, including discussing various methods to improve cognition and mood. Supportive/Cognitive Behavioral/Solution Focused psychotherapy provided. The patient needs to:    Follow-up with referring provider for ongoing management  Discontinue driving until an evaluation can be completed  Emphasize modifiable protective behaviors for cognitive functioning (e.g., exercise, diet, and cognitive stimulation)  Contact community resources including the Alzheimer's Association      TIME:  Clinical Interview: 09 - 1000 = 25 minutes  Testing and scoring by provider: 16 minutes  Testing by technician: 3490 - 4678 = 146 minutes  Scoring by technician: 34 minutes  Neuropsychological testing evaluation services*: 154 minutes + 20 minutes feedback = 174 minutes total    BILLING:  69620 x 1 Unit  96136 x 1 Unit  96138 x 1 Unit  96139 x 5 Units  96132 x 1 Unit  96133 x 2 Units    *Neuropsychological testing evaluation services include: Integration of patient data, interpretation of standardized test results and clinical data, clinical decision-making, treatment planning and report, and interactive feedback to the patient, family member(s) or caregiver(s), when performed.

## 2023-03-16 ENCOUNTER — OFFICE VISIT (OUTPATIENT)
Dept: NEUROLOGY | Age: 78
End: 2023-03-16
Payer: MEDICARE

## 2023-03-16 VITALS
WEIGHT: 130 LBS | BODY MASS INDEX: 24.55 KG/M2 | TEMPERATURE: 97.7 F | OXYGEN SATURATION: 98 % | HEART RATE: 60 BPM | RESPIRATION RATE: 16 BRPM | HEIGHT: 61 IN | SYSTOLIC BLOOD PRESSURE: 150 MMHG | DIASTOLIC BLOOD PRESSURE: 90 MMHG

## 2023-03-16 DIAGNOSIS — F02.80 DEMENTIA DUE TO ALZHEIMER'S DISEASE (HCC): Primary | ICD-10-CM

## 2023-03-16 DIAGNOSIS — G30.9 DEMENTIA DUE TO ALZHEIMER'S DISEASE (HCC): Primary | ICD-10-CM

## 2023-03-16 DIAGNOSIS — F41.9 ANXIETY: ICD-10-CM

## 2023-03-16 PROCEDURE — G8427 DOCREV CUR MEDS BY ELIG CLIN: HCPCS | Performed by: NURSE PRACTITIONER

## 2023-03-16 PROCEDURE — G8432 DEP SCR NOT DOC, RNG: HCPCS | Performed by: NURSE PRACTITIONER

## 2023-03-16 PROCEDURE — 99213 OFFICE O/P EST LOW 20 MIN: CPT | Performed by: NURSE PRACTITIONER

## 2023-03-16 PROCEDURE — 1090F PRES/ABSN URINE INCON ASSESS: CPT | Performed by: NURSE PRACTITIONER

## 2023-03-16 PROCEDURE — 3017F COLORECTAL CA SCREEN DOC REV: CPT | Performed by: NURSE PRACTITIONER

## 2023-03-16 PROCEDURE — G8420 CALC BMI NORM PARAMETERS: HCPCS | Performed by: NURSE PRACTITIONER

## 2023-03-16 PROCEDURE — 3077F SYST BP >= 140 MM HG: CPT | Performed by: NURSE PRACTITIONER

## 2023-03-16 PROCEDURE — 1101F PT FALLS ASSESS-DOCD LE1/YR: CPT | Performed by: NURSE PRACTITIONER

## 2023-03-16 PROCEDURE — 3080F DIAST BP >= 90 MM HG: CPT | Performed by: NURSE PRACTITIONER

## 2023-03-16 PROCEDURE — G8536 NO DOC ELDER MAL SCRN: HCPCS | Performed by: NURSE PRACTITIONER

## 2023-03-16 PROCEDURE — 1123F ACP DISCUSS/DSCN MKR DOCD: CPT | Performed by: NURSE PRACTITIONER

## 2023-03-16 NOTE — PROGRESS NOTES
Chief Complaint   Patient presents with    Follow-up      memory concerns     1. Have you been to the ER, urgent care clinic since your last visit? No Hospitalized since your last visit? No     2. Have you seen or consulted any other health care providers outside of the [address] [place] [address] since your last visit? Yes Eyes exam  Include any pap smears or colon screening.  No     Review Dr Millan Gip encounter

## 2023-03-16 NOTE — PROGRESS NOTES
[facility]  [address] [facility] [address] [place] [address]  Jeannie Sanchez  Tel: [phone]  Fax: [phone]      Date:  23     Name:  Aníbal Malik  :  1945  MRN:  [id number]     PCP:  Chris Esteves MD    Chief Complaint   Patient presents with    Follow-up      memory concerns       HISTORY OF PRESENT ILLNESS:  Patient presents today for regular follow up, last seen 2022, for memory loss. Neuropsych testing: Dementia due to AD. No Driving. OT referral placed yesterday at [address] [place] [address]. Namenda 10mg BID: taking it as rx'ed, per her spouse it seems to be helping. Also taking Balance of Nature supplement, cognium supplement for her memory as well. Tasks: laundry, cleaning, etc. Doing  better with putting away silver ware, washing dishes,etc.  If you start talking to her, she doesn't catch the first 3-4 words of the conversation, so the person has to repeat herself. Possibly worse. The Namenda dosage was increased at last visit, she appears to be tolerating it well and seems to be helping. No unsafe behaviors. Sleeps well. Watches tv, no regular exercise. Housework. No falls. Eats a lot of snacks, no trouble swallowing. Drinks plenty of fluids. Pt doesn't feel like her memory is too bad until she is reminded by a family member. Her spouse notes that he has some cognitive decline as well, they are planning on working with an . Recap from last visit 1. Memory loss: As patient is tolerating the Namenda 10mg nightly, we will go ahead and increase her dosage to Namenda 10mg BID, side effects discussed with patient/. Healthy lifestyle encouraged. Pt is scheduled for Neuropsych testing 3/2023, can modify plan of care based on results of testing. Pt/ to notify us if any changes or worsening of memory, cognition, safety concerns etc.   -     memantine (Namenda) 10 mg tablet;  Take 1 Tablet by mouth two (2) times a day., Normal, Disp-180 Tablet, R-2  2. MCI (mild cognitive impairment): Increase made to the Namenda to 10mg BID. Encouraged healthy lifestyle, good sleep wake cycle, various activities encouraged to help maintain brain function  3. Anxiety: Continue Medications as rx'ed by PCP, pt notes family member is a counselor, continue to speak with her. Decrease stressors, encouraged pt and  to resume walking for exercise. REVIEW OF SYSTEMS:     Review of Systems   Musculoskeletal:  Negative for falls. Neurological:  Negative for dizziness, tingling, tremors, sensory change and headaches. Psychiatric/Behavioral:  Positive for memory loss. Negative for depression and hallucinations. The patient is nervous/anxious. The patient does not have insomnia. All other systems reviewed and are negative. Current Outpatient Medications   Medication Sig    vit C/E/Zn/coppr/lutein/zeaxan (PRESERVISION AREDS-2 PO) Take  by mouth two (2) times a day. amLODIPine (NORVASC) 2.5 mg tablet Take 1 Tablet by mouth daily. levothyroxine (SYNTHROID) 100 mcg tablet TAKE 1 TABLET DAILY BEFORE BREAKFAST    pantoprazole (PROTONIX) 40 mg tablet TAKE 1 TABLET DAILY    alendronate (FOSAMAX) 70 mg tablet TAKE 1 TABLET EVERY 7 DAYS    atenoloL (TENORMIN) 25 mg tablet TAKE 2 TABLETS DAILY    atorvastatin (LIPITOR) 40 mg tablet TAKE 1 TABLET NIGHTLY FOR CHOLESTEROL    escitalopram oxalate (LEXAPRO) 5 mg tablet Take 1 Tablet by mouth daily. letrozole (FEMARA) 2.5 mg tablet TAKE 1 TABLET DAILY    memantine (Namenda) 10 mg tablet Take 1 Tablet by mouth two (2) times a day. hydroCHLOROthiazide (HYDRODIURIL) 12.5 mg tablet TAKE 1 TABLET DAILY    coenzyme q10 10 mg cap Take  by mouth. ascorbic acid, vitamin C, (VITAMIN C) 500 mg tablet Take  by mouth.    b complex-vitamin c-folic acid (NEPHROCAPS) 1 mg capsule Take 1 Capsule by mouth daily. aspirin 81 mg chewable tablet Take 81 mg by mouth daily. fexofenadine HCl (ALLEGRA PO) Take  by mouth.  BID cholecalciferol, vitamin d3, 400 unit cap Take  by mouth. No current facility-administered medications for this visit. Allergies   Allergen Reactions    Aricept [Donepezil] Diarrhea    Gabapentin Diarrhea     Pt had severe diarrhea for 2 days after starting      Pneumococcal 23-Roxanne Ps Vaccine Swelling     Past Medical History:   Diagnosis Date    Anxiety     Arrhythmia     Arthritis     Breast cancer (HCC)     right side    GERD (gastroesophageal reflux disease)     High cholesterol     HTN (hypertension)     Hypothyroid     Memory deficit     Menopause     Radiation therapy complication     Shingles outbreak      Past Surgical History:   Procedure Laterality Date    COLONOSCOPY N/A 06/07/2019    COLONOSCOPY performed by Su Hermosillo MD at [facility] ENDOSCOPY    HX BREAST LUMPECTOMY Right 12/16/2021    RIGHT BREAST LUMPECTOMY WITH ULTRASOUND AND RIGHT BREAST SENTINEL NODE BIOPSY performed by Rony Vera MD at [facility] AMBULATORY OR    HX GYN      hysterectomy    HX GYN      vaginal birth x 1    HX HYSTERECTOMY       Social History     Socioeconomic History    Marital status:      Spouse name: Not on file    Number of children: Not on file    Years of education: Not on file    Highest education level: Not on file   Occupational History    Not on file   Tobacco Use    Smoking status: Never    Smokeless tobacco: Never   Vaping Use    Vaping Use: Never used   Substance and Sexual Activity    Alcohol use: No    Drug use: Never    Sexual activity: Yes     Partners: Male   Other Topics Concern     Service Not Asked    Blood Transfusions Not Asked    Caffeine Concern Not Asked    Occupational Exposure Not Asked    Hobby Hazards Not Asked    Sleep Concern Not Asked    Stress Concern Not Asked    Weight Concern Not Asked    Special Diet Not Asked    Back Care Not Asked    Exercise Not Asked    Bike Helmet Not Asked    Seat Belt Not Asked    Self-Exams Not Asked   Social History Narrative    . Retired. Social Determinants of Health     Financial Resource Strain: Not on file   Food Insecurity: Not on file   Transportation Needs: Not on file   Physical Activity: Not on file   Stress: Not on file   Social Connections: Not on file   Intimate Partner Violence: Not on file   Housing Stability: Not on file     Family History   Problem Relation Age of Onset    Hypertension Sister     Stroke Mother     Cancer Father          PHYSICAL EXAMINATION:    Visit Vitals  BP (!) 150/90 (BP 1 Location: Left upper arm, BP Patient Position: Sitting, BP Cuff Size: Adult)   Pulse 60   Temp 97.7 °F (36.5 °C) (Temporal)   Resp 16   Ht 5' 1\" (1.549 m)   Wt 130 lb (59 kg)   SpO2 98%   BMI 24.56 kg/m²       General:  Well defined, nourished, and well groomed individual in no acute distress. Neck: Supple, nontender, normal range of motion. Musculoskeletal:  Extremities revealed no edema and had full range of motion of joints. Psych:  Good mood and bright affect with her spouse. NEUROLOGICAL EXAMINATION:     Mental Status:   Alert and oriented to person, place, and time with recent and remote memory intact. Attention span and concentration are normal. Clear speech. Fund of knowledge preserved. Clock Test: did well except the placement of the hour and minute. Word Recall:  0/3. Pt had trouble spelling world correctly backwards. Cranial Nerves:   PERRLA. Visual fields were full  EOM: no evidence of nystagmus  Facial sensation:  normal and symmetric  Facial motor: normal and symmetric, no facial droop noted. Hearing intact  SCM strength intact  Tongue: midline without fasciculations    Motor Examination: Normal tone. 5/5 muscle strength in bilateral upper extremities. No cogwheel rigidity. No muscle wasting, no twitching or fasciculation noted. Sensory exam:  Normal throughout to light touch in BUE. Coordination:   Finger to nose and rapid arm movement testing was normal.  No resting or intention tremor. Negative Romberg, negative pronator drift. Gait and Station:  Steady, did well with tandem walking. Normal arm swing. Reflexes:  DTRs 2+ in bilateral biceps, brachioradialis, patella. ASSESSMENT AND PLAN      ICD-10-CM ICD-9-CM    1. Dementia due to Alzheimer's disease ([facility] [address]  G30.9 331.0     F02.80 294.10       2. Anxiety  F41.9 300.00         1. Dementia due to Alzheimer's disease [facility]): Patient is continue Namenda 10 mg twice a day, she has completed neuropsych testing, this was recently  reviewed by Dr. Yeison Owusu, OT evaluation has been ordered in regards to driving. Discussed benefits of maintaining a healthy lifestyle as well as different cognitive activities. They are to contact us for any major changes or worsening signs or symptoms. 2. Anxiety: She is to continue medications as prescribed, discussed different ways to help manage stressors and anxiety. Healthy lifestyle encouraged, to include good sleep-wake cycle. Patient and/or family verbalized understand of all instructions and all questions/concerns were addressed. Safety/side effects of medications discussed. Patient remains a complex patient secondary to polypharmacy, significant comorbid conditions, and use of high-risk medications which complicate the decision making process related to patient's neurologic diagnosis. We will see the patient back in  6   months, sooner if needed.       RUBEN Steel-BC

## 2023-03-23 ENCOUNTER — VIRTUAL VISIT (OUTPATIENT)
Dept: FAMILY MEDICINE CLINIC | Age: 78
End: 2023-03-23
Payer: MEDICARE

## 2023-03-23 DIAGNOSIS — R50.9 FEBRILE ILLNESS: Primary | ICD-10-CM

## 2023-03-23 PROCEDURE — 1101F PT FALLS ASSESS-DOCD LE1/YR: CPT | Performed by: FAMILY MEDICINE

## 2023-03-23 PROCEDURE — 99443 PR PHYS/QHP TELEPHONE EVALUATION 21-30 MIN: CPT | Performed by: FAMILY MEDICINE

## 2023-03-23 RX ORDER — DOXYCYCLINE 100 MG/1
100 TABLET ORAL 2 TIMES DAILY
Qty: 20 TABLET | Refills: 0 | Status: SHIPPED | OUTPATIENT
Start: 2023-03-23 | End: 2023-04-02

## 2023-03-23 NOTE — PROGRESS NOTES
Mp Corona is a 78 y.o. female evaluated via audio only technology on 3/23/2023. I communicated with the patient and/or health care decision maker about the nature and details of the following:  Assessment & Plan:   Febrile illness  Unfortunately nonfocal  Hemodynamically stable although blood pressure is lower than usual  Not tachycardic although beta-blocker by atenolol    Does not currently feel up to coming in to the clinic. I offered to order some blood work, urine, flu swab to help us narrow this down.  plans to try to get some fluids at her and see if she feels up to doing this this afternoon. I emphasized the importance of hydration. A lot of her symptoms at the moment are due to dehydration. She needs to be able to tolerate oral fluids or she will have to go to the emergency room for IV fluids. You should have a very low threshold for doing this i.e Later today or tomorrow if unable to tolerate fluids    Hold hydrochlorothiazide this morning  Hold atenolol this morning  Do not resume these blood pressure medicines until she is consistently keeping up with her fluid intake  Use Gatorade/Powerade or other sports drink for rehydration/electrolytes  She typically drinks 16-24 ounces in a day. Add an additional 16 ounces to that for rehydration purposes    Tylenol for fever    Empiric doxycycline for now    12  Subjective:   Mp Corona is a 78 y.o. female who was seen for Lethargy, Dizziness, and Abnormal Lab Results (K+)      She has been feeling poorly for the last 2 days. Yesterday did not feel well, sat around, scrolled on her phone, a lot less activity than usual.  Complained of a headache last night and received some Tylenol. Did not drink much fluids yesterday. Did not eat very much at all yesterday. This morning was pale, feeling weak. Felt dizzy with standing. Temperature 101.6. A nurse friend came over and got vital signs. 112/57 lying down, 108/54 sitting up. Pulse 88. Blood sugar 161. She has aching around the cheeks and aching in the thighs currently. Denies cough congestion shortness of breath chest pain postnasal drip, ear pain, urinary pain. Has not received anything for today's fever. Has not taken her medicine yet this morning. Has not had anything to eat or drink. Has not urinated yet this morning. Prior to Admission medications    Medication Sig Start Date End Date Taking? Authorizing Provider   vit C/E/Zn/coppr/lutein/zeaxan (PRESERVISION AREDS-2 PO) Take  by mouth two (2) times a day. Yes Provider, Historical   amLODIPine (NORVASC) 2.5 mg tablet Take 1 Tablet by mouth daily. 3/1/23  Yes Justin Bingham MD   levothyroxine (SYNTHROID) 100 mcg tablet TAKE 1 TABLET DAILY BEFORE BREAKFAST 2/6/23  Yes Brittanie Floyd NP   pantoprazole (PROTONIX) 40 mg tablet TAKE 1 TABLET DAILY 1/30/23  Yes Justin Bingham MD   alendronate (FOSAMAX) 70 mg tablet TAKE 1 TABLET EVERY 7 DAYS 1/23/23  Yes Justin Bingham MD   atenoloL (TENORMIN) 25 mg tablet TAKE 2 TABLETS DAILY 11/23/22  Yes Justin Bingham MD   atorvastatin (LIPITOR) 40 mg tablet TAKE 1 TABLET NIGHTLY FOR CHOLESTEROL 11/14/22  Yes Justin Bingham MD   escitalopram oxalate (LEXAPRO) 5 mg tablet Take 1 Tablet by mouth daily. 9/16/22  Yes Brittanie Floyd NP   letrozole (FEMARA) 2.5 mg tablet TAKE 1 TABLET DAILY 9/14/22  Yes Emily Nogueira NP   memantine (Namenda) 10 mg tablet Take 1 Tablet by mouth two (2) times a day. 8/25/22  Yes Diamond Hanley NP   hydroCHLOROthiazide (HYDRODIURIL) 12.5 mg tablet TAKE 1 TABLET DAILY 6/24/22  Yes Brittanie Floyd NP   coenzyme q10 10 mg cap Take  by mouth. Yes Provider, Historical   ascorbic acid, vitamin C, (VITAMIN C) 500 mg tablet Take  by mouth. Yes Provider, Historical   b complex-vitamin c-folic acid (NEPHROCAPS) 1 mg capsule Take 1 Capsule by mouth daily. Yes Provider, Historical   aspirin 81 mg chewable tablet Take 81 mg by mouth daily.    Yes Provider, Historical   fexofenadine HCl (ALLEGRA PO) Take  by mouth. BID   Yes Provider, Historical   cholecalciferol, vitamin d3, 400 unit cap Take  by mouth. Yes Provider, Historical     Allergies   Allergen Reactions    Aricept [Donepezil] Diarrhea    Gabapentin Diarrhea     Pt had severe diarrhea for 2 days after starting      Pneumococcal 23-Roxanne Ps Vaccine Swelling         Chelo Santos was evaluated through a patient-initiated, synchronous (real-time) audio only encounter. She (or guardian if applicable) is aware that it is a billable service, which includes applicable co-pays, with coverage as determined by her insurance carrier. This visit was conducted with the patient's (and/or Kylah Breaux guardian's) verbal consent. She has not had a related appointment within my department in the past 7 days or scheduled within the next 24 hours. The patient was located in a state where the provider was licensed to provide care.   The patient was located at: [place]  The provider was located at: [place]    Total Time: minutes: 21-30 minutes    Avi Lesch, MD

## 2023-03-23 NOTE — PROGRESS NOTES
Health Maintenance Due   Topic Date Due    DTaP/Tdap/Td series (1 - Tdap) Never done    COVID-19 Vaccine (4 - Booster for Pf[employer] series) 12/01/2021     1. \"Have you been to the ER, urgent care clinic since your last visit? Hospitalized since your last visit? \"  Yes. ED    2. \"Have you seen or consulted any other health care providers outside of the [address] [place] [address] since your last visit? \" No     3. For patients aged 39-70: Has the patient had a colonoscopy / FIT/ Cologuard? Yes - Care Gap present. Most recent result on file      If the patient is female:    4. For patients aged 41-77: Has the patient had a mammogram within the past 2 years? NA - based on age or sex      11. For patients aged 21-65: Has the patient had a pap smear?  NA - based on age or sex

## 2023-03-24 ENCOUNTER — CLINICAL SUPPORT (OUTPATIENT)
Dept: FAMILY MEDICINE CLINIC | Age: 78
End: 2023-03-24

## 2023-03-24 VITALS — TEMPERATURE: 97.9 F

## 2023-03-24 DIAGNOSIS — R50.9 FEBRILE ILLNESS: ICD-10-CM

## 2023-03-25 LAB
ALBUMIN SERPL-MCNC: 3.4 G/DL (ref 3.5–5)
ALBUMIN/GLOB SERPL: 1 (ref 1.1–2.2)
ALP SERPL-CCNC: 74 U/L (ref 45–117)
ALT SERPL-CCNC: 18 U/L (ref 12–78)
ANION GAP SERPL CALC-SCNC: 5 MMOL/L (ref 5–15)
AST SERPL-CCNC: 21 U/L (ref 15–37)
BASOPHILS # BLD: 0 K/UL (ref 0–0.1)
BASOPHILS NFR BLD: 0 % (ref 0–1)
BILIRUB SERPL-MCNC: 0.9 MG/DL (ref 0.2–1)
BUN SERPL-MCNC: 14 MG/DL (ref 6–20)
BUN/CREAT SERPL: 16 (ref 12–20)
CALCIUM SERPL-MCNC: 9.1 MG/DL (ref 8.5–10.1)
CHLORIDE SERPL-SCNC: 97 MMOL/L (ref 97–108)
CO2 SERPL-SCNC: 30 MMOL/L (ref 21–32)
CREAT SERPL-MCNC: 0.87 MG/DL (ref 0.55–1.02)
DIFFERENTIAL METHOD BLD: ABNORMAL
EOSINOPHIL # BLD: 0 K/UL (ref 0–0.4)
EOSINOPHIL NFR BLD: 0 % (ref 0–7)
ERYTHROCYTE [DISTWIDTH] IN BLOOD BY AUTOMATED COUNT: 12.8 % (ref 11.5–14.5)
GLOBULIN SER CALC-MCNC: 3.5 G/DL (ref 2–4)
GLUCOSE SERPL-MCNC: 111 MG/DL (ref 65–100)
HCT VFR BLD AUTO: 38.4 % (ref 35–47)
HGB BLD-MCNC: 12.2 G/DL (ref 11.5–16)
IMM GRANULOCYTES # BLD AUTO: 0 K/UL (ref 0–0.04)
IMM GRANULOCYTES NFR BLD AUTO: 0 % (ref 0–0.5)
LYMPHOCYTES # BLD: 0.5 K/UL (ref 0.8–3.5)
LYMPHOCYTES NFR BLD: 4 % (ref 12–49)
MCH RBC QN AUTO: 30.1 PG (ref 26–34)
MCHC RBC AUTO-ENTMCNC: 31.8 G/DL (ref 30–36.5)
MCV RBC AUTO: 94.8 FL (ref 80–99)
MONOCYTES # BLD: 0.7 K/UL (ref 0–1)
MONOCYTES NFR BLD: 6 % (ref 5–13)
NEUTS SEG # BLD: 10.6 K/UL (ref 1.8–8)
NEUTS SEG NFR BLD: 90 % (ref 32–75)
NRBC # BLD: 0 K/UL (ref 0–0.01)
NRBC BLD-RTO: 0 PER 100 WBC
PLATELET # BLD AUTO: 202 K/UL (ref 150–400)
PMV BLD AUTO: 11.2 FL (ref 8.9–12.9)
POTASSIUM SERPL-SCNC: 3.8 MMOL/L (ref 3.5–5.1)
PROT SERPL-MCNC: 6.9 G/DL (ref 6.4–8.2)
RBC # BLD AUTO: 4.05 M/UL (ref 3.8–5.2)
RBC MORPH BLD: ABNORMAL
SODIUM SERPL-SCNC: 132 MMOL/L (ref 136–145)
WBC # BLD AUTO: 11.8 K/UL (ref 3.6–11)

## 2023-03-29 ENCOUNTER — TELEPHONE (OUTPATIENT)
Dept: FAMILY MEDICINE CLINIC | Age: 78
End: 2023-03-29

## 2023-03-29 NOTE — TELEPHONE ENCOUNTER
Check on patient. She was sick last week.   Based on blood work she may have been fighting a bacterial infection so I hope the antibiotic was helpful    Everything else in the blood work looked fine

## 2023-03-31 ENCOUNTER — OFFICE VISIT (OUTPATIENT)
Dept: FAMILY MEDICINE CLINIC | Age: 78
End: 2023-03-31

## 2023-03-31 VITALS
HEART RATE: 72 BPM | SYSTOLIC BLOOD PRESSURE: 121 MMHG | TEMPERATURE: 97 F | WEIGHT: 130 LBS | HEIGHT: 61 IN | OXYGEN SATURATION: 94 % | RESPIRATION RATE: 18 BRPM | BODY MASS INDEX: 24.55 KG/M2 | DIASTOLIC BLOOD PRESSURE: 76 MMHG

## 2023-03-31 DIAGNOSIS — G30.1 LATE ONSET ALZHEIMER'S DEMENTIA WITH ANXIETY, UNSPECIFIED DEMENTIA SEVERITY (HCC): ICD-10-CM

## 2023-03-31 DIAGNOSIS — F02.84 LATE ONSET ALZHEIMER'S DEMENTIA WITH ANXIETY, UNSPECIFIED DEMENTIA SEVERITY (HCC): ICD-10-CM

## 2023-03-31 DIAGNOSIS — I10 PRIMARY HYPERTENSION: Primary | ICD-10-CM

## 2023-03-31 NOTE — PROGRESS NOTES
Room: 2  Identified pt with two pt identifiers(name and ). Reviewed record in preparation for visit and have obtained necessary documentation. Chief Complaint   Patient presents with    Follow-up    Hypertension        Vitals:    23 1505   BP: (!) 146/81   Pulse: 72   Resp: 18   Temp: 97 °F (36.1 °C)   TempSrc: Temporal   SpO2: 94%   Weight: 130 lb (59 kg)   Height: 5' 1\" (1.549 m)   PainSc:   0 - No pain   LMP: 1996       Health Maintenance Due   Topic    DTaP/Tdap/Td series (1 - Tdap)    COVID-19 Vaccine (4 - Booster for [employer] series)       1. \"Have you been to the ER, urgent care clinic since your last visit? Hospitalized since your last visit? \" No    2. \"Have you seen or consulted any other health care providers outside of the [address] [place] [address] since your last visit? \" No     3. For patients over 45: Has the patient had a colonoscopy? NA - based on age     If the patient is female:    4. For patients over 40: Has the patient had a mammogram? Yes - no Care Gap present    5. For patients over 21: Has the patient had a pap smear?  NA - based on age    Current Outpatient Medications   Medication Instructions    alendronate (FOSAMAX) 70 mg tablet TAKE 1 TABLET EVERY 7 DAYS    amLODIPine (NORVASC) 2.5 mg, Oral, DAILY    ascorbic acid, vitamin C, (VITAMIN C) 500 mg tablet Oral    aspirin 81 mg, Oral, DAILY    atenoloL (TENORMIN) 25 mg tablet TAKE 2 TABLETS DAILY    atorvastatin (LIPITOR) 40 mg tablet TAKE 1 TABLET NIGHTLY FOR CHOLESTEROL    b complex-vitamin c-folic acid (NEPHROCAPS) 1 mg capsule 1 Capsule, Oral, DAILY    cholecalciferol, vitamin d3, 400 unit cap Take  by mouth.    coenzyme q10 10 mg cap Oral    doxycycline (ADOXA) 100 mg, Oral, 2 TIMES DAILY    escitalopram oxalate (LEXAPRO) 5 mg, Oral, DAILY    fexofenadine HCl (ALLEGRA PO) Oral, BID     hydroCHLOROthiazide (HYDRODIURIL) 12.5 mg tablet TAKE 1 TABLET DAILY    letrozole (FEMARA) 2.5 mg tablet TAKE 1 TABLET DAILY    levothyroxine (SYNTHROID) 100 mcg tablet TAKE 1 TABLET DAILY BEFORE BREAKFAST    memantine (NAMENDA) 10 mg, Oral, 2 TIMES DAILY    pantoprazole (PROTONIX) 40 mg tablet TAKE 1 TABLET DAILY    vit C/E/Zn/coppr/lutein/zeaxan (PRESERVISION AREDS-2 PO) Oral, 2 TIMES DAILY       Allergies   Allergen Reactions    Aricept [Donepezil] Diarrhea    Gabapentin Diarrhea     Pt had severe diarrhea for 2 days after starting      Pneumococcal 23-Roxanne Ps Vaccine Swelling       Immunization History   Administered Date(s) Administered    COVID-19, PFIZER PURPLE top, DILUTE for use, (age 15 y+), IM, 30mcg/0.3mL 03/06/2021, 03/27/2021, 10/06/2021    Influenza High Dose Vaccine PF 10/13/2014, 10/07/2016, 10/27/2017, 08/25/2020    Influenza Vaccine (Tri) Adjuvanted (>65 Yrs FLUAD TRI 78577) 10/18/2019    Influenza, FLUAD, (age 72 y+), Adjuvanted 09/21/2021, 09/23/2022    Pneumococcal Conjugate (PCV-13) 03/22/2019    Zoster Recombinant 04/25/2019, 07/18/2019       Past Medical History:   Diagnosis Date    Anxiety     Arrhythmia     Arthritis     Breast cancer ([facility] [address]     right side    GERD (gastroesophageal reflux disease)     High cholesterol     HTN (hypertension)     Hypothyroid     Memory deficit     Menopause     Radiation therapy complication     Shingles outbreak

## 2023-03-31 NOTE — PROGRESS NOTES
ELBA Blanchard Monday is a 78 y.o. female who presents to follow-up on febrile illness that she had last week. Recall that I saw her on Thursday 3/23 for feeling poorly prior 2 days. Had a headache, was feeling pale and weak. Dizzy with standing. Temperature 101.6. She was treated empirically with doxycycline and told to hold her atenolol and HCTZ until she felt better. She started feeling better in about 3 days and restarted her atenolol and HCTZ on Monday. Today she is feeling back up to 100% and her blood pressure is excellent. Spent the bulk of the visit discussing her diagnosis of Alzheimer's. Saw neuropsych and subsequently neurology. She is tolerating Namenda    PMHx:  Past Medical History:   Diagnosis Date    Anxiety     Arrhythmia     Arthritis     Breast cancer ([facility] [address]     right side    GERD (gastroesophageal reflux disease)     High cholesterol     HTN (hypertension)     Hypothyroid     Memory deficit     Menopause     Radiation therapy complication     Shingles outbreak        Meds:   Current Outpatient Medications   Medication Sig Dispense Refill    vit C/E/Zn/coppr/lutein/zeaxan (PRESERVISION AREDS-2 PO) Take  by mouth two (2) times a day. amLODIPine (NORVASC) 2.5 mg tablet Take 1 Tablet by mouth daily. 90 Tablet 1    levothyroxine (SYNTHROID) 100 mcg tablet TAKE 1 TABLET DAILY BEFORE BREAKFAST 90 Tablet 3    pantoprazole (PROTONIX) 40 mg tablet TAKE 1 TABLET DAILY 90 Tablet 3    alendronate (FOSAMAX) 70 mg tablet TAKE 1 TABLET EVERY 7 DAYS 12 Tablet 3    atenoloL (TENORMIN) 25 mg tablet TAKE 2 TABLETS DAILY 180 Tablet 3    atorvastatin (LIPITOR) 40 mg tablet TAKE 1 TABLET NIGHTLY FOR CHOLESTEROL 90 Tablet 3    escitalopram oxalate (LEXAPRO) 5 mg tablet Take 1 Tablet by mouth daily. 90 Tablet 1    letrozole (FEMARA) 2.5 mg tablet TAKE 1 TABLET DAILY 90 Tablet 3    memantine (Namenda) 10 mg tablet Take 1 Tablet by mouth two (2) times a day.  180 Tablet 2    hydroCHLOROthiazide (HYDRODIURIL) 12.5 mg tablet TAKE 1 TABLET DAILY 90 Tablet 3    coenzyme q10 10 mg cap Take  by mouth. ascorbic acid, vitamin C, (VITAMIN C) 500 mg tablet Take  by mouth.      b complex-vitamin c-folic acid (NEPHROCAPS) 1 mg capsule Take 1 Capsule by mouth daily. aspirin 81 mg chewable tablet Take 81 mg by mouth daily. fexofenadine HCl (ALLEGRA PO) Take  by mouth. BID      cholecalciferol, vitamin d3, 400 unit cap Take  by mouth. doxycycline (ADOXA) 100 mg tablet Take 1 Tablet by mouth two (2) times a day for 10 days. 20 Tablet 0       Allergies: Allergies   Allergen Reactions    Aricept [Donepezil] Diarrhea    Gabapentin Diarrhea     Pt had severe diarrhea for 2 days after starting      Pneumococcal 23-Roxanne Ps Vaccine Swelling       Smoker:  Social History     Tobacco Use   Smoking Status Never   Smokeless Tobacco Never       ETOH:   Social History     Substance and Sexual Activity   Alcohol Use No       FH:   Family History   Problem Relation Age of Onset    Hypertension Sister     Stroke Mother     Cancer Father        ROS:   As listed in HPI. In addition:  Constitutional:   No headache, fever, fatigue, weight loss or weight gain      Cardiac:    No chest pain      Resp:   No cough or shortness of breath      Neuro   No loss of consciousness, dizziness, seizures      Physical Exam:  Blood pressure 121/76, pulse 72, temperature 97 °F (36.1 °C), temperature source Temporal, resp. rate 18, height 5' 1\" (1.549 m), weight 130 lb (59 kg), last menstrual period 01/06/1996, SpO2 94 %. GEN: No apparent distress. Alert and oriented and responds to all questions appropriately. NEUROLOGIC:  No focal neurologic deficits. Strength and sensation grossly intact. Coordination and gait grossly intact. EXT: Well perfused. No edema. SKIN: No obvious rashes.   Lungs clear to auscultation bilaterally  CV regular rate rhythm no murmur  HEENT clear tympanic membrane clear nose mucosa clear oral mucosa Assessment and Plan     Febrile illness  Unfortunately nonfocal  Lab suggests that she may have been fighting a bacterial infection. Responded to empiric doxycycline as would be expected with a bacterial infection. Still not clear exactly what she had. Hypertension  Has restarted all of her blood pressure medicines and her blood pressure is excellent today. Dementia  New diagnosis 2023 neuropsych testing  Tolerating Namenda  Will be following with neurology  Has been referred to driving evaluation      ICD-10-CM ICD-9-CM    1. Primary hypertension  I10 401.9       2. Late onset Alzheimer's dementia with anxiety, unspecified dementia severity ([facility] [address]  G30.1 331.0     F02.84 294.11           AVS given.  Pt expressed understanding of instructions

## 2023-04-22 DIAGNOSIS — Z85.3 HISTORY OF BREAST CANCER IN FEMALE: Primary | ICD-10-CM

## 2023-05-09 RX ORDER — MEMANTINE HYDROCHLORIDE 10 MG/1
TABLET ORAL
Qty: 180 TABLET | Refills: 3 | Status: SHIPPED | OUTPATIENT
Start: 2023-05-09

## 2023-05-09 NOTE — TELEPHONE ENCOUNTER
Requested Prescriptions     Pending Prescriptions Disp Refills    memantine (NAMENDA) 10 MG tablet [Pharmacy Med Name: MEMANTINE HCL TABS 10MG] 180 tablet 3     Sig: TAKE 1 TABLET TWICE A DAY    Last fill 8/25/22  Last visit 3/16/23  Next visit 10/03/23

## 2023-05-15 ENCOUNTER — TELEPHONE (OUTPATIENT)
Age: 78
End: 2023-05-15

## 2023-05-19 RX ORDER — ESCITALOPRAM OXALATE 5 MG/1
TABLET ORAL
Qty: 90 TABLET | Refills: 3 | Status: SHIPPED | OUTPATIENT
Start: 2023-05-19

## 2023-05-26 ENCOUNTER — TELEPHONE (OUTPATIENT)
Age: 78
End: 2023-05-26

## 2023-06-19 RX ORDER — HYDROCHLOROTHIAZIDE 12.5 MG/1
TABLET ORAL
Qty: 90 TABLET | Refills: 3 | Status: SHIPPED | OUTPATIENT
Start: 2023-06-19

## 2023-06-22 ENCOUNTER — TELEPHONE (OUTPATIENT)
Age: 78
End: 2023-06-22

## 2023-06-22 ENCOUNTER — TELEMEDICINE (OUTPATIENT)
Age: 78
End: 2023-06-22
Payer: MEDICARE

## 2023-06-22 DIAGNOSIS — I10 ESSENTIAL (PRIMARY) HYPERTENSION: ICD-10-CM

## 2023-06-22 DIAGNOSIS — J98.9 RESPIRATORY ILLNESS: Primary | ICD-10-CM

## 2023-06-22 DIAGNOSIS — R42 DIZZY: ICD-10-CM

## 2023-06-22 PROCEDURE — G8399 PT W/DXA RESULTS DOCUMENT: HCPCS | Performed by: FAMILY MEDICINE

## 2023-06-22 PROCEDURE — 99214 OFFICE O/P EST MOD 30 MIN: CPT | Performed by: FAMILY MEDICINE

## 2023-06-22 PROCEDURE — G8427 DOCREV CUR MEDS BY ELIG CLIN: HCPCS | Performed by: FAMILY MEDICINE

## 2023-06-22 PROCEDURE — 1090F PRES/ABSN URINE INCON ASSESS: CPT | Performed by: FAMILY MEDICINE

## 2023-06-22 PROCEDURE — 1123F ACP DISCUSS/DSCN MKR DOCD: CPT | Performed by: FAMILY MEDICINE

## 2023-06-22 RX ORDER — DOXYCYCLINE HYCLATE 100 MG
100 TABLET ORAL 2 TIMES DAILY
Qty: 20 TABLET | Refills: 0 | Status: SHIPPED | OUTPATIENT
Start: 2023-06-22 | End: 2023-07-02

## 2023-06-22 RX ORDER — BENZONATATE 200 MG/1
200 CAPSULE ORAL 3 TIMES DAILY PRN
Qty: 30 CAPSULE | Refills: 0 | Status: SHIPPED | OUTPATIENT
Start: 2023-06-22 | End: 2023-07-02

## 2023-06-22 SDOH — ECONOMIC STABILITY: FOOD INSECURITY: WITHIN THE PAST 12 MONTHS, YOU WORRIED THAT YOUR FOOD WOULD RUN OUT BEFORE YOU GOT MONEY TO BUY MORE.: NEVER TRUE

## 2023-06-22 SDOH — ECONOMIC STABILITY: FOOD INSECURITY: WITHIN THE PAST 12 MONTHS, THE FOOD YOU BOUGHT JUST DIDN'T LAST AND YOU DIDN'T HAVE MONEY TO GET MORE.: NEVER TRUE

## 2023-06-22 SDOH — ECONOMIC STABILITY: HOUSING INSECURITY
IN THE LAST 12 MONTHS, WAS THERE A TIME WHEN YOU DID NOT HAVE A STEADY PLACE TO SLEEP OR SLEPT IN A SHELTER (INCLUDING NOW)?: NO

## 2023-06-22 SDOH — ECONOMIC STABILITY: INCOME INSECURITY: HOW HARD IS IT FOR YOU TO PAY FOR THE VERY BASICS LIKE FOOD, HOUSING, MEDICAL CARE, AND HEATING?: NOT HARD AT ALL

## 2023-06-22 ASSESSMENT — PATIENT HEALTH QUESTIONNAIRE - PHQ9
SUM OF ALL RESPONSES TO PHQ9 QUESTIONS 1 & 2: 0
SUM OF ALL RESPONSES TO PHQ QUESTIONS 1-9: 0
2. FEELING DOWN, DEPRESSED OR HOPELESS: 0
SUM OF ALL RESPONSES TO PHQ QUESTIONS 1-9: 0
1. LITTLE INTEREST OR PLEASURE IN DOING THINGS: 0

## 2023-06-22 NOTE — TELEPHONE ENCOUNTER
Pt calling back;  Pt stated that per  for pt to call back to office with at home covide test; Covid test negative  (-); per pt's caregiver also stated that RX has been sent; Call pt back with any questions or concerns      Thank You

## 2023-06-22 NOTE — PROGRESS NOTES
Chief Complaint   Patient presents with    Cough     PRODUCTIVE, GREEN-BROWN SPUTUM. TRIED NYQUIL. Health Maintenance Due   Topic Date Due    DTaP/Tdap/Td vaccine (1 - Tdap) Never done    COVID-19 Vaccine (4 - Booster for Pfizer series) 12/01/2021    Lipids  05/09/2023           1. \"Have you been to the ER, urgent care clinic since your last visit? Hospitalized since your last visit? \" No    2. \"Have you seen or consulted any other health care providers outside of the [address] [place] [address] since your last visit? \" No     3. For patients aged 39-70: Has the patient had a colonoscopy / FIT/ Cologuard? NA - based on age      If the patient is female:    4. For patients aged 41-77: Has the patient had a mammogram within the past 2 years? NA - based on age or sex      11. For patients aged 21-65: Has the patient had a pap smear?  NA - based on age or sex

## 2023-06-22 NOTE — PROGRESS NOTES
THIS VISIT WAS COMPLETED VIRTUALLY USING [employer] Virtual Visit    HPI  Radha Farias is a 70 y.o. female who presents on day 3 of a respiratory illness. She is producing thick green-brown mucus. Cough is coming in fits. Had a rough night where she did not sleep very much although NyQuil was somewhat helpful. She had a fever of 101 measured orally this morning. Took Tylenol and is feeling a little bit better. She has been vaguely dizzy for the last few days.   Blood pressure this morning was 128/74 pulse 80    PMHx:  Past Medical History:   Diagnosis Date    Anxiety     Arrhythmia     Arthritis     Breast cancer ([facility] [address]     right side    GERD (gastroesophageal reflux disease)     High cholesterol     HTN (hypertension)     Hypothyroid     Memory deficit     Menopause     Radiation therapy complication     Shingles outbreak        Meds:   Current Outpatient Medications   Medication Sig Dispense Refill    doxycycline hyclate (VIBRA-TABS) 100 MG tablet Take 1 tablet by mouth 2 times daily for 10 days 20 tablet 0    benzonatate (TESSALON) 200 MG capsule Take 1 capsule by mouth 3 times daily as needed for Cough 30 capsule 0    hydroCHLOROthiazide (HYDRODIURIL) 12.5 MG tablet TAKE 1 TABLET DAILY 90 tablet 3    escitalopram (LEXAPRO) 5 MG tablet TAKE 1 TABLET DAILY 90 tablet 3    memantine (NAMENDA) 10 MG tablet TAKE 1 TABLET TWICE A  tablet 3    alendronate (FOSAMAX) 70 MG tablet TAKE 1 TABLET EVERY 7 DAYS      amLODIPine (NORVASC) 2.5 MG tablet Take by mouth daily      ascorbic acid (VITAMIN C) 500 MG tablet Take by mouth      aspirin 81 MG chewable tablet Take by mouth daily      atenolol (TENORMIN) 25 MG tablet TAKE 2 TABLETS DAILY      atorvastatin (LIPITOR) 40 MG tablet TAKE 1 TABLET NIGHTLY FOR CHOLESTEROL      Cholecalciferol (VITAMIN D) 10 MCG (400 UNIT) CAPS Capsule Take by mouth      Coenzyme Q10 10 MG CAPS Take by mouth      letrozole (FEMARA) 2.5 MG tablet TAKE 1 TABLET DAILY      levothyroxine

## 2023-06-26 ENCOUNTER — TELEPHONE (OUTPATIENT)
Age: 78
End: 2023-06-26

## 2023-07-21 NOTE — PROGRESS NOTES
HISTORY OF PRESENT ILLNESS  Brandon Jeffery is a 70 y.o. female     HPI Established patient presents for follow-up for RIGHT breast cancer. Denies breast mass, skin changes, nipple discharge and pain. Breast history -   Referring - Dr. Jose Villalba  10/13/21 - RIGHT breast biopsy - ILC - grade 1, ER 99, KS 0, Ki 67 5%, Her2 equivocal, FISH negative   11/18/21 - RIGHT breast core biopsy -   Benign breast tissue. No discrete lesion or mass identified   12/15/21 - RIGHT breast lumpectomy and SLNB - Dr. Adeel Becerra  1.5cm [facility]; node negative 0/2; T1cN0  2/21/22 - Completed adjuvant radiation   3/7/22 - started adjuvant letrozole - Dr. Kerri Fragoso        Family history -   Father - colon cancer      OB History    No obstetric history on file. Obstetric Comments   Menarche 17, LMP 58, # of children 1, age of 4st delivery 23, Hysterectomy/oophorectomy Yes/Yes, Breast bx No, history of breast feeding No, BCP No, Hormone therapy No                   Past Surgical History:   Procedure Laterality Date    BREAST LUMPECTOMY Right 12/16/2021    RIGHT BREAST LUMPECTOMY WITH ULTRASOUND AND RIGHT BREAST SENTINEL NODE BIOPSY performed by Alessio Cerda MD at [facility] AMBULATORY OR    COLONOSCOPY N/A 06/07/2019    COLONOSCOPY performed by Livan Jacobs MD at [address]      hysterectomy    GYN      vaginal birth x 1    HYSTERECTOMY (CERVIX STATUS UNKNOWN)      US BREAST BIOPSY W LOC DEVICE 1ST LESION RIGHT Right 11/18/2021    US BREAST NEEDLE BIOPSY RIGHT 11/18/2021 [address] RAD MAMMO    US BREAST BIOPSY W LOC DEVICE 1ST LESION RIGHT Right 10/13/2021    US BREAST NEEDLE BIOPSY RIGHT 10/13/2021 [facility] RAD US           Mammogram Result (most recent):  [facility] DIGITAL DIAGNOSTIC BILATERAL 10/06/2022    Narrative  This is a summary report. The complete report is available in the patient's medical record. If you cannot access the medical record, please contact the sending organization for a detailed fax or copy.     STUDY:

## 2023-07-24 ENCOUNTER — OFFICE VISIT (OUTPATIENT)
Age: 78
End: 2023-07-24
Payer: MEDICARE

## 2023-07-24 VITALS — BODY MASS INDEX: 24.55 KG/M2 | HEIGHT: 61 IN | WEIGHT: 130 LBS

## 2023-07-24 DIAGNOSIS — Z85.3 HISTORY OF BREAST CANCER IN FEMALE: ICD-10-CM

## 2023-07-24 DIAGNOSIS — Z17.0 MALIGNANT NEOPLASM OF UPPER-OUTER QUADRANT OF RIGHT BREAST IN FEMALE, ESTROGEN RECEPTOR POSITIVE (HCC): Primary | ICD-10-CM

## 2023-07-24 DIAGNOSIS — Z92.3 S/P RADIATION THERAPY: ICD-10-CM

## 2023-07-24 DIAGNOSIS — C50.411 MALIGNANT NEOPLASM OF UPPER-OUTER QUADRANT OF RIGHT BREAST IN FEMALE, ESTROGEN RECEPTOR POSITIVE (HCC): Primary | ICD-10-CM

## 2023-07-24 DIAGNOSIS — Z98.890 S/P LUMPECTOMY OF BREAST: ICD-10-CM

## 2023-07-24 PROCEDURE — 1090F PRES/ABSN URINE INCON ASSESS: CPT | Performed by: NURSE PRACTITIONER

## 2023-07-24 PROCEDURE — 1123F ACP DISCUSS/DSCN MKR DOCD: CPT | Performed by: NURSE PRACTITIONER

## 2023-07-24 PROCEDURE — 99213 OFFICE O/P EST LOW 20 MIN: CPT | Performed by: NURSE PRACTITIONER

## 2023-07-24 PROCEDURE — 4004F PT TOBACCO SCREEN RCVD TLK: CPT | Performed by: NURSE PRACTITIONER

## 2023-07-24 PROCEDURE — G8420 CALC BMI NORM PARAMETERS: HCPCS | Performed by: NURSE PRACTITIONER

## 2023-07-24 PROCEDURE — G8399 PT W/DXA RESULTS DOCUMENT: HCPCS | Performed by: NURSE PRACTITIONER

## 2023-07-24 PROCEDURE — G8427 DOCREV CUR MEDS BY ELIG CLIN: HCPCS | Performed by: NURSE PRACTITIONER

## 2023-09-19 RX ORDER — AMLODIPINE BESYLATE 2.5 MG/1
2.5 TABLET ORAL DAILY
Qty: 90 TABLET | Refills: 3 | Status: SHIPPED | OUTPATIENT
Start: 2023-09-19

## 2023-10-02 ENCOUNTER — HOSPITAL ENCOUNTER (OUTPATIENT)
Facility: HOSPITAL | Age: 78
Discharge: HOME | End: 2023-10-05
Payer: MEDICARE

## 2023-10-02 DIAGNOSIS — Z85.3 HISTORY OF BREAST CANCER IN FEMALE: ICD-10-CM

## 2023-10-02 PROCEDURE — G0279 TOMOSYNTHESIS, MAMMO: HCPCS

## 2023-10-03 ENCOUNTER — TELEMEDICINE (OUTPATIENT)
Age: 78
End: 2023-10-03
Payer: MEDICARE

## 2023-10-03 DIAGNOSIS — F41.9 ANXIETY DISORDER, UNSPECIFIED TYPE: ICD-10-CM

## 2023-10-03 DIAGNOSIS — G30.1 MODERATE LATE ONSET ALZHEIMER'S DEMENTIA WITH ANXIETY (HCC): Primary | ICD-10-CM

## 2023-10-03 DIAGNOSIS — F02.B4 MODERATE LATE ONSET ALZHEIMER'S DEMENTIA WITH ANXIETY (HCC): Primary | ICD-10-CM

## 2023-10-03 PROCEDURE — 99213 OFFICE O/P EST LOW 20 MIN: CPT | Performed by: NURSE PRACTITIONER

## 2023-10-03 PROCEDURE — 1123F ACP DISCUSS/DSCN MKR DOCD: CPT | Performed by: NURSE PRACTITIONER

## 2023-10-03 ASSESSMENT — PATIENT HEALTH QUESTIONNAIRE - PHQ9
SUM OF ALL RESPONSES TO PHQ QUESTIONS 1-9: 0
2. FEELING DOWN, DEPRESSED OR HOPELESS: 0
SUM OF ALL RESPONSES TO PHQ9 QUESTIONS 1 & 2: 0
SUM OF ALL RESPONSES TO PHQ QUESTIONS 1-9: 0
1. LITTLE INTEREST OR PLEASURE IN DOING THINGS: 0
SUM OF ALL RESPONSES TO PHQ QUESTIONS 1-9: 0
SUM OF ALL RESPONSES TO PHQ QUESTIONS 1-9: 0

## 2023-10-03 NOTE — PROGRESS NOTES
Neurology Note    Patient ID:  Iggy Lora  [id number]  89 y.o.  1945      Date of Consultation:  October 3, 2023        Reason for Consultation: Alzheimer's dementia    This is a telemedicine visit that was performed with in the originating site at patient's home and the distance site at [facility] outpatient clinic at [facility].  This telemedicine visit utilized synchronous (real-time) audio-video technology. Verbal consent to participate in the video visit was obtained. This visit occurred during the corona (COVID -19) public health emergency. I discussed with the patient the nature of our telemedicine visit, that:  - I would evaluate the patient and recommend diagnostic and treatment based on my assessment  - Our sessions are not being recorded and that personal health information is protected  - Our team will provide follow-up care in person if and when the patient needs it. Consent:  The patient is aware that this patient-initiated Telehealth encounter is a billable service, with coverage as determined by the patient's insurance carrier. The patient is aware that they may receive a bill and has provided verbal consent to proceed:     Subjective:       History of Present Illness:   Iggy Lora is a 70 y.o. female here today for regular follow-up, last seen March 2023 for Alzheimer's dementia. She is on the video visit with her  and her daughter. They note no new concerning issues but definitely confirm that she is having some troubles. She has difficulty, if her  asks her to go get something, she will forget what she is going for and comes back with the wrong thing. Its been a big difference, as her  notes she is to be very sharp. Her daughter notices that short term memory is the worst, forgets things easy. Repeats things often.   There are some concerns about compliance with medications, they have a neighbor that helps him, they do the best they can

## 2023-10-03 NOTE — PROGRESS NOTES
Chief Complaint   Patient presents with    Dementia     Patient states she does not think it as bad as other people think . Not something she dwells on      1. Have you been to the ER, urgent care clinic since your last visit? Hospitalized since your last visit? No     2. Have you seen or consulted any other health care providers outside of the [address] [place] [address] since your last visit? Include any pap smears or colon screening.   No

## 2023-10-04 ENCOUNTER — TELEPHONE (OUTPATIENT)
Age: 78
End: 2023-10-04

## 2023-10-04 ENCOUNTER — OFFICE VISIT (OUTPATIENT)
Age: 78
End: 2023-10-04
Payer: MEDICARE

## 2023-10-04 VITALS
BODY MASS INDEX: 23.6 KG/M2 | SYSTOLIC BLOOD PRESSURE: 125 MMHG | OXYGEN SATURATION: 95 % | HEIGHT: 61 IN | HEART RATE: 60 BPM | WEIGHT: 125 LBS | DIASTOLIC BLOOD PRESSURE: 73 MMHG | RESPIRATION RATE: 16 BRPM | TEMPERATURE: 98.6 F

## 2023-10-04 DIAGNOSIS — G30.1 MODERATE LATE ONSET ALZHEIMER'S DEMENTIA WITH ANXIETY (HCC): ICD-10-CM

## 2023-10-04 DIAGNOSIS — E03.9 HYPOTHYROIDISM, UNSPECIFIED TYPE: ICD-10-CM

## 2023-10-04 DIAGNOSIS — M81.0 AGE-RELATED OSTEOPOROSIS WITHOUT CURRENT PATHOLOGICAL FRACTURE: ICD-10-CM

## 2023-10-04 DIAGNOSIS — E78.5 HYPERLIPIDEMIA, UNSPECIFIED HYPERLIPIDEMIA TYPE: ICD-10-CM

## 2023-10-04 DIAGNOSIS — E55.9 VITAMIN D DEFICIENCY: ICD-10-CM

## 2023-10-04 DIAGNOSIS — C50.911 MALIGNANT NEOPLASM OF RIGHT FEMALE BREAST, UNSPECIFIED ESTROGEN RECEPTOR STATUS, UNSPECIFIED SITE OF BREAST (HCC): ICD-10-CM

## 2023-10-04 DIAGNOSIS — Z23 NEEDS FLU SHOT: ICD-10-CM

## 2023-10-04 DIAGNOSIS — Z78.0 POST-MENOPAUSAL: ICD-10-CM

## 2023-10-04 DIAGNOSIS — Z00.00 ROUTINE GENERAL MEDICAL EXAMINATION AT A HEALTH CARE FACILITY: Primary | ICD-10-CM

## 2023-10-04 DIAGNOSIS — E53.8 B12 DEFICIENCY: ICD-10-CM

## 2023-10-04 DIAGNOSIS — F02.B4 MODERATE LATE ONSET ALZHEIMER'S DEMENTIA WITH ANXIETY (HCC): ICD-10-CM

## 2023-10-04 DIAGNOSIS — I10 PRIMARY HYPERTENSION: ICD-10-CM

## 2023-10-04 PROCEDURE — G8484 FLU IMMUNIZE NO ADMIN: HCPCS | Performed by: FAMILY MEDICINE

## 2023-10-04 PROCEDURE — 1090F PRES/ABSN URINE INCON ASSESS: CPT | Performed by: FAMILY MEDICINE

## 2023-10-04 PROCEDURE — 99214 OFFICE O/P EST MOD 30 MIN: CPT | Performed by: FAMILY MEDICINE

## 2023-10-04 PROCEDURE — G8427 DOCREV CUR MEDS BY ELIG CLIN: HCPCS | Performed by: FAMILY MEDICINE

## 2023-10-04 PROCEDURE — 1036F TOBACCO NON-USER: CPT | Performed by: FAMILY MEDICINE

## 2023-10-04 PROCEDURE — G8399 PT W/DXA RESULTS DOCUMENT: HCPCS | Performed by: FAMILY MEDICINE

## 2023-10-04 PROCEDURE — G8420 CALC BMI NORM PARAMETERS: HCPCS | Performed by: FAMILY MEDICINE

## 2023-10-04 PROCEDURE — G0439 PPPS, SUBSEQ VISIT: HCPCS | Performed by: FAMILY MEDICINE

## 2023-10-04 PROCEDURE — 3074F SYST BP LT 130 MM HG: CPT | Performed by: FAMILY MEDICINE

## 2023-10-04 PROCEDURE — 90694 VACC AIIV4 NO PRSRV 0.5ML IM: CPT | Performed by: FAMILY MEDICINE

## 2023-10-04 PROCEDURE — PBSHW INFLUENZA, FLUAD, (AGE 65 Y+), IM, PF, 0.5 ML: Performed by: FAMILY MEDICINE

## 2023-10-04 PROCEDURE — 3078F DIAST BP <80 MM HG: CPT | Performed by: FAMILY MEDICINE

## 2023-10-04 PROCEDURE — 1123F ACP DISCUSS/DSCN MKR DOCD: CPT | Performed by: FAMILY MEDICINE

## 2023-10-04 ASSESSMENT — PATIENT HEALTH QUESTIONNAIRE - PHQ9
SUM OF ALL RESPONSES TO PHQ QUESTIONS 1-9: 2
SUM OF ALL RESPONSES TO PHQ9 QUESTIONS 1 & 2: 2
2. FEELING DOWN, DEPRESSED OR HOPELESS: 1
SUM OF ALL RESPONSES TO PHQ QUESTIONS 1-9: 2
1. LITTLE INTEREST OR PLEASURE IN DOING THINGS: 1

## 2023-10-04 ASSESSMENT — LIFESTYLE VARIABLES: HOW MANY STANDARD DRINKS CONTAINING ALCOHOL DO YOU HAVE ON A TYPICAL DAY: PATIENT DOES NOT DRINK

## 2023-10-04 NOTE — PROGRESS NOTES
Chief Complaint   Patient presents with    Medicare [place] Maintenance Due   Topic Date Due    DTaP/Tdap/Td vaccine (1 - Tdap) Never done    COVID-19 Vaccine (4 - Pfizer series) 12/01/2021    Lipids  05/09/2023    Flu vaccine (1) 08/01/2023    Annual Wellness Visit (AWV)  09/24/2023           3. For patients aged 43-73: Has the patient had a colonoscopy / FIT/ Cologuard? NA - based on age      If the patient is female:    4. For patients aged 43-66: Has the patient had a mammogram within the past 2 years? NA - based on age or sex      11. For patients aged 21-65: Has the patient had a pap smear?  NA - based on age or sex
receptor status, unspecified site of breast ([address] [facility])  C50.911       8. Vitamin D deficiency  E55.9 Vitamin D 25 Hydroxy      9. B12 deficiency  E53.8 Vitamin B12 & Folate      10.  Post-menopausal  Z78.0 DEXA BONE DENSITY AXIAL SKELETON
none

## 2023-10-04 NOTE — TELEPHONE ENCOUNTER
Pt's  called back; Pt's  is requesting home health, a , or some sorts of that for the pt; pt was has alzheimer; please call pt's  back with information       Thank You

## 2023-10-04 NOTE — PATIENT INSTRUCTIONS
Consider stopping Protonix and see how you do off of this medication. I have ordered a bone density scan (DEXA scan).   If your bone density looks okay you may be able to stop taking your Fosamax

## 2023-10-05 LAB
25(OH)D3 SERPL-MCNC: 42.5 NG/ML (ref 30–100)
ALBUMIN SERPL-MCNC: 4 G/DL (ref 3.5–5)
ALBUMIN/GLOB SERPL: 1.3 (ref 1.1–2.2)
ALP SERPL-CCNC: 65 U/L (ref 45–117)
ALT SERPL-CCNC: 27 U/L (ref 12–78)
ANION GAP SERPL CALC-SCNC: 4 MMOL/L (ref 5–15)
AST SERPL-CCNC: 22 U/L (ref 15–37)
BASOPHILS # BLD: 0 K/UL (ref 0–0.1)
BASOPHILS NFR BLD: 1 % (ref 0–1)
BILIRUB SERPL-MCNC: 0.6 MG/DL (ref 0.2–1)
BUN SERPL-MCNC: 13 MG/DL (ref 6–20)
BUN/CREAT SERPL: 17 (ref 12–20)
CALCIUM SERPL-MCNC: 9.2 MG/DL (ref 8.5–10.1)
CHLORIDE SERPL-SCNC: 101 MMOL/L (ref 97–108)
CHOLEST SERPL-MCNC: 161 MG/DL
CO2 SERPL-SCNC: 30 MMOL/L (ref 21–32)
CREAT SERPL-MCNC: 0.77 MG/DL (ref 0.55–1.02)
DIFFERENTIAL METHOD BLD: NORMAL
EOSINOPHIL # BLD: 0.1 K/UL (ref 0–0.4)
EOSINOPHIL NFR BLD: 2 % (ref 0–7)
ERYTHROCYTE [DISTWIDTH] IN BLOOD BY AUTOMATED COUNT: 13.1 % (ref 11.5–14.5)
FOLATE SERPL-MCNC: 19.8 NG/ML (ref 5–21)
GLOBULIN SER CALC-MCNC: 3 G/DL (ref 2–4)
GLUCOSE SERPL-MCNC: 98 MG/DL (ref 65–100)
HCT VFR BLD AUTO: 40.2 % (ref 35–47)
HDLC SERPL-MCNC: 49 MG/DL
HDLC SERPL: 3.3 (ref 0–5)
HGB BLD-MCNC: 13.4 G/DL (ref 11.5–16)
IMM GRANULOCYTES # BLD AUTO: 0 K/UL (ref 0–0.04)
IMM GRANULOCYTES NFR BLD AUTO: 0 % (ref 0–0.5)
LDLC SERPL CALC-MCNC: 57.8 MG/DL (ref 0–100)
LYMPHOCYTES # BLD: 1.2 K/UL (ref 0.8–3.5)
LYMPHOCYTES NFR BLD: 24 % (ref 12–49)
MCH RBC QN AUTO: 30.3 PG (ref 26–34)
MCHC RBC AUTO-ENTMCNC: 33.3 G/DL (ref 30–36.5)
MCV RBC AUTO: 91 FL (ref 80–99)
MONOCYTES # BLD: 0.5 K/UL (ref 0–1)
MONOCYTES NFR BLD: 10 % (ref 5–13)
NEUTS SEG # BLD: 3.1 K/UL (ref 1.8–8)
NEUTS SEG NFR BLD: 63 % (ref 32–75)
NRBC # BLD: 0 K/UL (ref 0–0.01)
NRBC BLD-RTO: 0 PER 100 WBC
PLATELET # BLD AUTO: 237 K/UL (ref 150–400)
PMV BLD AUTO: 10.8 FL (ref 8.9–12.9)
POTASSIUM SERPL-SCNC: 3.6 MMOL/L (ref 3.5–5.1)
PROT SERPL-MCNC: 7 G/DL (ref 6.4–8.2)
RBC # BLD AUTO: 4.42 M/UL (ref 3.8–5.2)
SODIUM SERPL-SCNC: 135 MMOL/L (ref 136–145)
T4 FREE SERPL-MCNC: 1.3 NG/DL (ref 0.8–1.5)
TRIGL SERPL-MCNC: 271 MG/DL
TSH SERPL DL<=0.05 MIU/L-ACNC: 1.14 UIU/ML (ref 0.36–3.74)
VIT B12 SERPL-MCNC: 729 PG/ML (ref 193–986)
VLDLC SERPL CALC-MCNC: 54.2 MG/DL
WBC # BLD AUTO: 5 K/UL (ref 3.6–11)

## 2023-10-17 RX ORDER — ATORVASTATIN CALCIUM 40 MG/1
TABLET, FILM COATED ORAL
Qty: 90 TABLET | Refills: 3 | Status: SHIPPED | OUTPATIENT
Start: 2023-10-17

## 2023-10-19 ENCOUNTER — TELEPHONE (OUTPATIENT)
Age: 78
End: 2023-10-19

## 2023-11-09 DIAGNOSIS — C50.411 MALIGNANT NEOPLASM OF UPPER-OUTER QUADRANT OF RIGHT BREAST IN FEMALE, ESTROGEN RECEPTOR POSITIVE (HCC): Primary | ICD-10-CM

## 2023-11-09 DIAGNOSIS — Z17.0 MALIGNANT NEOPLASM OF UPPER-OUTER QUADRANT OF RIGHT BREAST IN FEMALE, ESTROGEN RECEPTOR POSITIVE (HCC): Primary | ICD-10-CM

## 2023-11-09 RX ORDER — LETROZOLE 2.5 MG/1
2.5 TABLET, FILM COATED ORAL DAILY
Qty: 90 TABLET | Refills: 3 | Status: SHIPPED | OUTPATIENT
Start: 2023-11-09

## 2023-11-17 RX ORDER — ATENOLOL 25 MG/1
TABLET ORAL
Qty: 180 TABLET | Refills: 3 | Status: SHIPPED | OUTPATIENT
Start: 2023-11-17

## 2023-12-27 RX ORDER — ALENDRONATE SODIUM 70 MG/1
TABLET ORAL
Qty: 12 TABLET | Refills: 3 | Status: SHIPPED | OUTPATIENT
Start: 2023-12-27

## 2024-01-22 ENCOUNTER — OFFICE VISIT (OUTPATIENT)
Age: 79
End: 2024-01-22
Payer: MEDICARE

## 2024-01-22 VITALS — WEIGHT: 125 LBS | BODY MASS INDEX: 23.6 KG/M2 | HEIGHT: 61 IN

## 2024-01-22 DIAGNOSIS — C50.411 MALIGNANT NEOPLASM OF UPPER-OUTER QUADRANT OF RIGHT BREAST IN FEMALE, ESTROGEN RECEPTOR POSITIVE (HCC): Primary | ICD-10-CM

## 2024-01-22 DIAGNOSIS — Z98.890 S/P LUMPECTOMY OF BREAST: ICD-10-CM

## 2024-01-22 DIAGNOSIS — Z85.3 HISTORY OF BREAST CANCER IN FEMALE: ICD-10-CM

## 2024-01-22 DIAGNOSIS — Z92.3 S/P RADIATION THERAPY: ICD-10-CM

## 2024-01-22 DIAGNOSIS — Z17.0 MALIGNANT NEOPLASM OF UPPER-OUTER QUADRANT OF RIGHT BREAST IN FEMALE, ESTROGEN RECEPTOR POSITIVE (HCC): Primary | ICD-10-CM

## 2024-01-22 PROCEDURE — G8420 CALC BMI NORM PARAMETERS: HCPCS | Performed by: NURSE PRACTITIONER

## 2024-01-22 PROCEDURE — 1090F PRES/ABSN URINE INCON ASSESS: CPT | Performed by: NURSE PRACTITIONER

## 2024-01-22 PROCEDURE — 1123F ACP DISCUSS/DSCN MKR DOCD: CPT | Performed by: NURSE PRACTITIONER

## 2024-01-22 PROCEDURE — G8484 FLU IMMUNIZE NO ADMIN: HCPCS | Performed by: NURSE PRACTITIONER

## 2024-01-22 PROCEDURE — G8399 PT W/DXA RESULTS DOCUMENT: HCPCS | Performed by: NURSE PRACTITIONER

## 2024-01-22 PROCEDURE — G8427 DOCREV CUR MEDS BY ELIG CLIN: HCPCS | Performed by: NURSE PRACTITIONER

## 2024-01-22 PROCEDURE — 1036F TOBACCO NON-USER: CPT | Performed by: NURSE PRACTITIONER

## 2024-01-22 PROCEDURE — 99213 OFFICE O/P EST LOW 20 MIN: CPT | Performed by: NURSE PRACTITIONER

## 2024-01-22 NOTE — PROGRESS NOTES
HISTORY OF PRESENT ILLNESS  Lia Lopez is a 78 y.o. female     HPI   Established patient presents for follow-up for RIGHT breast cancer. Denies breast mass, skin changes, nipple discharge and pain.          Breast history -   Referring - Dr. Hira Montano  10/13/21 - RIGHT breast biopsy - ILC - grade 1, ER 99, MD 0, Ki 67 5%, Her2 equivocal, FISH negative   11/18/21 - RIGHT breast core biopsy -   Benign breast tissue. No discrete lesion or mass identified   12/15/21 - RIGHT breast lumpectomy and SLNB - Dr. Banerjee  1.5cm ILC; node negative 0/2; T1cN0  2/21/22 - Completed adjuvant radiation   3/7/22 - started adjuvant letrozole - Dr. Saavedra        Family history -   Father - colon cancer      OB History    No obstetric history on file.      Obstetric Comments   Menarche 15, LMP 1996, # of children 1, age of 1st delivery 25, Hysterectomy/oophorectomy Yes/Yes, Breast bx No, history of breast feeding No, BCP No, Hormone therapy No                   Past Surgical History:   Procedure Laterality Date    BREAST LUMPECTOMY Right 12/16/2021    RIGHT BREAST LUMPECTOMY WITH ULTRASOUND AND RIGHT BREAST SENTINEL NODE BIOPSY performed by Jessica Banerjee MD at [facility] AMBULATORY OR    COLONOSCOPY N/A 06/07/2019    COLONOSCOPY performed by Raffy Braxton MD at [facility] ENDOSCOPY    GYN      hysterectomy    GYN      vaginal birth x 1    HYSTERECTOMY (CERVIX STATUS UNKNOWN)      US BREAST BIOPSY W LOC DEVICE 1ST LESION RIGHT Right 11/18/2021    US BREAST NEEDLE BIOPSY RIGHT 11/18/2021 [facility] RAD MAMMO    US BREAST BIOPSY W LOC DEVICE 1ST LESION RIGHT Right 10/13/2021    US BREAST NEEDLE BIOPSY RIGHT 10/13/2021 [facility] RAD US         Mammogram Result (most recent):  [facility] DIGITAL DIAGNOSTIC BILATERAL 10/02/2023    Narrative  STUDY:  Bilateral Diagnostic Digital Mammography    INDICATION:  Right lumpectomy for carcinoma December 2021    COMPARISON:  Prior mammograms dating back to 2019    BREAST COMPOSITION: There are scattered areas of

## 2024-01-25 RX ORDER — PANTOPRAZOLE SODIUM 40 MG/1
TABLET, DELAYED RELEASE ORAL
Qty: 90 TABLET | Refills: 3 | Status: SHIPPED | OUTPATIENT
Start: 2024-01-25

## 2024-02-21 RX ORDER — LEVOTHYROXINE SODIUM 0.1 MG/1
TABLET ORAL
Qty: 90 TABLET | Refills: 3 | Status: SHIPPED | OUTPATIENT
Start: 2024-02-21

## 2024-04-08 ENCOUNTER — TELEPHONE (OUTPATIENT)
Age: 79
End: 2024-04-08

## 2024-04-08 DIAGNOSIS — C50.411 MALIGNANT NEOPLASM OF UPPER-OUTER QUADRANT OF RIGHT BREAST IN FEMALE, ESTROGEN RECEPTOR POSITIVE (HCC): ICD-10-CM

## 2024-04-08 DIAGNOSIS — Z17.0 MALIGNANT NEOPLASM OF UPPER-OUTER QUADRANT OF RIGHT BREAST IN FEMALE, ESTROGEN RECEPTOR POSITIVE (HCC): ICD-10-CM

## 2024-04-09 RX ORDER — LETROZOLE 2.5 MG/1
2.5 TABLET, FILM COATED ORAL DAILY
Qty: 90 TABLET | Refills: 0 | Status: SHIPPED | OUTPATIENT
Start: 2024-04-09

## 2024-04-09 NOTE — TELEPHONE ENCOUNTER
PER VORDOMINGO from Dr. Saavedra LETROZOLE 2.5MG (FEMERA) ONE TAB ONCE A DAY QUANTITY 90 REFILL 0    Called and left VM explaining we sent 90 day to mail order.  If they need 30 day to local then please call back to clarify.

## 2024-04-19 ENCOUNTER — TELEPHONE (OUTPATIENT)
Age: 79
End: 2024-04-19

## 2024-04-19 NOTE — TELEPHONE ENCOUNTER
Spoke with patients , Alvarado, on PHI. States patients memory/dementia has worsened. She forgets things like where the trash cans go. Losing the ability to plan meals and follow through.  Scheduled patient on 4/22/24 at 8:30 AM

## 2024-04-19 NOTE — TELEPHONE ENCOUNTER
Patient , Alvarado, calling to ask when Cat Rivera wants to see patient again. He states her dementia is getting worse. Please give him a call back at . Thank you.

## 2024-04-22 ENCOUNTER — OFFICE VISIT (OUTPATIENT)
Age: 79
End: 2024-04-22
Payer: MEDICARE

## 2024-04-22 VITALS
OXYGEN SATURATION: 97 % | BODY MASS INDEX: 24.35 KG/M2 | WEIGHT: 129 LBS | SYSTOLIC BLOOD PRESSURE: 158 MMHG | HEIGHT: 61 IN | HEART RATE: 56 BPM | DIASTOLIC BLOOD PRESSURE: 80 MMHG | RESPIRATION RATE: 18 BRPM

## 2024-04-22 DIAGNOSIS — G30.1 MODERATE LATE ONSET ALZHEIMER'S DEMENTIA WITH ANXIETY (HCC): Primary | ICD-10-CM

## 2024-04-22 DIAGNOSIS — F02.B4 MODERATE LATE ONSET ALZHEIMER'S DEMENTIA WITH ANXIETY (HCC): Primary | ICD-10-CM

## 2024-04-22 PROCEDURE — 1123F ACP DISCUSS/DSCN MKR DOCD: CPT | Performed by: NURSE PRACTITIONER

## 2024-04-22 PROCEDURE — 1090F PRES/ABSN URINE INCON ASSESS: CPT | Performed by: NURSE PRACTITIONER

## 2024-04-22 PROCEDURE — 99213 OFFICE O/P EST LOW 20 MIN: CPT | Performed by: NURSE PRACTITIONER

## 2024-04-22 PROCEDURE — 3077F SYST BP >= 140 MM HG: CPT | Performed by: NURSE PRACTITIONER

## 2024-04-22 PROCEDURE — G8399 PT W/DXA RESULTS DOCUMENT: HCPCS | Performed by: NURSE PRACTITIONER

## 2024-04-22 PROCEDURE — G8420 CALC BMI NORM PARAMETERS: HCPCS | Performed by: NURSE PRACTITIONER

## 2024-04-22 PROCEDURE — 1036F TOBACCO NON-USER: CPT | Performed by: NURSE PRACTITIONER

## 2024-04-22 PROCEDURE — G8427 DOCREV CUR MEDS BY ELIG CLIN: HCPCS | Performed by: NURSE PRACTITIONER

## 2024-04-22 PROCEDURE — 3079F DIAST BP 80-89 MM HG: CPT | Performed by: NURSE PRACTITIONER

## 2024-04-22 ASSESSMENT — PATIENT HEALTH QUESTIONNAIRE - PHQ9
SUM OF ALL RESPONSES TO PHQ QUESTIONS 1-9: 0
2. FEELING DOWN, DEPRESSED OR HOPELESS: NOT AT ALL
1. LITTLE INTEREST OR PLEASURE IN DOING THINGS: NOT AT ALL
SUM OF ALL RESPONSES TO PHQ QUESTIONS 1-9: 0
SUM OF ALL RESPONSES TO PHQ9 QUESTIONS 1 & 2: 0

## 2024-04-22 ASSESSMENT — ENCOUNTER SYMPTOMS: TROUBLE SWALLOWING: 0

## 2024-04-22 NOTE — PROGRESS NOTES
Chief Complaint   Patient presents with    Dementia     Per  - dementia is getting worse - forgets everyday things like where the trash can goes- no longer can plan meals and follow through ect....     1. Have you been to the ER, urgent care clinic since your last visit?  Hospitalized since your last visit? No     2. Have you seen or consulted any other health care providers outside of the [facility] since your last visit?  Include any pap smears or colon screening.  Yes Dentist - Dermatologist and Eye

## 2024-04-22 NOTE — PROGRESS NOTES
[facility]  [address]  [place] [address]  Tel: [phone]  Fax: [phone]      Date:  24     Name:  DERECK ZUÑIGA  :  1945  MRN:  [id number]     PCP:  Hira Montano MD    Chief Complaint   Patient presents with    Dementia     Per  - dementia is getting worse - forgets everyday things like where the trash can goes- no longer can plan meals and follow through ect....       HISTORY OF PRESENT ILLNESS:  Patient presents today for regular follow-up, last seen 2023 for Alzheimer's dementia. Here today with her .  Strong areas: doing well with cleaning the house, washing/drying clothes and putting them away, does the dishes. The house is extremely clean, runs the vacuum and sweeper.  Walking with a friend regularly, 2-3 x week. does some reading.  Weak Areas: hard time with meal planning, will write things down, struggles with the shopping.  Easy meals  No driving  Neuropsych testing  Namenda 10 mg twice a day  Takes the Cognium  Sleep: sometimes wakes up but able to go right back to sleep.  Activities:  Diet: good appetite, no trouble swallowing.   Primary care Dr. Montano:    Recap from last visit :   Assessment and Plan:  1.  Alzheimer's dementia: Patient was unable to tolerate Aricept, she is to continue Namenda 10 mg twice a day.  She denies any side effects or adverse reactions.  We did discuss some of the newer medications that have been approved for Alzheimer's dementia, but have decided not to pursue them at this time.  Patient's daughter plans to utilize resources on the Alzheimer's Society website, in hopes of reaching out to case management/social work.  They do have a neighbor that helps with medications, and they are working on meal delivery ideas as well.  There are no safety or behavior concerns at this time.  Patient is advised she is no longer to be driving based on assessment from [facility].  If there are

## 2024-04-29 RX ORDER — MEMANTINE HYDROCHLORIDE 10 MG/1
10 TABLET ORAL 2 TIMES DAILY
Qty: 180 TABLET | Refills: 3 | Status: SHIPPED | OUTPATIENT
Start: 2024-04-29

## 2024-05-13 RX ORDER — ESCITALOPRAM OXALATE 5 MG/1
TABLET ORAL
Qty: 90 TABLET | Refills: 3 | Status: SHIPPED | OUTPATIENT
Start: 2024-05-13

## 2024-06-14 RX ORDER — HYDROCHLOROTHIAZIDE 12.5 MG/1
TABLET ORAL
Qty: 90 TABLET | Refills: 3 | Status: SHIPPED | OUTPATIENT
Start: 2024-06-14

## 2024-06-18 NOTE — PROGRESS NOTES
Lia Lopez is a 77 y.o. female here for six month follow up for right breast cancer.  She is taking Letrozole.  Pt states she is doing well. No concerns brought up.     1. Have you been to the ER, urgent care clinic since your last visit?  Hospitalized since your last visit? no    2. Have you seen or consulted any other health care providers outside of the [facility] since your last visit?  Include any pap smears or colon screening. dentist

## 2024-06-20 ENCOUNTER — OFFICE VISIT (OUTPATIENT)
Age: 79
End: 2024-06-20
Payer: MEDICARE

## 2024-06-20 VITALS
BODY MASS INDEX: 24.58 KG/M2 | TEMPERATURE: 98 F | DIASTOLIC BLOOD PRESSURE: 76 MMHG | WEIGHT: 130.2 LBS | HEART RATE: 65 BPM | OXYGEN SATURATION: 98 % | HEIGHT: 61 IN | SYSTOLIC BLOOD PRESSURE: 144 MMHG

## 2024-06-20 DIAGNOSIS — Z17.0 MALIGNANT NEOPLASM OF UPPER-OUTER QUADRANT OF RIGHT BREAST IN FEMALE, ESTROGEN RECEPTOR POSITIVE (HCC): Primary | ICD-10-CM

## 2024-06-20 DIAGNOSIS — C50.411 MALIGNANT NEOPLASM OF UPPER-OUTER QUADRANT OF RIGHT BREAST IN FEMALE, ESTROGEN RECEPTOR POSITIVE (HCC): Primary | ICD-10-CM

## 2024-06-20 DIAGNOSIS — Z79.899 ENCOUNTER FOR MONITORING ADJUVANT HORMONAL THERAPY: ICD-10-CM

## 2024-06-20 DIAGNOSIS — Z51.81 ENCOUNTER FOR MONITORING ADJUVANT HORMONAL THERAPY: ICD-10-CM

## 2024-06-20 PROCEDURE — 1090F PRES/ABSN URINE INCON ASSESS: CPT | Performed by: INTERNAL MEDICINE

## 2024-06-20 PROCEDURE — G8427 DOCREV CUR MEDS BY ELIG CLIN: HCPCS | Performed by: INTERNAL MEDICINE

## 2024-06-20 PROCEDURE — G8399 PT W/DXA RESULTS DOCUMENT: HCPCS | Performed by: INTERNAL MEDICINE

## 2024-06-20 PROCEDURE — 1036F TOBACCO NON-USER: CPT | Performed by: INTERNAL MEDICINE

## 2024-06-20 PROCEDURE — 99213 OFFICE O/P EST LOW 20 MIN: CPT | Performed by: INTERNAL MEDICINE

## 2024-06-20 PROCEDURE — 1123F ACP DISCUSS/DSCN MKR DOCD: CPT | Performed by: INTERNAL MEDICINE

## 2024-06-20 PROCEDURE — G8420 CALC BMI NORM PARAMETERS: HCPCS | Performed by: INTERNAL MEDICINE

## 2024-06-20 PROCEDURE — 3078F DIAST BP <80 MM HG: CPT | Performed by: INTERNAL MEDICINE

## 2024-06-20 PROCEDURE — 3077F SYST BP >= 140 MM HG: CPT | Performed by: INTERNAL MEDICINE

## 2024-06-20 NOTE — PROGRESS NOTES
[facility]   at [facility]   [address]   [place] [address]   [phone]      Oncology Note          Patient: Lia Lopez  MRN: [id number]   SSN: [id number]    YOB: 1945              Diagnosis:        1. Right breast carcinoma: Dx: 10/2021   T1c N0 (Stage I) invasive lobular carcinoma, Tumor size 15 mm, LN -ve, grade 1, ER 99%, MO 0%, Her 2 2+, FISH -ve. DCIS present.         Treatment:        1. S/P right breast lumpectomy 12/15/2021   2. Completed adjuvant radiation 02/21/2022   3. Adjuvant Letrozole, start 03/07/2022         Subjective:         Lia Lopez is a 77 y.o.  female who I am seeing for a new diagnosis of right sided invasive lobular carcinoma. She underwent a routine screening mammogram. An abnormal mammogram led to a biopsy which revealed invasive lobular  carcinoma. She then underwent a right breast lumpectomy 12/16/2021. She completed adjuvant radiation in Feb 2022. She is taking Letrozole.        Review of Systems:      Constitutional: negative   Eyes: negative   Ears, Nose, Mouth, Throat, and Face: negative   Respiratory: negative   Cardiovascular: negative   Gastrointestinal: negative   Genitourinary:negative   Integument/Breast: negative   Hematologic/Lymphatic: negative   Musculoskeletal:negative   Neurological: negative      Past Medical History:   Diagnosis Date    Anxiety     Arrhythmia     Arthritis     Breast cancer (HCC)     right side    GERD (gastroesophageal reflux disease)     High cholesterol     HTN (hypertension)     Hypothyroid     Memory deficit     Menopause     Radiation therapy complication     Shingles outbreak        Past Surgical History:   Procedure Laterality Date    BREAST LUMPECTOMY Right 12/16/2021    RIGHT BREAST LUMPECTOMY WITH ULTRASOUND AND RIGHT BREAST SENTINEL NODE BIOPSY performed by Jessica Banerjee MD at [facility] AMBULATORY OR    COLONOSCOPY N/A

## 2024-07-16 DIAGNOSIS — Z17.0 MALIGNANT NEOPLASM OF UPPER-OUTER QUADRANT OF RIGHT BREAST IN FEMALE, ESTROGEN RECEPTOR POSITIVE (HCC): ICD-10-CM

## 2024-07-16 DIAGNOSIS — C50.411 MALIGNANT NEOPLASM OF UPPER-OUTER QUADRANT OF RIGHT BREAST IN FEMALE, ESTROGEN RECEPTOR POSITIVE (HCC): ICD-10-CM

## 2024-07-16 RX ORDER — LETROZOLE 2.5 MG/1
2.5 TABLET, FILM COATED ORAL DAILY
Qty: 90 TABLET | Refills: 3 | Status: SHIPPED | OUTPATIENT
Start: 2024-07-16

## 2024-08-02 ENCOUNTER — TELEPHONE (OUTPATIENT)
Age: 79
End: 2024-08-02

## 2024-08-02 NOTE — TELEPHONE ENCOUNTER
Pt , Alvarado is calling to inform they are making a new appointment with the Neurologist to discuss new meds for Alzheimer's     No call back needed

## 2024-08-02 NOTE — TELEPHONE ENCOUNTER
Patient, Alvarado, calling to say they learned about two new alzheimers medications and they are wondering if Cat Rivera would recommend them for patient. (He forgot the name of them, but I encouraged him to call back if/when he remembers the name of them.) Thank you.

## 2024-09-11 ENCOUNTER — TELEPHONE (OUTPATIENT)
Age: 79
End: 2024-09-11

## 2024-09-11 RX ORDER — AMLODIPINE BESYLATE 2.5 MG/1
2.5 TABLET ORAL DAILY
Qty: 90 TABLET | Refills: 3 | Status: SHIPPED | OUTPATIENT
Start: 2024-09-11

## 2024-09-20 ENCOUNTER — NURSE ONLY (OUTPATIENT)
Age: 79
End: 2024-09-20
Payer: MEDICARE

## 2024-09-20 DIAGNOSIS — Z23 ENCOUNTER FOR IMMUNIZATION: Primary | ICD-10-CM

## 2024-09-20 PROCEDURE — PBSHW INFLUENZA, FLUAD TRIVALENT, (AGE 65 Y+), IM, PRESERVATIVE FREE, 0.5ML: Performed by: FAMILY MEDICINE

## 2024-09-20 PROCEDURE — G0008 ADMIN INFLUENZA VIRUS VAC: HCPCS | Performed by: FAMILY MEDICINE

## 2024-09-21 NOTE — PERIOP NOTES
[facility]  Ambulatory Surgery Unit  Pre-operative Instructions    Surgery/Procedure Date  Thursday December 16th            Tentative Arrival Time TBD      1. On the day of your surgery/procedure, please report to the Ambulatory Surgery Unit Registration Desk and sign in at your designated time. The Ambulatory Surgery Unit is located in [facility] on the [facility] side of the [facility] [place] across from the [address] [place] [address]. Please have all of your health insurance cards and a photo ID. 2. You must have someone with you to drive you home, as you should not drive a car for 24 hours following anesthesia. Please make arrangements for a responsible adult friend or family member to stay with you for at least the first 24 hours after your surgery. 3. Do not have anything to eat or drink (including water, gum, mints, coffee, juice) after 11:59 PM  Wednesday 12/15/2021. This may not apply to medications prescribed by your physician. (Please note below the special instructions with medications to take the morning of surgery, if applicable.)    4. We recommend you do not drink any alcoholic beverages for 24 hours before and after your surgery. 5. Contact your surgeons office for instructions on the following medications: non-steroidal anti-inflammatory drugs (i.e. Advil, Aleve), vitamins, and supplements. (Some surgeons will want you to stop these medications prior to surgery and others may allow you to take them)   **If you are currently taking Plavix, Coumadin, Aspirin and/or other blood-thinning agents, contact your surgeon for instructions. ** Your surgeon will partner with the physician prescribing these medications to determine if it is safe to stop or if you need to continue taking. Please do not stop taking these medications without instructions from your surgeon.     6. In an effort to help prevent surgical site infection, we ask that you shower with an anti-bacterial soap (i.e. Dial/Safeguard, or the soap provided to you at your preadmission testing appointment) for 3 days prior to and on the morning of surgery, using a fresh towel after each shower. (Please begin this process with fresh bed linens.) Do not apply any lotions, powders, or deodorants after the shower on the day of your procedure. If applicable, please do not shave the operative site for 48 hours prior to surgery. 7. Wear comfortable clothes. Wear glasses instead of contacts. Do not bring any jewelry or money (other than copays or fees as instructed). Do not wear make-up, particularly mascara, the morning of your surgery. Do not wear nail polish, particularly if you are having foot /hand surgery. Wear your hair loose or down, no ponytails, buns, laura pins or clips. All body piercings must be removed. 8. You should understand that if you do not follow these instructions your surgery may be cancelled. If your physical condition changes (i.e. fever, cold or flu) please contact your surgeon as soon as possible. 9. It is important that you be on time. If a situation occurs where you may be late, or if you have any questions or problems, please call [phone].    10. Your surgery time may be subject to change. You will receive a phone call the day prior to surgery to confirm your arrival time. 11. Pediatric patients: please bring a change of clothes, diapers, bottle/sippy cup, pacifier, etc.      Special Instructions: Take all medications and inhalers, as prescribed, on the morning of surgery with a sip of water EXCEPT: n/a      Insulin Dependent Diabetic patients: Take your diabetic medications as prescribed the day before surgery. Hold all diabetic medications the day of surgery. If you are scheduled to arrive for surgery after 8:00 AM, and your AM blood sugar is >200, please call Ambulatory Surgery. I understand a pre-operative phone call will be made to verify my surgery time.   In the event that I am not available, I give permission for a message to be left on my answering service and/or with another person?       Yes    Reviewed instructions via telephone/patient verbalized understanding         ___________________      ___________________      ________________  (Signature of Patient)          (Witness)                   (Date and Time) show

## 2024-10-01 ENCOUNTER — HOSPITAL ENCOUNTER (OUTPATIENT)
Facility: HOSPITAL | Age: 79
Discharge: HOME | End: 2024-10-04
Payer: MEDICARE

## 2024-10-01 DIAGNOSIS — M81.0 AGE-RELATED OSTEOPOROSIS WITHOUT CURRENT PATHOLOGICAL FRACTURE: ICD-10-CM

## 2024-10-01 DIAGNOSIS — Z78.0 POST-MENOPAUSAL: ICD-10-CM

## 2024-10-01 PROCEDURE — 77080 DXA BONE DENSITY AXIAL: CPT

## 2024-10-02 ENCOUNTER — TELEPHONE (OUTPATIENT)
Age: 79
End: 2024-10-02

## 2024-10-02 DIAGNOSIS — M81.0 AGE-RELATED OSTEOPOROSIS WITHOUT CURRENT PATHOLOGICAL FRACTURE: Primary | ICD-10-CM

## 2024-10-02 NOTE — TELEPHONE ENCOUNTER
DEXA scan reviewed.    Some improvement in bone density in some areas.  Has had a decrease in bone density of the arm which suggests an increased risk of overall fractures.  Recommend continuing Fosamax for an additional 2 years and repeating DEXA scan in 2026

## 2024-10-02 NOTE — TELEPHONE ENCOUNTER
Unable to reach pt to inform of dexa scan results. Left voicemail to call back. Per , patients phone number is [phone]

## 2024-10-07 ENCOUNTER — OFFICE VISIT (OUTPATIENT)
Age: 79
End: 2024-10-07
Payer: MEDICARE

## 2024-10-07 VITALS
WEIGHT: 130.2 LBS | RESPIRATION RATE: 16 BRPM | HEART RATE: 54 BPM | DIASTOLIC BLOOD PRESSURE: 67 MMHG | OXYGEN SATURATION: 98 % | SYSTOLIC BLOOD PRESSURE: 135 MMHG | BODY MASS INDEX: 24.58 KG/M2 | HEIGHT: 61 IN | TEMPERATURE: 97.2 F

## 2024-10-07 DIAGNOSIS — E55.9 VITAMIN D DEFICIENCY: ICD-10-CM

## 2024-10-07 DIAGNOSIS — F02.B4 MODERATE LATE ONSET ALZHEIMER'S DEMENTIA WITH ANXIETY (HCC): ICD-10-CM

## 2024-10-07 DIAGNOSIS — I10 PRIMARY HYPERTENSION: ICD-10-CM

## 2024-10-07 DIAGNOSIS — M81.0 AGE-RELATED OSTEOPOROSIS WITHOUT CURRENT PATHOLOGICAL FRACTURE: ICD-10-CM

## 2024-10-07 DIAGNOSIS — E03.9 HYPOTHYROIDISM, UNSPECIFIED TYPE: ICD-10-CM

## 2024-10-07 DIAGNOSIS — Z00.00 ROUTINE GENERAL MEDICAL EXAMINATION AT A HEALTH CARE FACILITY: Primary | ICD-10-CM

## 2024-10-07 DIAGNOSIS — G30.1 MODERATE LATE ONSET ALZHEIMER'S DEMENTIA WITH ANXIETY (HCC): ICD-10-CM

## 2024-10-07 DIAGNOSIS — E53.8 B12 DEFICIENCY: ICD-10-CM

## 2024-10-07 PROCEDURE — G8482 FLU IMMUNIZE ORDER/ADMIN: HCPCS | Performed by: FAMILY MEDICINE

## 2024-10-07 PROCEDURE — 3075F SYST BP GE 130 - 139MM HG: CPT | Performed by: FAMILY MEDICINE

## 2024-10-07 PROCEDURE — 1123F ACP DISCUSS/DSCN MKR DOCD: CPT | Performed by: FAMILY MEDICINE

## 2024-10-07 PROCEDURE — G0439 PPPS, SUBSEQ VISIT: HCPCS | Performed by: FAMILY MEDICINE

## 2024-10-07 PROCEDURE — 3078F DIAST BP <80 MM HG: CPT | Performed by: FAMILY MEDICINE

## 2024-10-07 SDOH — ECONOMIC STABILITY: FOOD INSECURITY: WITHIN THE PAST 12 MONTHS, YOU WORRIED THAT YOUR FOOD WOULD RUN OUT BEFORE YOU GOT MONEY TO BUY MORE.: NEVER TRUE

## 2024-10-07 SDOH — ECONOMIC STABILITY: INCOME INSECURITY: HOW HARD IS IT FOR YOU TO PAY FOR THE VERY BASICS LIKE FOOD, HOUSING, MEDICAL CARE, AND HEATING?: NOT HARD AT ALL

## 2024-10-07 SDOH — ECONOMIC STABILITY: FOOD INSECURITY: WITHIN THE PAST 12 MONTHS, THE FOOD YOU BOUGHT JUST DIDN'T LAST AND YOU DIDN'T HAVE MONEY TO GET MORE.: NEVER TRUE

## 2024-10-07 ASSESSMENT — PATIENT HEALTH QUESTIONNAIRE - PHQ9
1. LITTLE INTEREST OR PLEASURE IN DOING THINGS: NOT AT ALL
SUM OF ALL RESPONSES TO PHQ9 QUESTIONS 1 & 2: 0
SUM OF ALL RESPONSES TO PHQ QUESTIONS 1-9: 0
SUM OF ALL RESPONSES TO PHQ QUESTIONS 1-9: 0
2. FEELING DOWN, DEPRESSED OR HOPELESS: NOT AT ALL
SUM OF ALL RESPONSES TO PHQ QUESTIONS 1-9: 0
SUM OF ALL RESPONSES TO PHQ QUESTIONS 1-9: 0

## 2024-10-07 ASSESSMENT — LIFESTYLE VARIABLES: HOW MANY STANDARD DRINKS CONTAINING ALCOHOL DO YOU HAVE ON A TYPICAL DAY: PATIENT DOES NOT DRINK

## 2024-10-07 NOTE — PROGRESS NOTES
Chief Complaint   Patient presents with    Medicare AWV         Health Maintenance Due   Topic Date Due    DTaP/Tdap/Td vaccine (1 - Tdap) Never done    Respiratory Syncytial Virus (RSV) Pregnant or age 60 yrs+ (1 - 1-dose 60+ series) Never done    COVID-19 Vaccine (5 - 2023-24 season) 09/01/2024    Annual Wellness Visit (Medicare)  10/04/2024    Lipids  10/04/2024         \"Have you been to the ER, urgent care clinic since your last visit?  Hospitalized since your last visit?\"    NO    “Have you seen or consulted any other health care providers outside of [facility] since your last visit?”    YES - When: approximately Yes ago.  Where and Why: Ophthalmology, Dentist, Dermatology.           
7 DAYS 12 tablet 3    atenolol (TENORMIN) 25 MG tablet TAKE 2 TABLETS DAILY 180 tablet 3    Multiple Vitamins-Minerals (WOMENS MULTI PO) Take by mouth      NONFORMULARY Cognium 1 tablet BID      calcium carbonate (OSCAL) 500 MG TABS tablet Take 1 tablet by mouth 2 times daily      Glucosamine-Chondroit-Vit C-Mn (GLUCOSAMINE CHONDR 1500 COMPLX PO) Take by mouth      Multiple Vitamins-Minerals (PRESERVISION AREDS 2 PO) Take by mouth 2 times daily      fexofenadine (HM FEXOFENADINE HCL) 180 MG tablet Take 1 tablet by mouth daily      atorvastatin (LIPITOR) 40 MG tablet TAKE 1 TABLET NIGHTLY FOR CHOLESTEROL 90 tablet 3    ascorbic acid (VITAMIN C) 500 MG tablet Take by mouth      aspirin 81 MG chewable tablet Take by mouth daily      Cholecalciferol (VITAMIN D) 10 MCG (400 UNIT) CAPS Capsule Take by mouth      Coenzyme Q10 10 MG CAPS Take by mouth       No current facility-administered medications for this visit.     Allergies   Allergen Reactions    Donepezil Diarrhea    Gabapentin Diarrhea     Pt had severe diarrhea for 2 days after starting    Pneumococcal Vaccines Swelling     Family History   Problem Relation Age of Onset    Stroke Mother     Cancer Father     Hypertension Sister      Social History     Tobacco Use    Smoking status: Never    Smokeless tobacco: Never   Substance Use Topics    Alcohol use: No     Patient Active Problem List   Diagnosis    Hypothyroidism    PVCs (premature ventricular contractions)    Hyperlipidemia    Hypertension    Age-related osteoporosis without current pathological fracture    Breast cancer, right (HCC)    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  No    Do you average more than 7 drinks per week?  No      Functional Ability and Level of Safety:     Hearing Loss

## 2024-10-08 LAB
25(OH)D3 SERPL-MCNC: 38.8 NG/ML (ref 30–100)
ALBUMIN SERPL-MCNC: 4 G/DL (ref 3.5–5)
ALBUMIN/GLOB SERPL: 1.2 (ref 1.1–2.2)
ALP SERPL-CCNC: 80 U/L (ref 45–117)
ALT SERPL-CCNC: 28 U/L (ref 12–78)
ANION GAP SERPL CALC-SCNC: 5 MMOL/L (ref 2–12)
AST SERPL-CCNC: 22 U/L (ref 15–37)
BASOPHILS # BLD: 0.1 K/UL (ref 0–0.1)
BASOPHILS NFR BLD: 1 % (ref 0–1)
BILIRUB SERPL-MCNC: 0.8 MG/DL (ref 0.2–1)
BUN SERPL-MCNC: 10 MG/DL (ref 6–20)
BUN/CREAT SERPL: 12 (ref 12–20)
CALCIUM SERPL-MCNC: 9 MG/DL (ref 8.5–10.1)
CHLORIDE SERPL-SCNC: 100 MMOL/L (ref 97–108)
CHOLEST SERPL-MCNC: 168 MG/DL
CO2 SERPL-SCNC: 30 MMOL/L (ref 21–32)
CREAT SERPL-MCNC: 0.82 MG/DL (ref 0.55–1.02)
DIFFERENTIAL METHOD BLD: NORMAL
EOSINOPHIL # BLD: 0.1 K/UL (ref 0–0.4)
EOSINOPHIL NFR BLD: 2 % (ref 0–7)
ERYTHROCYTE [DISTWIDTH] IN BLOOD BY AUTOMATED COUNT: 13 % (ref 11.5–14.5)
FOLATE SERPL-MCNC: 74.3 NG/ML (ref 5–21)
GLOBULIN SER CALC-MCNC: 3.3 G/DL (ref 2–4)
GLUCOSE SERPL-MCNC: 88 MG/DL (ref 65–100)
HCT VFR BLD AUTO: 41.2 % (ref 35–47)
HDLC SERPL-MCNC: 53 MG/DL
HDLC SERPL: 3.2 (ref 0–5)
HGB BLD-MCNC: 13.6 G/DL (ref 11.5–16)
IMM GRANULOCYTES # BLD AUTO: 0 K/UL (ref 0–0.04)
IMM GRANULOCYTES NFR BLD AUTO: 0 % (ref 0–0.5)
LDLC SERPL CALC-MCNC: 84.2 MG/DL (ref 0–100)
LYMPHOCYTES # BLD: 1.1 K/UL (ref 0.8–3.5)
LYMPHOCYTES NFR BLD: 19 % (ref 12–49)
MCH RBC QN AUTO: 30.7 PG (ref 26–34)
MCHC RBC AUTO-ENTMCNC: 33 G/DL (ref 30–36.5)
MCV RBC AUTO: 93 FL (ref 80–99)
MONOCYTES # BLD: 0.4 K/UL (ref 0–1)
MONOCYTES NFR BLD: 7 % (ref 5–13)
NEUTS SEG # BLD: 4.2 K/UL (ref 1.8–8)
NEUTS SEG NFR BLD: 71 % (ref 32–75)
NRBC # BLD: 0 K/UL (ref 0–0.01)
NRBC BLD-RTO: 0 PER 100 WBC
PLATELET # BLD AUTO: 250 K/UL (ref 150–400)
PMV BLD AUTO: 11 FL (ref 8.9–12.9)
POTASSIUM SERPL-SCNC: 4.2 MMOL/L (ref 3.5–5.1)
PROT SERPL-MCNC: 7.3 G/DL (ref 6.4–8.2)
RBC # BLD AUTO: 4.43 M/UL (ref 3.8–5.2)
SODIUM SERPL-SCNC: 135 MMOL/L (ref 136–145)
T4 FREE SERPL-MCNC: 1.4 NG/DL (ref 0.8–1.5)
TRIGL SERPL-MCNC: 154 MG/DL
TSH SERPL DL<=0.05 MIU/L-ACNC: 4.56 UIU/ML (ref 0.36–3.74)
VIT B12 SERPL-MCNC: 654 PG/ML (ref 193–986)
VLDLC SERPL CALC-MCNC: 30.8 MG/DL
WBC # BLD AUTO: 6 K/UL (ref 3.6–11)

## 2024-10-18 ENCOUNTER — OFFICE VISIT (OUTPATIENT)
Age: 79
End: 2024-10-18
Payer: MEDICARE

## 2024-10-18 VITALS — BODY MASS INDEX: 24.55 KG/M2 | WEIGHT: 130 LBS | HEIGHT: 61 IN

## 2024-10-18 DIAGNOSIS — C50.911 MALIGNANT NEOPLASM OF RIGHT FEMALE BREAST, UNSPECIFIED ESTROGEN RECEPTOR STATUS, UNSPECIFIED SITE OF BREAST (HCC): Primary | ICD-10-CM

## 2024-10-18 PROCEDURE — G8420 CALC BMI NORM PARAMETERS: HCPCS | Performed by: SURGERY

## 2024-10-18 PROCEDURE — 99213 OFFICE O/P EST LOW 20 MIN: CPT | Performed by: SURGERY

## 2024-10-18 PROCEDURE — G8427 DOCREV CUR MEDS BY ELIG CLIN: HCPCS | Performed by: SURGERY

## 2024-10-18 PROCEDURE — 1123F ACP DISCUSS/DSCN MKR DOCD: CPT | Performed by: SURGERY

## 2024-10-18 PROCEDURE — G8399 PT W/DXA RESULTS DOCUMENT: HCPCS | Performed by: SURGERY

## 2024-10-18 PROCEDURE — G8482 FLU IMMUNIZE ORDER/ADMIN: HCPCS | Performed by: SURGERY

## 2024-10-18 PROCEDURE — 1036F TOBACCO NON-USER: CPT | Performed by: SURGERY

## 2024-10-18 PROCEDURE — 1090F PRES/ABSN URINE INCON ASSESS: CPT | Performed by: SURGERY

## 2024-10-18 NOTE — PROGRESS NOTES
HISTORY OF PRESENT ILLNESS  Lia Lopez is a 79 y.o. female     HPI ESTABLISHED patient here for LEFT breast soreness. Pt states soreness started 2 weeks ago, more tender to touch , no skin  change, nipple inversion or nipple discharge.       Breast history -   Referring - Dr. Hira Montano  10/13/21 - RIGHT breast biopsy - ILC - grade 1, ER 99, WA 0, Ki 67 5%, Her2 equivocal, FISH negative   11/18/21 - RIGHT breast core biopsy -   Benign breast tissue. No discrete lesion or mass identified   12/15/21 - RIGHT breast lumpectomy and SLNB - Dr. Banerjee  1.5cm [facility]; node negative 0/2; T1cN0  2/21/22 - Completed adjuvant radiation   3/7/22 - started adjuvant letrozole - Dr. Saavedra        Family history -   Father - colon cancer           Review of Systems   All other systems reviewed and are negative.        Physical Exam  Vitals and nursing note reviewed.   Chest:   Breasts:     Right: No swelling, bleeding, inverted nipple, mass, nipple discharge, skin change or tenderness.      Left: Tenderness present. No swelling, bleeding, inverted nipple, mass, nipple discharge or skin change.   Lymphadenopathy:      Upper Body:      Right upper body: No axillary adenopathy.      Left upper body: No axillary adenopathy.            ASSESSMENT and PLAN   Diagnosis Orders   1. Malignant neoplasm of right female breast, unspecified estrogen receptor status, unspecified site of breast (HCC)          - pain left side, musculoskeletal  - normal exam  - may try nsaids and heat   20 minutes was spent with patient on counseling and coordination of care.

## 2024-10-22 ENCOUNTER — OFFICE VISIT (OUTPATIENT)
Age: 79
End: 2024-10-22
Payer: MEDICARE

## 2024-10-22 VITALS
SYSTOLIC BLOOD PRESSURE: 150 MMHG | HEIGHT: 61 IN | OXYGEN SATURATION: 98 % | BODY MASS INDEX: 24.55 KG/M2 | HEART RATE: 55 BPM | DIASTOLIC BLOOD PRESSURE: 82 MMHG | WEIGHT: 130 LBS | RESPIRATION RATE: 18 BRPM

## 2024-10-22 DIAGNOSIS — G30.1 MODERATE LATE ONSET ALZHEIMER'S DEMENTIA WITH ANXIETY (HCC): Primary | ICD-10-CM

## 2024-10-22 DIAGNOSIS — F02.B4 MODERATE LATE ONSET ALZHEIMER'S DEMENTIA WITH ANXIETY (HCC): Primary | ICD-10-CM

## 2024-10-22 PROCEDURE — 1123F ACP DISCUSS/DSCN MKR DOCD: CPT | Performed by: NURSE PRACTITIONER

## 2024-10-22 PROCEDURE — G8399 PT W/DXA RESULTS DOCUMENT: HCPCS | Performed by: NURSE PRACTITIONER

## 2024-10-22 PROCEDURE — 3077F SYST BP >= 140 MM HG: CPT | Performed by: NURSE PRACTITIONER

## 2024-10-22 PROCEDURE — G8420 CALC BMI NORM PARAMETERS: HCPCS | Performed by: NURSE PRACTITIONER

## 2024-10-22 PROCEDURE — 1036F TOBACCO NON-USER: CPT | Performed by: NURSE PRACTITIONER

## 2024-10-22 PROCEDURE — G8427 DOCREV CUR MEDS BY ELIG CLIN: HCPCS | Performed by: NURSE PRACTITIONER

## 2024-10-22 PROCEDURE — 1090F PRES/ABSN URINE INCON ASSESS: CPT | Performed by: NURSE PRACTITIONER

## 2024-10-22 PROCEDURE — G8482 FLU IMMUNIZE ORDER/ADMIN: HCPCS | Performed by: NURSE PRACTITIONER

## 2024-10-22 PROCEDURE — 3079F DIAST BP 80-89 MM HG: CPT | Performed by: NURSE PRACTITIONER

## 2024-10-22 PROCEDURE — 99213 OFFICE O/P EST LOW 20 MIN: CPT | Performed by: NURSE PRACTITIONER

## 2024-10-22 ASSESSMENT — ENCOUNTER SYMPTOMS
TROUBLE SWALLOWING: 0
GASTROINTESTINAL NEGATIVE: 1

## 2024-10-22 ASSESSMENT — PATIENT HEALTH QUESTIONNAIRE - PHQ9
SUM OF ALL RESPONSES TO PHQ QUESTIONS 1-9: 0
SUM OF ALL RESPONSES TO PHQ QUESTIONS 1-9: 0
1. LITTLE INTEREST OR PLEASURE IN DOING THINGS: NOT AT ALL
SUM OF ALL RESPONSES TO PHQ QUESTIONS 1-9: 0
SUM OF ALL RESPONSES TO PHQ QUESTIONS 1-9: 0
2. FEELING DOWN, DEPRESSED OR HOPELESS: NOT AT ALL
SUM OF ALL RESPONSES TO PHQ9 QUESTIONS 1 & 2: 0

## 2024-10-22 NOTE — PROGRESS NOTES
Chief Complaint   Patient presents with    Dementia     Follow up- making beds and doing laundry - does fine - not so good at prepping meals ( per )      1. Have you been to the ER, urgent care clinic since your last visit?  Hospitalized since your last visit? No     2. Have you seen or consulted any other health care providers outside of the [facility] since your last visit?  Include any pap smears or colon screening.  Yes - Dentist - Dermatologist    
without fasciculations    Motor Examination: Normal tone. 5/5 muscle strength in bilateral upper and lower extremities. No cogwheel rigidity.  No muscle wasting, no twitching or fasciculation noted.      Sensory exam:  Normal throughout to light touch in BUE and BLE.    Coordination:   Finger to nose and rapid arm movement testing was normal.  No resting or intention tremor.  Negative Romberg, negative pronator drift.      Gait and Station:  Steady gait.  Normal arm swing.      Reflexes:  DTRs 2+ in bilateral biceps, brachioradialis, patella.    ASSESSMENT AND PLAN     Diagnosis Orders   1. Moderate late onset Alzheimer's dementia with anxiety (HCC)          1.  Moderate late onset Alzheimer's dementia with anxiety: Patient is to continue Namenda 10 mg twice a day, unfortunately patient had side effects to donepezil.  Therefore no adjustments have been made to patient's medication regimen.  Patient is well cared for by her , advised for him to contact us for any worsening signs or symptoms.  She does take low-dose Lexapro 5 mg as prescribed by primary care, she is to continue this, however, if any worsening anxiety the dosage of this could be adjusted easily.  Patient is to continue with her regular walking, there are no safety or behavior concerns at this time.  I discussed with the patient and family members in regards to the patient's cognitive limitations and need to ensure patient safety.  Attempts to minimize opportunities of harm is important.   Activities to be monitored, or avoided, would include cooking, ironing clothes, financial bill payments.   Having structure to a day is important for the patient.  Cognitive and physical activities should be considered daily.       Patient and/or family verbalized understand of all instructions and all questions/concerns were addressed.  Safety/side effects of medications discussed.    Patient remains a complex patient secondary to polypharmacy, significant

## 2024-11-01 ENCOUNTER — HOSPITAL ENCOUNTER (OUTPATIENT)
Facility: HOSPITAL | Age: 79
Discharge: HOME | End: 2024-11-04
Payer: MEDICARE

## 2024-11-01 DIAGNOSIS — Z85.3 HISTORY OF BREAST CANCER IN FEMALE: ICD-10-CM

## 2024-11-01 PROCEDURE — G0279 TOMOSYNTHESIS, MAMMO: HCPCS

## 2024-11-13 RX ORDER — ATENOLOL 25 MG/1
TABLET ORAL
Qty: 180 TABLET | Refills: 3 | Status: SHIPPED | OUTPATIENT
Start: 2024-11-13

## 2024-11-22 RX ORDER — ALENDRONATE SODIUM 70 MG/1
TABLET ORAL
Qty: 12 TABLET | Refills: 3 | Status: SHIPPED | OUTPATIENT
Start: 2024-11-22

## 2025-01-21 RX ORDER — PANTOPRAZOLE SODIUM 40 MG/1
TABLET, DELAYED RELEASE ORAL
Qty: 90 TABLET | Refills: 3 | Status: SHIPPED | OUTPATIENT
Start: 2025-01-21

## 2025-01-24 NOTE — PROGRESS NOTES
HISTORY OF PRESENT ILLNESS  Lia Lopez is a 79 y.o. female     HPI  Established patient presents for follow-up for RIGHT breast cancer. Denies breast mass, skin changes, nipple discharge and pain.          Breast history -   Referring - Dr. Hira Montano  10/13/21 - RIGHT breast biopsy - [facility] - grade 1, ER 99, TX 0, Ki 67 5%, Her2 equivocal, FISH negative   11/18/21 - RIGHT breast core biopsy -   Benign breast tissue. No discrete lesion or mass identified   12/15/21 - RIGHT breast lumpectomy and SLNB - Dr. Banerjee  1.5cm [facility]; node negative 0/2; T1cN0  2/21/22 - Completed adjuvant radiation   3/7/22 - started adjuvant letrozole - Dr. Saavedra        Family history -   Father - colon cancer        OB History    No obstetric history on file.      Obstetric Comments   Menarche 15, LMP 1996, # of children 1, age of 1st delivery 25, Hysterectomy/oophorectomy Yes/Yes, Breast bx No, history of breast feeding No, BCP No, Hormone therapy No                   Past Surgical History:   Procedure Laterality Date    BREAST LUMPECTOMY Right 12/16/2021    RIGHT BREAST LUMPECTOMY WITH ULTRASOUND AND RIGHT BREAST SENTINEL NODE BIOPSY performed by Jessica Banerjee MD at [facility] AMBULATORY OR    COLONOSCOPY N/A 06/07/2019    COLONOSCOPY performed by Raffy Braxton MD at [facility] ENDOSCOPY    GYN      hysterectomy    GYN      vaginal birth x 1    HYSTERECTOMY (CERVIX STATUS UNKNOWN)      US BREAST BIOPSY W LOC DEVICE 1ST LESION RIGHT Right 11/18/2021    US BREAST NEEDLE BIOPSY RIGHT 11/18/2021 [facility] RAD MAMMO    US BREAST BIOPSY W LOC DEVICE 1ST LESION RIGHT Right 10/13/2021    US BREAST NEEDLE BIOPSY RIGHT 10/13/2021 [facility] RAD US           Mammogram Result (most recent):  [facility]RISTIE DIGITAL DIAGNOSTIC BILATERAL 11/01/2024    Narrative  EXAM:  [facility] KRISTIE DIGITAL DIAGNOSTIC BILATERAL    INDICATION: Right breast cancer status post lumpectomy in 2021. Annual  mammography.    COMPARISON: Prior studies dating back to 2019    TECHNIQUE: Diagnostic

## 2025-01-27 ENCOUNTER — OFFICE VISIT (OUTPATIENT)
Age: 80
End: 2025-01-27
Payer: MEDICARE

## 2025-01-27 VITALS — WEIGHT: 130 LBS | HEIGHT: 61 IN | BODY MASS INDEX: 24.55 KG/M2

## 2025-01-27 DIAGNOSIS — Z98.890 S/P LUMPECTOMY OF BREAST: ICD-10-CM

## 2025-01-27 DIAGNOSIS — C50.911 MALIGNANT NEOPLASM OF RIGHT FEMALE BREAST, UNSPECIFIED ESTROGEN RECEPTOR STATUS, UNSPECIFIED SITE OF BREAST (HCC): Primary | ICD-10-CM

## 2025-01-27 DIAGNOSIS — Z85.3 HISTORY OF BREAST CANCER IN FEMALE: ICD-10-CM

## 2025-01-27 DIAGNOSIS — Z92.3 S/P RADIATION THERAPY: ICD-10-CM

## 2025-01-27 PROCEDURE — G8399 PT W/DXA RESULTS DOCUMENT: HCPCS | Performed by: NURSE PRACTITIONER

## 2025-01-27 PROCEDURE — G8427 DOCREV CUR MEDS BY ELIG CLIN: HCPCS | Performed by: NURSE PRACTITIONER

## 2025-01-27 PROCEDURE — 1036F TOBACCO NON-USER: CPT | Performed by: NURSE PRACTITIONER

## 2025-01-27 PROCEDURE — 1090F PRES/ABSN URINE INCON ASSESS: CPT | Performed by: NURSE PRACTITIONER

## 2025-01-27 PROCEDURE — 99213 OFFICE O/P EST LOW 20 MIN: CPT | Performed by: NURSE PRACTITIONER

## 2025-01-27 PROCEDURE — G8420 CALC BMI NORM PARAMETERS: HCPCS | Performed by: NURSE PRACTITIONER

## 2025-01-27 PROCEDURE — 1159F MED LIST DOCD IN RCRD: CPT | Performed by: NURSE PRACTITIONER

## 2025-01-27 PROCEDURE — 1160F RVW MEDS BY RX/DR IN RCRD: CPT | Performed by: NURSE PRACTITIONER

## 2025-01-27 PROCEDURE — 1123F ACP DISCUSS/DSCN MKR DOCD: CPT | Performed by: NURSE PRACTITIONER

## 2025-02-06 ENCOUNTER — TELEPHONE (OUTPATIENT)
Age: 80
End: 2025-02-06

## 2025-02-06 NOTE — TELEPHONE ENCOUNTER
Pt , Alvarado is calling to inform the pt has a fever, no voice, and cough    Pt is being given Tylenol for the fever every 4 hours and some of Alvarado's cough meds from when he was sick last time    Is there anything else they can do at home?    Please call to discuss

## 2025-02-07 ENCOUNTER — OFFICE VISIT (OUTPATIENT)
Age: 80
End: 2025-02-07
Payer: MEDICARE

## 2025-02-07 VITALS
RESPIRATION RATE: 17 BRPM | DIASTOLIC BLOOD PRESSURE: 66 MMHG | BODY MASS INDEX: 25.04 KG/M2 | SYSTOLIC BLOOD PRESSURE: 101 MMHG | WEIGHT: 132.6 LBS | OXYGEN SATURATION: 98 % | HEIGHT: 61 IN | TEMPERATURE: 98.1 F | HEART RATE: 64 BPM

## 2025-02-07 DIAGNOSIS — F02.B4 MODERATE LATE ONSET ALZHEIMER'S DEMENTIA WITH ANXIETY (HCC): ICD-10-CM

## 2025-02-07 DIAGNOSIS — I10 PRIMARY HYPERTENSION: ICD-10-CM

## 2025-02-07 DIAGNOSIS — J06.9 URI WITH COUGH AND CONGESTION: Primary | ICD-10-CM

## 2025-02-07 DIAGNOSIS — R68.89 FLU-LIKE SYMPTOMS: ICD-10-CM

## 2025-02-07 DIAGNOSIS — G30.1 MODERATE LATE ONSET ALZHEIMER'S DEMENTIA WITH ANXIETY (HCC): ICD-10-CM

## 2025-02-07 LAB
INFLUENZA A ANTIGEN, POC: NEGATIVE
INFLUENZA B ANTIGEN, POC: NEGATIVE
VALID INTERNAL CONTROL, POC: YES

## 2025-02-07 PROCEDURE — 99214 OFFICE O/P EST MOD 30 MIN: CPT | Performed by: FAMILY MEDICINE

## 2025-02-07 PROCEDURE — 87804 INFLUENZA ASSAY W/OPTIC: CPT | Performed by: FAMILY MEDICINE

## 2025-02-07 RX ORDER — DOXYCYCLINE HYCLATE 100 MG
100 TABLET ORAL 2 TIMES DAILY
Qty: 20 TABLET | Refills: 0 | Status: SHIPPED | OUTPATIENT
Start: 2025-02-07 | End: 2025-02-07

## 2025-02-07 RX ORDER — DOXYCYCLINE HYCLATE 100 MG
100 TABLET ORAL 2 TIMES DAILY
Qty: 20 TABLET | Refills: 0 | Status: SHIPPED | OUTPATIENT
Start: 2025-02-07 | End: 2025-02-17

## 2025-02-07 SDOH — ECONOMIC STABILITY: FOOD INSECURITY: WITHIN THE PAST 12 MONTHS, THE FOOD YOU BOUGHT JUST DIDN'T LAST AND YOU DIDN'T HAVE MONEY TO GET MORE.: NEVER TRUE

## 2025-02-07 SDOH — ECONOMIC STABILITY: FOOD INSECURITY: WITHIN THE PAST 12 MONTHS, YOU WORRIED THAT YOUR FOOD WOULD RUN OUT BEFORE YOU GOT MONEY TO BUY MORE.: NEVER TRUE

## 2025-02-07 ASSESSMENT — PATIENT HEALTH QUESTIONNAIRE - PHQ9
SUM OF ALL RESPONSES TO PHQ QUESTIONS 1-9: 0
1. LITTLE INTEREST OR PLEASURE IN DOING THINGS: NOT AT ALL
SUM OF ALL RESPONSES TO PHQ9 QUESTIONS 1 & 2: 0
2. FEELING DOWN, DEPRESSED OR HOPELESS: NOT AT ALL

## 2025-02-07 NOTE — PROGRESS NOTES
Health Maintenance Due   Topic Date Due    DTaP/Tdap/Td vaccine (1 - Tdap) Never done    Respiratory Syncytial Virus (RSV) Pregnant or age 60 yrs+ (1 - 1-dose 75+ series) Never done    COVID-19 Vaccine (5 - 2024-25 season) 09/01/2024

## 2025-02-07 NOTE — PROGRESS NOTES
HPI  Lia Lopez is a 79 y.o. female who presents with congestion, cough. Started 5 days ago days ago.  Okay sleep, drinking fluids. Other symptoms include fatigue and stuck in bed for 2 days.  Low-grade temperature of 99.  Denies wheezing.    PMHx:  Past Medical History:   Diagnosis Date    Anxiety     Arrhythmia     Arthritis     Breast cancer (HCC)     right side    GERD (gastroesophageal reflux disease)     High cholesterol     HTN (hypertension)     Hypothyroid     Memory deficit     Menopause     Radiation therapy complication     Shingles outbreak        Meds:   Current Outpatient Medications   Medication Sig Dispense Refill    doxycycline hyclate (VIBRA-TABS) 100 MG tablet Take 1 tablet by mouth 2 times daily for 10 days 20 tablet 0    pantoprazole (PROTONIX) 40 MG tablet TAKE 1 TABLET DAILY 90 tablet 3    alendronate (FOSAMAX) 70 MG tablet TAKE 1 TABLET EVERY 7 DAYS 12 tablet 3    atenolol (TENORMIN) 25 MG tablet TAKE 2 TABLETS DAILY 180 tablet 3    letrozole (FEMARA) 2.5 MG tablet TAKE 1 TABLET DAILY 90 tablet 3    escitalopram (LEXAPRO) 5 MG tablet TAKE 1 TABLET DAILY 90 tablet 3    memantine (NAMENDA) 10 MG tablet Take 1 tablet by mouth 2 times daily 180 tablet 3    B Complex-C-Folic Acid (NEPHROCAPS PO) Take 1 tablet by mouth daily      levothyroxine (SYNTHROID) 100 MCG tablet TAKE 1 TABLET DAILY BEFORE BREAKFAST 90 tablet 3    Multiple Vitamins-Minerals (WOMENS MULTI PO) Take by mouth      NONFORMULARY Cognium 1 tablet BID      calcium carbonate (OSCAL) 500 MG TABS tablet Take 1 tablet by mouth 2 times daily      Glucosamine-Chondroit-Vit C-Mn (GLUCOSAMINE CHONDR 1500 COMPLX PO) Take by mouth      Multiple Vitamins-Minerals (PRESERVISION AREDS 2 PO) Take by mouth 2 times daily      fexofenadine (HM FEXOFENADINE HCL) 180 MG tablet Take 1 tablet by mouth daily      atorvastatin (LIPITOR) 40 MG tablet TAKE 1 TABLET NIGHTLY FOR CHOLESTEROL 90 tablet 3    ascorbic acid (VITAMIN C) 500 MG tablet Take by

## 2025-02-12 RX ORDER — LEVOTHYROXINE SODIUM 100 UG/1
TABLET ORAL
Qty: 90 TABLET | Refills: 3 | Status: SHIPPED | OUTPATIENT
Start: 2025-02-12

## 2025-03-10 ENCOUNTER — TELEPHONE (OUTPATIENT)
Age: 80
End: 2025-03-10

## 2025-03-10 DIAGNOSIS — R60.0 LEG EDEMA: Primary | ICD-10-CM

## 2025-03-10 NOTE — TELEPHONE ENCOUNTER
Pts  called and is requesting a referral to [facility] for the swelling in her legs to be faxed to [phone].

## 2025-04-16 ENCOUNTER — TELEPHONE (OUTPATIENT)
Age: 80
End: 2025-04-16

## 2025-04-16 NOTE — TELEPHONE ENCOUNTER
Patient's , Alvarado (on PHI), called stating that patient's Alzheimer's is progressing pretty rapidly and he would like to discuss this with Kris Rivera. Please advise. [phone]

## 2025-04-16 NOTE — TELEPHONE ENCOUNTER
Spoke with patients , Alvarado, on PHI. Advised that patient did have an appt with Cat on 4/22/25 however patient cancel. Alvarado stated that was because patient is totally booked with appts that day. Offered an appt tomorrow at 8:30 or 10 AM. Alvarado stated that patient has appt tomorrow at 10 am for a hearing test. Advised the next available would not be until 9/12/25. Asked if the hearing test could be rescheduled. Alvarado does feel patient seeing Cat is more important. Patient scheduled with Cat tomorrow at 10 am.

## 2025-04-17 ENCOUNTER — OFFICE VISIT (OUTPATIENT)
Age: 80
End: 2025-04-17
Payer: MEDICARE

## 2025-04-17 VITALS
HEART RATE: 76 BPM | SYSTOLIC BLOOD PRESSURE: 132 MMHG | DIASTOLIC BLOOD PRESSURE: 76 MMHG | OXYGEN SATURATION: 97 % | HEIGHT: 61 IN | BODY MASS INDEX: 24.92 KG/M2 | RESPIRATION RATE: 18 BRPM | WEIGHT: 132 LBS

## 2025-04-17 DIAGNOSIS — G30.1 MODERATE LATE ONSET ALZHEIMER'S DEMENTIA WITH ANXIETY (HCC): Primary | ICD-10-CM

## 2025-04-17 DIAGNOSIS — F02.B4 MODERATE LATE ONSET ALZHEIMER'S DEMENTIA WITH ANXIETY (HCC): Primary | ICD-10-CM

## 2025-04-17 DIAGNOSIS — F41.9 ANXIETY DISORDER, UNSPECIFIED TYPE: ICD-10-CM

## 2025-04-17 PROCEDURE — 1090F PRES/ABSN URINE INCON ASSESS: CPT | Performed by: NURSE PRACTITIONER

## 2025-04-17 PROCEDURE — 1036F TOBACCO NON-USER: CPT | Performed by: NURSE PRACTITIONER

## 2025-04-17 PROCEDURE — G8427 DOCREV CUR MEDS BY ELIG CLIN: HCPCS | Performed by: NURSE PRACTITIONER

## 2025-04-17 PROCEDURE — 3078F DIAST BP <80 MM HG: CPT | Performed by: NURSE PRACTITIONER

## 2025-04-17 PROCEDURE — 1160F RVW MEDS BY RX/DR IN RCRD: CPT | Performed by: NURSE PRACTITIONER

## 2025-04-17 PROCEDURE — 1159F MED LIST DOCD IN RCRD: CPT | Performed by: NURSE PRACTITIONER

## 2025-04-17 PROCEDURE — G8399 PT W/DXA RESULTS DOCUMENT: HCPCS | Performed by: NURSE PRACTITIONER

## 2025-04-17 PROCEDURE — 99214 OFFICE O/P EST MOD 30 MIN: CPT | Performed by: NURSE PRACTITIONER

## 2025-04-17 PROCEDURE — 3075F SYST BP GE 130 - 139MM HG: CPT | Performed by: NURSE PRACTITIONER

## 2025-04-17 PROCEDURE — 1123F ACP DISCUSS/DSCN MKR DOCD: CPT | Performed by: NURSE PRACTITIONER

## 2025-04-17 PROCEDURE — G8420 CALC BMI NORM PARAMETERS: HCPCS | Performed by: NURSE PRACTITIONER

## 2025-04-17 RX ORDER — MEMANTINE HYDROCHLORIDE 10 MG/1
10 TABLET ORAL 2 TIMES DAILY
Qty: 180 TABLET | Refills: 3 | Status: SHIPPED | OUTPATIENT
Start: 2025-04-17

## 2025-04-17 NOTE — PROGRESS NOTES
Chief Complaint   Patient presents with    Dementia     Follow up -patients feels she is doing well -  feels she is progressing with the dementia      1. Have you been to the ER, urgent care clinic since your last visit?  Hospitalized since your last visit? No     2. Have you seen or consulted any other health care providers outside of the [facility] since your last visit?  Include any pap smears or colon screening.  Yes Anderson Samuel chiropractor

## 2025-04-17 NOTE — PROGRESS NOTES
[facility]  [address]  [address]  [place] [address]  Tel: [phone]  Fax: [phone]      Date:  25     Name:  DERECK ZUÑIGA  :  1945  MRN:  [id number]     PCP:  Hira Montano MD    Chief Complaint   Patient presents with    Dementia     Follow up -patients feels she is doing well -  feels she is progressing with the dementia        HISTORY OF PRESENT ILLNESS:  History of Present Illness  The patient is a 79-year-old female here today for a regular follow-up for dementia. She attends the visits with her . The patient was last seen on 10/2024.    Her  reports a perceived decline in her memory function, necessitating a one-task-at-a-time approach to daily activities. Difficulty with multitasking is exhibited, and items such as trash cans and towels are often misplaced. Despite these challenges, she maintains a calm demeanor and does not engage in unsafe behaviors such as leaving the house unattended or driving. Occasional frustration and agitation are experienced, but no significant emotional disturbances are reported. Personal hygiene tasks are performed independently, although assistance with clothing selection is required at times.  Namenda is currently prescribed for dementia management.  helps with meds.  Unfortunately patient was intolerant to the donepezil due to diarrhea.    No sleep disturbances, nightmares, or physical pain are reported. No falls or headaches have been experienced.  She is currently on a low dose of Lexapro, prescribed by her primary care physician, for anxiety management. Which appears to be beneficial.     SOCIAL HISTORY  Marital Status:   Occupations: Former , worked in the 's office  Hobbies: Walking with a friend  Sleep: Sleeps well, no bad dreams or nightmares  Screen Time: Watches TV, gets mad at the TV sometimes         Recap from last visit :      1.

## 2025-04-24 RX ORDER — ATORVASTATIN CALCIUM 40 MG/1
TABLET, FILM COATED ORAL
Qty: 90 TABLET | Refills: 3 | Status: SHIPPED | OUTPATIENT
Start: 2025-04-24

## 2025-05-06 RX ORDER — ESCITALOPRAM OXALATE 5 MG/1
5 TABLET ORAL DAILY
Qty: 90 TABLET | Refills: 3 | Status: SHIPPED | OUTPATIENT
Start: 2025-05-06

## 2025-06-24 ENCOUNTER — TELEPHONE (OUTPATIENT)
Age: 80
End: 2025-06-24

## 2025-06-24 NOTE — TELEPHONE ENCOUNTER
VM rec'd from the patient's spouse this morning, Alvarado. He requested a call back to discuss long term care in regard to the patient's breast cancer and surgery. Patient, spouse, and their families are having discussions about potential next living arragnements and care options for the couple.     Pt's identifiers verified.     Alvarado wanted to confirm when the patient's surgery was, and when to follow up with the office. BJORN explained that since the patient saw NP in January, she is due for yearly follow ups in clinic. Alvarado voiced his understanding as was very appreciative. Provided the number for the  so that Alvarado and Lia could schedule her next follow up visit when they are ready.     No further questions at this time. Alvarado was very appreciative.      GONZALEZ Austin  [facility]     [facility], [facility], & [facility]  W: [phone]  [email or web address]   Good Help to Those in Need®

## 2025-06-24 NOTE — TELEPHONE ENCOUNTER
Spoke with patients , on PHI  Alvarado requested appt with you. Scheduled for tomorrow at 1030 as you had an available slot. He did say it may need to be a phone visit due to the heat though.

## 2025-06-24 NOTE — TELEPHONE ENCOUNTER
Patient's , Alvarado (on PHI), called stating that he would like to discuss the tentative long term care plan the family is looking to set up for patient (and him) and they are both handicapped. Alvarado wants to ensure that this plan would work well for patient and that they are moving in the right direction. Please advise. [phone]

## 2025-06-25 ENCOUNTER — SCHEDULED TELEPHONE ENCOUNTER (OUTPATIENT)
Age: 80
End: 2025-06-25
Payer: MEDICARE

## 2025-06-25 DIAGNOSIS — G30.1 MODERATE LATE ONSET ALZHEIMER'S DEMENTIA WITH ANXIETY (HCC): Primary | ICD-10-CM

## 2025-06-25 DIAGNOSIS — F02.B4 MODERATE LATE ONSET ALZHEIMER'S DEMENTIA WITH ANXIETY (HCC): Primary | ICD-10-CM

## 2025-06-25 PROCEDURE — 99213 OFFICE O/P EST LOW 20 MIN: CPT | Performed by: NURSE PRACTITIONER

## 2025-06-25 RX ORDER — CETIRIZINE HYDROCHLORIDE 10 MG/1
10 TABLET ORAL DAILY
COMMUNITY

## 2025-06-25 RX ORDER — B1/B2/B3/B5/B6/IRON/METH/CHOLN 2.5-18/15
LIQUID (ML) ORAL
COMMUNITY

## 2025-06-25 ASSESSMENT — PATIENT HEALTH QUESTIONNAIRE - PHQ9
2. FEELING DOWN, DEPRESSED OR HOPELESS: SEVERAL DAYS
SUM OF ALL RESPONSES TO PHQ QUESTIONS 1-9: 2
1. LITTLE INTEREST OR PLEASURE IN DOING THINGS: SEVERAL DAYS

## 2025-06-25 NOTE — PROGRESS NOTES
Chief Complaint   Patient presents with    Other     Would like to discuss long term care for the family - may mean to stay where they are or move to [place] with daughter or older son     1. Have you been to the ER, urgent care clinic since your last visit?  Hospitalized since your last visit? No     2. Have you seen or consulted any other health care providers outside of the [facility] since your last visit?  Include any pap smears or colon screening.  Yes   Dr Cabrera - Eye  Dentist   Chiropractor Dr Anderson Samuel   [facility]     
as determined by the patient's insurance carrier. The patient is aware that they may receive a bill and has provided verbal consent to proceed:     Subjective:       History of Present Illness:   Lia Lopez is a 80 y.o. female here today for a virtual visit due to progressive dementia, she was last seen in the office with her  in April 2025.  At that time he had noted a perceived decline in memory function task completion daily activities patient did note occasional frustration and agitation but no significant emotional distress has been assessed with medication she was continued on Namenda intolerant of the Aricept.  She has had several falls, but overall she is doing well.  Memory loss persist, there is been no major issues with anxiety or agitation.  No unsafe behaviors noted..  Lia's daughter, Jen is the POA.  Trying to figure out a LTC plan, no set timeline at this time. Possibly moving to [place] to where her children are.     Recap from last visit.  1. Dementia.  Her cognitive function remains stable, with no significant changes observed since the last visit in October 2024. She continues to require reminders for certain tasks but is otherwise maintaining her daily activities. The current medication regimen includes Namenda, which will be continued. The potential side effects of Namenda have been discussed with her . If there are any drastic changes in her condition, they are advised to reach out to the clinic.  Her  does note their daughter-in-law plans to assist with caregiving in the afternoons, she will be living in a travel trailer on their property, he is looking forward to the additional assistance as he is beginning to have physical limitations himself. I discussed with the patient and family members in regards to the patient's cognitive limitations and need to ensure patient safety.  Attempts to minimize opportunities of harm is important.   Activities to be monitored, or

## 2025-07-11 DIAGNOSIS — Z17.0 MALIGNANT NEOPLASM OF UPPER-OUTER QUADRANT OF RIGHT BREAST IN FEMALE, ESTROGEN RECEPTOR POSITIVE (HCC): ICD-10-CM

## 2025-07-11 DIAGNOSIS — C50.411 MALIGNANT NEOPLASM OF UPPER-OUTER QUADRANT OF RIGHT BREAST IN FEMALE, ESTROGEN RECEPTOR POSITIVE (HCC): ICD-10-CM

## 2025-07-11 RX ORDER — LETROZOLE 2.5 MG/1
2.5 TABLET, FILM COATED ORAL DAILY
Qty: 30 TABLET | Refills: 0 | Status: SHIPPED | OUTPATIENT
Start: 2025-07-11

## (undated) DEVICE — KENDALL RADIOLUCENT FOAM MONITORING ELECTRODE RECTANGULAR SHAPE: Brand: KENDALL

## (undated) DEVICE — 1200 GUARD II KIT W/5MM TUBE W/O VAC TUBE: Brand: GUARDIAN

## (undated) DEVICE — CATH IV AUTOGRD BC BLU 22GA 25 -- INSYTE

## (undated) DEVICE — Device

## (undated) DEVICE — SYR 10ML LUER LOK 1/5ML GRAD --

## (undated) DEVICE — TOWEL 4 PLY TISS 19X30 SUE WHT

## (undated) DEVICE — CURVED, SMALL JAW, OPEN SEALER/DIVIDER: Brand: LIGASURE

## (undated) DEVICE — SOLIDIFIER MEDC 1200ML -- CONVERT TO 356117

## (undated) DEVICE — 3M™ IOBAN™ 2 ANTIMICROBIAL INCISE DRAPE 6640EZ: Brand: IOBAN™ 2

## (undated) DEVICE — CHEST/BREAST-MRMCASU: Brand: MEDLINE INDUSTRIES, INC.

## (undated) DEVICE — PROVE COVER: Brand: UNBRANDED

## (undated) DEVICE — SUTURE VCRL SZ 3-0 L27IN ABSRB UD L26MM SH 1/2 CIR J416H

## (undated) DEVICE — COVER US PRB W4XL15IN GAM CRDLSS FLD

## (undated) DEVICE — SPONGE GZ W4XL4IN COT 12 PLY TYP VII WVN C FLD DSGN

## (undated) DEVICE — INSULATED BLADE ELECTRODE: Brand: EDGE

## (undated) DEVICE — CONTAINER,SPEC,PNEUM TUBE,3OZ,STRL PATH: Brand: MEDLINE

## (undated) DEVICE — NEONATAL-ADULT SPO2 SENSOR: Brand: NELLCOR

## (undated) DEVICE — KENDALL DL ECG CABLE AND LEAD WIRE SYSTEM, 3-LEAD, SINGLE PATIENT USE: Brand: KENDALL

## (undated) DEVICE — SYR 3ML LL TIP 1/10ML GRAD --

## (undated) DEVICE — PROBE DETECTION F/LUMPECTOMY -- ORDER IN MULTIPLES OF 5 EA ..MARGINPROBE

## (undated) DEVICE — NEEDLE HYPO 18GA L1.5IN PNK S STL HUB POLYPR SHLD REG BVL

## (undated) DEVICE — BASIN EMSIS 16OZ GRAPHITE PLAS KID SHP MOLD GRAD FOR ORAL

## (undated) DEVICE — SUPPORT MAMM SURG 48-50 IN 2XL BRA

## (undated) DEVICE — CATH IV AUTOGRD BC PNK 20GA 25 -- INSYTE

## (undated) DEVICE — SET ADMIN 16ML TBNG L100IN 2 Y INJ SITE IV PIGGY BK DISP

## (undated) DEVICE — ICE PK EYE 4 1/2INX10IN (15/BX 2BX/CS

## (undated) DEVICE — SUT SLK 2-0SH 30IN BLK --

## (undated) DEVICE — Z DISCONTINUED PER MEDLINE LINE GAS SAMPLING O2/CO2 LNG AD 13 FT NSL W/ TBNG FILTERLINE

## (undated) DEVICE — SOLUTION IRRIG 1000ML 0.9% SOD CHL USP POUR PLAS BTL

## (undated) DEVICE — SMOKE EVACUATION PENCIL: Brand: VALLEYLAB

## (undated) DEVICE — SUTURE MCRYL SZ 4-0 L27IN ABSRB UD L19MM PS-2 1/2 CIR PRIM Y426H

## (undated) DEVICE — SUTURE VCRL SZ 2-0 L27IN ABSRB UD L26MM SH 1/2 CIR J417H

## (undated) DEVICE — GLOVE SURG SZ 65 L12IN FNGR THK79MIL GRN LTX FREE

## (undated) DEVICE — CONTINU-FLO SOLUTION SET, 2 INJECTION SITES, MALE LUER LOCK ADAPTER WITH RETRACTABLE COLLAR, LARGE BORE STOPCOCK WITH ROTATING MALE LUER LOCK EXTENSION SET, 2 INJECTION SITES, MALE LUER LOCK ADAPTER WITH RETRACTABLE COLLAR: Brand: INTERLINK/CONTINU-FLO

## (undated) DEVICE — 1010 S-DRAPE TOWEL DRAPE 10/BX: Brand: STERI-DRAPE™

## (undated) DEVICE — SYRINGE 50ML E/T